# Patient Record
Sex: FEMALE | Race: WHITE | NOT HISPANIC OR LATINO | Employment: OTHER | URBAN - METROPOLITAN AREA
[De-identification: names, ages, dates, MRNs, and addresses within clinical notes are randomized per-mention and may not be internally consistent; named-entity substitution may affect disease eponyms.]

---

## 2017-01-12 ENCOUNTER — HOSPITAL ENCOUNTER (OUTPATIENT)
Dept: INFUSION THERAPY | Facility: HOSPITAL | Age: 67
Discharge: HOME | End: 2017-01-12
Admitting: ALLERGY & IMMUNOLOGY

## 2017-01-12 VITALS
TEMPERATURE: 98.1 F | SYSTOLIC BLOOD PRESSURE: 112 MMHG | OXYGEN SATURATION: 96 % | RESPIRATION RATE: 20 BRPM | HEART RATE: 69 BPM | DIASTOLIC BLOOD PRESSURE: 58 MMHG

## 2017-01-12 DIAGNOSIS — J45.40 MODERATE PERSISTENT ASTHMA WITHOUT COMPLICATION: ICD-10-CM

## 2017-01-12 PROCEDURE — 25010000002 OMALIZUMAB PER 5 MG: Performed by: ALLERGY & IMMUNOLOGY

## 2017-01-12 PROCEDURE — 96401 CHEMO ANTI-NEOPL SQ/IM: CPT

## 2017-01-12 RX ADMIN — OMALIZUMAB 150 MG: 202.5 INJECTION, SOLUTION SUBCUTANEOUS at 10:02

## 2017-01-12 NOTE — PROGRESS NOTES
Patient refused AVS. Patient tolerated injection with no S/S of reaction or complaints voiced. Injection site x 1 with band-aid applied at injection time and checked prior to discharge. Patient ambulatory, D/C'd from ACU at 1037.

## 2017-02-09 ENCOUNTER — HOSPITAL ENCOUNTER (OUTPATIENT)
Dept: INFUSION THERAPY | Facility: HOSPITAL | Age: 67
Discharge: HOME | End: 2017-02-09
Admitting: ALLERGY & IMMUNOLOGY

## 2017-02-09 VITALS
DIASTOLIC BLOOD PRESSURE: 73 MMHG | SYSTOLIC BLOOD PRESSURE: 119 MMHG | RESPIRATION RATE: 20 BRPM | HEART RATE: 75 BPM | OXYGEN SATURATION: 95 % | TEMPERATURE: 97.2 F

## 2017-02-09 DIAGNOSIS — J45.40 MODERATE PERSISTENT ASTHMA WITHOUT COMPLICATION: ICD-10-CM

## 2017-02-09 PROCEDURE — 25010000002 OMALIZUMAB PER 5 MG: Performed by: ALLERGY & IMMUNOLOGY

## 2017-02-09 PROCEDURE — 96401 CHEMO ANTI-NEOPL SQ/IM: CPT

## 2017-02-09 RX ADMIN — OMALIZUMAB 150 MG: 202.5 INJECTION, SOLUTION SUBCUTANEOUS at 10:29

## 2017-03-09 ENCOUNTER — APPOINTMENT (OUTPATIENT)
Dept: INFUSION THERAPY | Facility: HOSPITAL | Age: 67
End: 2017-03-09

## 2017-03-16 ENCOUNTER — HOSPITAL ENCOUNTER (OUTPATIENT)
Dept: INFUSION THERAPY | Facility: HOSPITAL | Age: 67
Discharge: HOME | End: 2017-03-16
Admitting: ALLERGY & IMMUNOLOGY

## 2017-03-16 VITALS
HEART RATE: 76 BPM | TEMPERATURE: 97.3 F | DIASTOLIC BLOOD PRESSURE: 86 MMHG | OXYGEN SATURATION: 95 % | SYSTOLIC BLOOD PRESSURE: 120 MMHG | RESPIRATION RATE: 20 BRPM

## 2017-03-16 DIAGNOSIS — J45.40 MODERATE PERSISTENT ASTHMA WITHOUT COMPLICATION: ICD-10-CM

## 2017-03-16 PROCEDURE — 96401 CHEMO ANTI-NEOPL SQ/IM: CPT

## 2017-03-16 PROCEDURE — 25010000002 OMALIZUMAB PER 5 MG: Performed by: ALLERGY & IMMUNOLOGY

## 2017-03-16 RX ORDER — GABAPENTIN 300 MG/1
CAPSULE ORAL
COMMUNITY
Start: 2017-02-16 | End: 2018-03-01 | Stop reason: ALTCHOICE

## 2017-03-16 RX ADMIN — OMALIZUMAB 150 MG: 202.5 INJECTION, SOLUTION SUBCUTANEOUS at 10:20

## 2017-04-13 ENCOUNTER — HOSPITAL ENCOUNTER (OUTPATIENT)
Dept: INFUSION THERAPY | Facility: HOSPITAL | Age: 67
Discharge: HOME | End: 2017-04-13
Admitting: ALLERGY & IMMUNOLOGY

## 2017-04-13 VITALS
DIASTOLIC BLOOD PRESSURE: 74 MMHG | TEMPERATURE: 97.4 F | SYSTOLIC BLOOD PRESSURE: 119 MMHG | RESPIRATION RATE: 16 BRPM | OXYGEN SATURATION: 94 % | HEART RATE: 69 BPM

## 2017-04-13 DIAGNOSIS — J45.40 MODERATE PERSISTENT ASTHMA WITHOUT COMPLICATION: ICD-10-CM

## 2017-04-13 PROCEDURE — 96401 CHEMO ANTI-NEOPL SQ/IM: CPT

## 2017-04-13 PROCEDURE — 25010000002 OMALIZUMAB PER 5 MG: Performed by: ALLERGY & IMMUNOLOGY

## 2017-04-13 RX ADMIN — OMALIZUMAB 150 MG: 202.5 INJECTION, SOLUTION SUBCUTANEOUS at 10:04

## 2017-04-13 NOTE — PROGRESS NOTES
Tolerated well. Monitored 30 minutes post injection without incident. VSS. D/C'd per ambulation @1040 with sister.

## 2017-05-11 ENCOUNTER — HOSPITAL ENCOUNTER (OUTPATIENT)
Dept: INFUSION THERAPY | Facility: HOSPITAL | Age: 67
Discharge: HOME | End: 2017-05-11
Admitting: FAMILY MEDICINE

## 2017-05-11 VITALS
SYSTOLIC BLOOD PRESSURE: 113 MMHG | HEART RATE: 80 BPM | TEMPERATURE: 97.4 F | DIASTOLIC BLOOD PRESSURE: 73 MMHG | RESPIRATION RATE: 20 BRPM | OXYGEN SATURATION: 96 %

## 2017-05-11 DIAGNOSIS — J45.40 MODERATE PERSISTENT ASTHMA WITHOUT COMPLICATION: ICD-10-CM

## 2017-05-11 PROCEDURE — 96401 CHEMO ANTI-NEOPL SQ/IM: CPT

## 2017-05-11 PROCEDURE — 25010000002 OMALIZUMAB PER 5 MG: Performed by: ALLERGY & IMMUNOLOGY

## 2017-05-11 RX ADMIN — OMALIZUMAB 150 MG: 202.5 INJECTION, SOLUTION SUBCUTANEOUS at 10:25

## 2017-06-08 ENCOUNTER — HOSPITAL ENCOUNTER (OUTPATIENT)
Dept: INFUSION THERAPY | Facility: HOSPITAL | Age: 67
Discharge: HOME | End: 2017-06-08
Admitting: ALLERGY & IMMUNOLOGY

## 2017-06-08 VITALS
OXYGEN SATURATION: 96 % | HEART RATE: 72 BPM | DIASTOLIC BLOOD PRESSURE: 79 MMHG | SYSTOLIC BLOOD PRESSURE: 129 MMHG | RESPIRATION RATE: 20 BRPM | TEMPERATURE: 97.6 F

## 2017-06-08 DIAGNOSIS — J45.40 MODERATE PERSISTENT ASTHMA WITHOUT COMPLICATION: ICD-10-CM

## 2017-06-08 PROCEDURE — 96401 CHEMO ANTI-NEOPL SQ/IM: CPT

## 2017-06-08 PROCEDURE — 25010000002 OMALIZUMAB PER 5 MG: Performed by: ALLERGY & IMMUNOLOGY

## 2017-06-08 RX ADMIN — OMALIZUMAB 150 MG: 202.5 INJECTION, SOLUTION SUBCUTANEOUS at 10:15

## 2017-07-06 ENCOUNTER — HOSPITAL ENCOUNTER (OUTPATIENT)
Dept: INFUSION THERAPY | Facility: HOSPITAL | Age: 67
Discharge: HOME | End: 2017-07-06
Admitting: ALLERGY & IMMUNOLOGY

## 2017-07-06 ENCOUNTER — TRANSCRIBE ORDERS (OUTPATIENT)
Dept: ADMINISTRATIVE | Facility: HOSPITAL | Age: 67
End: 2017-07-06

## 2017-07-06 VITALS
SYSTOLIC BLOOD PRESSURE: 128 MMHG | TEMPERATURE: 95.5 F | HEART RATE: 64 BPM | DIASTOLIC BLOOD PRESSURE: 73 MMHG | OXYGEN SATURATION: 94 % | RESPIRATION RATE: 16 BRPM

## 2017-07-06 DIAGNOSIS — R06.09 DYSPNEA ON EXERTION: Primary | ICD-10-CM

## 2017-07-06 DIAGNOSIS — J98.6 ELEVATED DIAPHRAGM: ICD-10-CM

## 2017-07-06 DIAGNOSIS — J45.40 MODERATE PERSISTENT ASTHMA WITHOUT COMPLICATION: ICD-10-CM

## 2017-07-06 DIAGNOSIS — J45.909 UNCOMPLICATED ASTHMA, UNSPECIFIED ASTHMA SEVERITY: ICD-10-CM

## 2017-07-06 PROCEDURE — 96401 CHEMO ANTI-NEOPL SQ/IM: CPT

## 2017-07-06 PROCEDURE — 25010000002 OMALIZUMAB PER 5 MG: Performed by: ALLERGY & IMMUNOLOGY

## 2017-07-06 RX ADMIN — OMALIZUMAB 150 MG: 202.5 INJECTION, SOLUTION SUBCUTANEOUS at 10:13

## 2017-07-06 NOTE — PROGRESS NOTES
Right arm injection site checked, no s/s of reaction. Patient discharged at 1048 per ambulation.

## 2017-07-27 ENCOUNTER — HOSPITAL ENCOUNTER (OUTPATIENT)
Dept: RESPIRATORY THERAPY | Facility: HOSPITAL | Age: 67
Discharge: HOME | End: 2017-07-27
Attending: INTERNAL MEDICINE | Admitting: INTERNAL MEDICINE

## 2017-07-27 ENCOUNTER — HOSPITAL ENCOUNTER (OUTPATIENT)
Dept: GENERAL RADIOLOGY | Facility: HOSPITAL | Age: 67
Discharge: HOME | End: 2017-07-27
Attending: INTERNAL MEDICINE

## 2017-07-27 DIAGNOSIS — J98.6 ELEVATED DIAPHRAGM: ICD-10-CM

## 2017-07-27 DIAGNOSIS — R06.09 DYSPNEA ON EXERTION: ICD-10-CM

## 2017-07-27 DIAGNOSIS — J45.909 UNCOMPLICATED ASTHMA, UNSPECIFIED ASTHMA SEVERITY: ICD-10-CM

## 2017-07-27 PROCEDURE — 94640 AIRWAY INHALATION TREATMENT: CPT

## 2017-07-27 PROCEDURE — A9270 NON-COVERED ITEM OR SERVICE: HCPCS | Performed by: INTERNAL MEDICINE

## 2017-07-27 PROCEDURE — 94729 DIFFUSING CAPACITY: CPT

## 2017-07-27 PROCEDURE — 94060 EVALUATION OF WHEEZING: CPT

## 2017-07-27 PROCEDURE — 94726 PLETHYSMOGRAPHY LUNG VOLUMES: CPT

## 2017-07-27 PROCEDURE — 63710000001 ALBUTEROL PER 1 MG: Performed by: INTERNAL MEDICINE

## 2017-07-27 PROCEDURE — 76000 FLUOROSCOPY <1 HR PHYS/QHP: CPT

## 2017-07-27 RX ORDER — ALBUTEROL SULFATE 2.5 MG/3ML
2.5 SOLUTION RESPIRATORY (INHALATION) ONCE
Status: COMPLETED | OUTPATIENT
Start: 2017-07-27 | End: 2017-07-27

## 2017-07-27 RX ADMIN — ALBUTEROL SULFATE 2.5 MG: 2.5 SOLUTION RESPIRATORY (INHALATION) at 10:02

## 2017-08-03 ENCOUNTER — HOSPITAL ENCOUNTER (OUTPATIENT)
Dept: INFUSION THERAPY | Facility: HOSPITAL | Age: 67
Discharge: HOME | End: 2017-08-03
Admitting: ALLERGY & IMMUNOLOGY

## 2017-08-03 VITALS
DIASTOLIC BLOOD PRESSURE: 64 MMHG | HEART RATE: 68 BPM | SYSTOLIC BLOOD PRESSURE: 108 MMHG | RESPIRATION RATE: 20 BRPM | TEMPERATURE: 94.6 F | OXYGEN SATURATION: 97 %

## 2017-08-03 DIAGNOSIS — J45.40 MODERATE PERSISTENT ASTHMA WITHOUT COMPLICATION: ICD-10-CM

## 2017-08-03 PROCEDURE — 25010000002 OMALIZUMAB PER 5 MG: Performed by: ALLERGY & IMMUNOLOGY

## 2017-08-03 PROCEDURE — 96372 THER/PROPH/DIAG INJ SC/IM: CPT

## 2017-08-03 RX ADMIN — OMALIZUMAB 150 MG: 202.5 INJECTION, SOLUTION SUBCUTANEOUS at 10:20

## 2017-08-31 ENCOUNTER — HOSPITAL ENCOUNTER (OUTPATIENT)
Dept: INFUSION THERAPY | Facility: HOSPITAL | Age: 67
Discharge: HOME | End: 2017-08-31
Admitting: ALLERGY & IMMUNOLOGY

## 2017-08-31 VITALS
SYSTOLIC BLOOD PRESSURE: 122 MMHG | HEART RATE: 68 BPM | DIASTOLIC BLOOD PRESSURE: 71 MMHG | RESPIRATION RATE: 20 BRPM | OXYGEN SATURATION: 96 % | TEMPERATURE: 97.6 F

## 2017-08-31 DIAGNOSIS — J45.40 MODERATE PERSISTENT ASTHMA WITHOUT COMPLICATION: ICD-10-CM

## 2017-08-31 PROCEDURE — 25010000002 OMALIZUMAB PER 5 MG: Performed by: ALLERGY & IMMUNOLOGY

## 2017-08-31 PROCEDURE — 96372 THER/PROPH/DIAG INJ SC/IM: CPT

## 2017-08-31 RX ADMIN — OMALIZUMAB 150 MG: 202.5 INJECTION, SOLUTION SUBCUTANEOUS at 10:08

## 2017-09-28 ENCOUNTER — HOSPITAL ENCOUNTER (OUTPATIENT)
Dept: INFUSION THERAPY | Facility: HOSPITAL | Age: 67
Discharge: HOME | End: 2017-09-28
Admitting: ALLERGY & IMMUNOLOGY

## 2017-09-28 VITALS
SYSTOLIC BLOOD PRESSURE: 114 MMHG | HEART RATE: 69 BPM | TEMPERATURE: 98.1 F | DIASTOLIC BLOOD PRESSURE: 60 MMHG | RESPIRATION RATE: 16 BRPM | OXYGEN SATURATION: 95 %

## 2017-09-28 DIAGNOSIS — J45.40 MODERATE PERSISTENT ASTHMA WITHOUT COMPLICATION: ICD-10-CM

## 2017-09-28 PROCEDURE — 96372 THER/PROPH/DIAG INJ SC/IM: CPT

## 2017-09-28 PROCEDURE — 25010000002 OMALIZUMAB PER 5 MG: Performed by: ALLERGY & IMMUNOLOGY

## 2017-09-28 RX ADMIN — OMALIZUMAB 150 MG: 202.5 INJECTION, SOLUTION SUBCUTANEOUS at 10:12

## 2017-10-26 ENCOUNTER — HOSPITAL ENCOUNTER (OUTPATIENT)
Dept: INFUSION THERAPY | Facility: HOSPITAL | Age: 67
Discharge: HOME | End: 2017-10-26
Admitting: ALLERGY & IMMUNOLOGY

## 2017-10-26 VITALS
SYSTOLIC BLOOD PRESSURE: 113 MMHG | HEART RATE: 73 BPM | TEMPERATURE: 96.4 F | OXYGEN SATURATION: 92 % | RESPIRATION RATE: 20 BRPM | DIASTOLIC BLOOD PRESSURE: 64 MMHG

## 2017-10-26 DIAGNOSIS — J45.40 MODERATE PERSISTENT ASTHMA WITHOUT COMPLICATION: Primary | ICD-10-CM

## 2017-10-26 PROCEDURE — 25010000002 OMALIZUMAB PER 5 MG: Performed by: ALLERGY & IMMUNOLOGY

## 2017-10-26 PROCEDURE — 96372 THER/PROPH/DIAG INJ SC/IM: CPT

## 2017-10-26 RX ADMIN — OMALIZUMAB 150 MG: 202.5 INJECTION, SOLUTION SUBCUTANEOUS at 10:31

## 2017-11-30 ENCOUNTER — APPOINTMENT (OUTPATIENT)
Dept: INFUSION THERAPY | Facility: HOSPITAL | Age: 67
End: 2017-11-30

## 2017-12-07 ENCOUNTER — HOSPITAL ENCOUNTER (OUTPATIENT)
Dept: INFUSION THERAPY | Facility: HOSPITAL | Age: 67
Discharge: HOME | End: 2017-12-07
Admitting: FAMILY MEDICINE

## 2017-12-07 VITALS
DIASTOLIC BLOOD PRESSURE: 75 MMHG | HEART RATE: 73 BPM | OXYGEN SATURATION: 94 % | RESPIRATION RATE: 16 BRPM | SYSTOLIC BLOOD PRESSURE: 119 MMHG | TEMPERATURE: 97.3 F

## 2017-12-07 DIAGNOSIS — J45.40 MODERATE PERSISTENT ASTHMA WITHOUT COMPLICATION: ICD-10-CM

## 2017-12-07 PROCEDURE — 25010000002 OMALIZUMAB PER 5 MG: Performed by: ALLERGY & IMMUNOLOGY

## 2017-12-07 PROCEDURE — 96372 THER/PROPH/DIAG INJ SC/IM: CPT

## 2017-12-07 RX ADMIN — OMALIZUMAB 150 MG: 202.5 INJECTION, SOLUTION SUBCUTANEOUS at 11:02

## 2017-12-07 NOTE — PROGRESS NOTES
Tolerated injection without difficulty. S/p observation vss. dc'd home amb after appointment completed.

## 2017-12-28 ENCOUNTER — APPOINTMENT (OUTPATIENT)
Dept: INFUSION THERAPY | Facility: HOSPITAL | Age: 67
End: 2017-12-28

## 2018-01-04 ENCOUNTER — HOSPITAL ENCOUNTER (OUTPATIENT)
Dept: INFUSION THERAPY | Facility: HOSPITAL | Age: 68
Discharge: HOME | End: 2018-01-04
Admitting: ALLERGY & IMMUNOLOGY

## 2018-01-04 VITALS
TEMPERATURE: 94.4 F | DIASTOLIC BLOOD PRESSURE: 66 MMHG | RESPIRATION RATE: 16 BRPM | HEART RATE: 76 BPM | OXYGEN SATURATION: 95 % | SYSTOLIC BLOOD PRESSURE: 131 MMHG

## 2018-01-04 DIAGNOSIS — J45.40 MODERATE PERSISTENT ASTHMA WITHOUT COMPLICATION: ICD-10-CM

## 2018-01-04 PROCEDURE — 96372 THER/PROPH/DIAG INJ SC/IM: CPT

## 2018-01-04 PROCEDURE — 25010000002 OMALIZUMAB PER 5 MG: Performed by: ALLERGY & IMMUNOLOGY

## 2018-01-04 RX ADMIN — OMALIZUMAB 150 MG: 202.5 INJECTION, SOLUTION SUBCUTANEOUS at 13:47

## 2018-01-04 NOTE — PROGRESS NOTES
Pt tolerated injection well.  Pt monitored for 30 min post injection.  VSS.  Pt given AVS and dc'ed ambulatory at 1423.

## 2018-02-01 ENCOUNTER — HOSPITAL ENCOUNTER (OUTPATIENT)
Dept: INFUSION THERAPY | Facility: HOSPITAL | Age: 68
Discharge: HOME | End: 2018-02-01
Admitting: ALLERGY & IMMUNOLOGY

## 2018-02-01 VITALS
RESPIRATION RATE: 16 BRPM | OXYGEN SATURATION: 95 % | TEMPERATURE: 98 F | HEART RATE: 75 BPM | DIASTOLIC BLOOD PRESSURE: 76 MMHG | SYSTOLIC BLOOD PRESSURE: 114 MMHG

## 2018-02-01 DIAGNOSIS — J45.40 MODERATE PERSISTENT ASTHMA, UNSPECIFIED WHETHER COMPLICATED: ICD-10-CM

## 2018-02-01 PROCEDURE — 96372 THER/PROPH/DIAG INJ SC/IM: CPT

## 2018-02-01 PROCEDURE — 25010000002 OMALIZUMAB PER 5 MG: Performed by: ALLERGY & IMMUNOLOGY

## 2018-02-01 RX ADMIN — OMALIZUMAB 150 MG: 202.5 INJECTION, SOLUTION SUBCUTANEOUS at 10:31

## 2018-03-01 ENCOUNTER — HOSPITAL ENCOUNTER (OUTPATIENT)
Dept: INFUSION THERAPY | Facility: HOSPITAL | Age: 68
Discharge: HOME | End: 2018-03-01
Admitting: ALLERGY & IMMUNOLOGY

## 2018-03-01 VITALS
DIASTOLIC BLOOD PRESSURE: 62 MMHG | TEMPERATURE: 95.5 F | RESPIRATION RATE: 16 BRPM | SYSTOLIC BLOOD PRESSURE: 132 MMHG | HEART RATE: 66 BPM | OXYGEN SATURATION: 92 %

## 2018-03-01 DIAGNOSIS — J45.40 MODERATE PERSISTENT ASTHMA, UNSPECIFIED WHETHER COMPLICATED: ICD-10-CM

## 2018-03-01 PROCEDURE — 25010000002 OMALIZUMAB PER 5 MG: Performed by: ALLERGY & IMMUNOLOGY

## 2018-03-01 PROCEDURE — 96372 THER/PROPH/DIAG INJ SC/IM: CPT

## 2018-03-01 RX ADMIN — OMALIZUMAB 150 MG: 202.5 INJECTION, SOLUTION SUBCUTANEOUS at 10:04

## 2018-03-01 NOTE — PROGRESS NOTES
Pt tolerated injection well.  Pt monitored for 30 minutes and VSS.  Pt given AVS and dc'ed ambulatory at 1038.

## 2018-03-29 ENCOUNTER — HOSPITAL ENCOUNTER (OUTPATIENT)
Dept: INFUSION THERAPY | Facility: HOSPITAL | Age: 68
Discharge: HOME | End: 2018-03-29
Admitting: ALLERGY & IMMUNOLOGY

## 2018-03-29 VITALS
RESPIRATION RATE: 16 BRPM | HEART RATE: 68 BPM | DIASTOLIC BLOOD PRESSURE: 72 MMHG | OXYGEN SATURATION: 93 % | SYSTOLIC BLOOD PRESSURE: 132 MMHG | TEMPERATURE: 95.2 F

## 2018-03-29 DIAGNOSIS — J45.40 MODERATE PERSISTENT ASTHMA, UNSPECIFIED WHETHER COMPLICATED: ICD-10-CM

## 2018-03-29 PROCEDURE — 25010000002 OMALIZUMAB PER 5 MG: Performed by: ALLERGY & IMMUNOLOGY

## 2018-03-29 PROCEDURE — 96372 THER/PROPH/DIAG INJ SC/IM: CPT

## 2018-03-29 RX ADMIN — OMALIZUMAB 150 MG: 202.5 INJECTION, SOLUTION SUBCUTANEOUS at 10:46

## 2018-04-26 ENCOUNTER — HOSPITAL ENCOUNTER (OUTPATIENT)
Dept: INFUSION THERAPY | Facility: HOSPITAL | Age: 68
Discharge: HOME | End: 2018-04-26
Admitting: ALLERGY & IMMUNOLOGY

## 2018-04-26 VITALS
HEART RATE: 65 BPM | DIASTOLIC BLOOD PRESSURE: 69 MMHG | SYSTOLIC BLOOD PRESSURE: 120 MMHG | RESPIRATION RATE: 16 BRPM | TEMPERATURE: 97.5 F | OXYGEN SATURATION: 95 %

## 2018-04-26 DIAGNOSIS — J45.40 MODERATE PERSISTENT ASTHMA, UNSPECIFIED WHETHER COMPLICATED: ICD-10-CM

## 2018-04-26 PROCEDURE — 25010000002 OMALIZUMAB PER 5 MG: Performed by: ALLERGY & IMMUNOLOGY

## 2018-04-26 PROCEDURE — 96372 THER/PROPH/DIAG INJ SC/IM: CPT

## 2018-04-26 RX ADMIN — OMALIZUMAB 150 MG: 202.5 INJECTION, SOLUTION SUBCUTANEOUS at 10:50

## 2018-04-26 NOTE — PROGRESS NOTES
Pt tolerated injection well.  VSS.  Pt monitored for 30 minutes post injection.  Injection site x 1 with band aide applied.  Pt DC per ambulation with family @ 3215.

## 2018-05-24 ENCOUNTER — HOSPITAL ENCOUNTER (OUTPATIENT)
Dept: INFUSION THERAPY | Facility: HOSPITAL | Age: 68
Discharge: HOME | End: 2018-05-24
Admitting: ALLERGY & IMMUNOLOGY

## 2018-05-24 VITALS
OXYGEN SATURATION: 94 % | SYSTOLIC BLOOD PRESSURE: 142 MMHG | TEMPERATURE: 95.1 F | DIASTOLIC BLOOD PRESSURE: 79 MMHG | RESPIRATION RATE: 16 BRPM | HEART RATE: 67 BPM

## 2018-05-24 DIAGNOSIS — J45.40 MODERATE PERSISTENT ASTHMA, UNSPECIFIED WHETHER COMPLICATED: ICD-10-CM

## 2018-05-24 PROCEDURE — 96365 THER/PROPH/DIAG IV INF INIT: CPT

## 2018-05-24 PROCEDURE — 96372 THER/PROPH/DIAG INJ SC/IM: CPT

## 2018-05-24 PROCEDURE — 25010000002 OMALIZUMAB PER 5 MG: Performed by: ALLERGY & IMMUNOLOGY

## 2018-05-24 RX ADMIN — OMALIZUMAB 150 MG: 202.5 INJECTION, SOLUTION SUBCUTANEOUS at 09:51

## 2018-06-21 ENCOUNTER — HOSPITAL ENCOUNTER (OUTPATIENT)
Dept: INFUSION THERAPY | Facility: HOSPITAL | Age: 68
Discharge: HOME | End: 2018-06-21
Admitting: ALLERGY & IMMUNOLOGY

## 2018-06-21 VITALS
TEMPERATURE: 95.3 F | HEART RATE: 69 BPM | RESPIRATION RATE: 20 BRPM | DIASTOLIC BLOOD PRESSURE: 70 MMHG | SYSTOLIC BLOOD PRESSURE: 126 MMHG | OXYGEN SATURATION: 92 %

## 2018-06-21 DIAGNOSIS — J45.40 MODERATE PERSISTENT ASTHMA, UNSPECIFIED WHETHER COMPLICATED: ICD-10-CM

## 2018-06-21 PROCEDURE — 25010000002 OMALIZUMAB PER 5 MG: Performed by: ALLERGY & IMMUNOLOGY

## 2018-06-21 PROCEDURE — 96372 THER/PROPH/DIAG INJ SC/IM: CPT

## 2018-06-21 RX ADMIN — OMALIZUMAB 150 MG: 202.5 INJECTION, SOLUTION SUBCUTANEOUS at 10:02

## 2018-06-21 NOTE — PROGRESS NOTES
Patient tolerated injection with no concerns. VSS. Patient monitored for 30 minutes post injection.  Injection site x 1 with band-aid applied. Patient ambulatory, D/C'd from ACU with sister at 1038.

## 2018-07-19 ENCOUNTER — HOSPITAL ENCOUNTER (OUTPATIENT)
Dept: INFUSION THERAPY | Facility: HOSPITAL | Age: 68
Discharge: HOME | End: 2018-07-19

## 2018-07-24 ENCOUNTER — HOSPITAL ENCOUNTER (OUTPATIENT)
Dept: INFUSION THERAPY | Facility: HOSPITAL | Age: 68
Discharge: HOME | End: 2018-07-24
Admitting: ALLERGY & IMMUNOLOGY

## 2018-07-24 VITALS
OXYGEN SATURATION: 94 % | DIASTOLIC BLOOD PRESSURE: 80 MMHG | TEMPERATURE: 96.1 F | RESPIRATION RATE: 16 BRPM | SYSTOLIC BLOOD PRESSURE: 164 MMHG | HEART RATE: 85 BPM

## 2018-07-24 DIAGNOSIS — J45.40 MODERATE PERSISTENT ASTHMA, UNSPECIFIED WHETHER COMPLICATED: ICD-10-CM

## 2018-07-24 PROCEDURE — 25010000002 OMALIZUMAB PER 5 MG: Performed by: ALLERGY & IMMUNOLOGY

## 2018-07-24 PROCEDURE — 96372 THER/PROPH/DIAG INJ SC/IM: CPT

## 2018-07-24 RX ADMIN — OMALIZUMAB 150 MG: 202.5 INJECTION, SOLUTION SUBCUTANEOUS at 10:20

## 2018-07-24 NOTE — PROGRESS NOTES
Monitored patient for 30 min post injection. No concerns noted. Discharged home ambulatory at 1055.

## 2018-08-16 ENCOUNTER — APPOINTMENT (OUTPATIENT)
Dept: INFUSION THERAPY | Facility: HOSPITAL | Age: 68
End: 2018-08-16

## 2018-08-23 ENCOUNTER — HOSPITAL ENCOUNTER (OUTPATIENT)
Dept: INFUSION THERAPY | Facility: HOSPITAL | Age: 68
Discharge: HOME | End: 2018-08-23
Admitting: ALLERGY & IMMUNOLOGY

## 2018-08-23 VITALS
RESPIRATION RATE: 20 BRPM | TEMPERATURE: 94.9 F | OXYGEN SATURATION: 93 % | SYSTOLIC BLOOD PRESSURE: 113 MMHG | DIASTOLIC BLOOD PRESSURE: 71 MMHG | HEART RATE: 64 BPM

## 2018-08-23 DIAGNOSIS — J45.40 MODERATE PERSISTENT ASTHMA, UNSPECIFIED WHETHER COMPLICATED: ICD-10-CM

## 2018-08-23 PROCEDURE — 96372 THER/PROPH/DIAG INJ SC/IM: CPT

## 2018-08-23 PROCEDURE — 25010000002 OMALIZUMAB PER 5 MG: Performed by: ALLERGY & IMMUNOLOGY

## 2018-08-23 RX ADMIN — OMALIZUMAB 150 MG: 202.5 INJECTION, SOLUTION SUBCUTANEOUS at 09:46

## 2018-08-23 NOTE — PROGRESS NOTES
Monitored patient x 30 minutes post injection. No concerns noted. Patient ambulatory, D/C'd from ACU at 1027.

## 2018-09-20 ENCOUNTER — HOSPITAL ENCOUNTER (OUTPATIENT)
Dept: INFUSION THERAPY | Facility: HOSPITAL | Age: 68
Discharge: HOME | End: 2018-09-20
Admitting: ALLERGY & IMMUNOLOGY

## 2018-09-20 VITALS
SYSTOLIC BLOOD PRESSURE: 131 MMHG | HEART RATE: 63 BPM | DIASTOLIC BLOOD PRESSURE: 81 MMHG | RESPIRATION RATE: 16 BRPM | TEMPERATURE: 97.5 F | OXYGEN SATURATION: 95 %

## 2018-09-20 DIAGNOSIS — J45.40 MODERATE PERSISTENT ASTHMA, UNSPECIFIED WHETHER COMPLICATED: ICD-10-CM

## 2018-09-20 PROCEDURE — 96372 THER/PROPH/DIAG INJ SC/IM: CPT

## 2018-09-20 PROCEDURE — 25010000002 OMALIZUMAB PER 5 MG: Performed by: ALLERGY & IMMUNOLOGY

## 2018-09-20 RX ADMIN — OMALIZUMAB 150 MG: 202.5 INJECTION, SOLUTION SUBCUTANEOUS at 10:00

## 2018-10-18 ENCOUNTER — HOSPITAL ENCOUNTER (OUTPATIENT)
Dept: INFUSION THERAPY | Facility: HOSPITAL | Age: 68
Discharge: HOME | End: 2018-10-18
Admitting: ALLERGY & IMMUNOLOGY

## 2018-10-18 VITALS
TEMPERATURE: 97.5 F | SYSTOLIC BLOOD PRESSURE: 112 MMHG | RESPIRATION RATE: 18 BRPM | OXYGEN SATURATION: 94 % | DIASTOLIC BLOOD PRESSURE: 56 MMHG | HEART RATE: 67 BPM

## 2018-10-18 DIAGNOSIS — J45.40 MODERATE PERSISTENT ASTHMA, UNSPECIFIED WHETHER COMPLICATED: ICD-10-CM

## 2018-10-18 PROCEDURE — 96372 THER/PROPH/DIAG INJ SC/IM: CPT

## 2018-10-18 PROCEDURE — 25010000002 OMALIZUMAB PER 5 MG: Performed by: ALLERGY & IMMUNOLOGY

## 2018-10-18 RX ADMIN — OMALIZUMAB 150 MG: 202.5 INJECTION, SOLUTION SUBCUTANEOUS at 09:58

## 2018-10-18 NOTE — PROGRESS NOTES
Monitored patient x 30 minutes post injection. No concerns noted. Patient ambulatory, D/C'd from ACU at 1031.

## 2018-11-15 ENCOUNTER — HOSPITAL ENCOUNTER (OUTPATIENT)
Dept: INFUSION THERAPY | Facility: HOSPITAL | Age: 68
Discharge: HOME | End: 2018-11-15
Admitting: ALLERGY & IMMUNOLOGY

## 2018-11-15 VITALS
DIASTOLIC BLOOD PRESSURE: 70 MMHG | HEART RATE: 81 BPM | RESPIRATION RATE: 20 BRPM | OXYGEN SATURATION: 92 % | SYSTOLIC BLOOD PRESSURE: 121 MMHG | TEMPERATURE: 96.5 F

## 2018-11-15 DIAGNOSIS — J45.40 MODERATE PERSISTENT ASTHMA, UNSPECIFIED WHETHER COMPLICATED: Primary | ICD-10-CM

## 2018-11-15 PROCEDURE — 96372 THER/PROPH/DIAG INJ SC/IM: CPT

## 2018-11-15 PROCEDURE — 25010000002 OMALIZUMAB PER 5 MG: Performed by: ALLERGY & IMMUNOLOGY

## 2018-11-15 RX ADMIN — OMALIZUMAB 150 MG: 202.5 INJECTION, SOLUTION SUBCUTANEOUS at 10:13

## 2018-11-15 NOTE — PROGRESS NOTES
Patient monitored x 30 minutes post injection. No S/S reaction noted or concerns voiced. Patient ambulatory, D/C'd from ACU at 1053.

## 2018-12-13 ENCOUNTER — HOSPITAL ENCOUNTER (OUTPATIENT)
Dept: INFUSION THERAPY | Facility: HOSPITAL | Age: 68
Discharge: HOME | End: 2018-12-13
Admitting: ALLERGY & IMMUNOLOGY

## 2018-12-13 VITALS
OXYGEN SATURATION: 97 % | HEART RATE: 61 BPM | SYSTOLIC BLOOD PRESSURE: 128 MMHG | WEIGHT: 242 LBS | DIASTOLIC BLOOD PRESSURE: 75 MMHG | TEMPERATURE: 97.6 F | BODY MASS INDEX: 40.27 KG/M2 | RESPIRATION RATE: 20 BRPM

## 2018-12-13 DIAGNOSIS — J45.40 MODERATE PERSISTENT ASTHMA WITHOUT COMPLICATION: Primary | ICD-10-CM

## 2018-12-13 DIAGNOSIS — J45.40 MODERATE PERSISTENT ASTHMA, UNSPECIFIED WHETHER COMPLICATED: ICD-10-CM

## 2018-12-13 PROCEDURE — 96372 THER/PROPH/DIAG INJ SC/IM: CPT

## 2018-12-13 PROCEDURE — 25010000002 OMALIZUMAB PER 5 MG: Performed by: ALLERGY & IMMUNOLOGY

## 2018-12-13 RX ADMIN — OMALIZUMAB 150 MG: 202.5 INJECTION, SOLUTION SUBCUTANEOUS at 10:00

## 2018-12-13 NOTE — PROGRESS NOTES
Tolerated injection well. VSS s/p observation time of 30 minutes. Discharged home amb with sister after appointment completed.

## 2019-01-10 ENCOUNTER — HOSPITAL ENCOUNTER (OUTPATIENT)
Dept: INFUSION THERAPY | Facility: HOSPITAL | Age: 69
Discharge: HOME | End: 2019-01-10
Admitting: ALLERGY & IMMUNOLOGY

## 2019-01-10 VITALS
DIASTOLIC BLOOD PRESSURE: 74 MMHG | TEMPERATURE: 97.9 F | SYSTOLIC BLOOD PRESSURE: 118 MMHG | HEART RATE: 64 BPM | OXYGEN SATURATION: 94 % | RESPIRATION RATE: 20 BRPM

## 2019-01-10 DIAGNOSIS — J45.40 MODERATE PERSISTENT ASTHMA, UNSPECIFIED WHETHER COMPLICATED: Primary | ICD-10-CM

## 2019-01-10 PROCEDURE — 25010000002 OMALIZUMAB PER 5 MG: Performed by: ALLERGY & IMMUNOLOGY

## 2019-01-10 PROCEDURE — 96372 THER/PROPH/DIAG INJ SC/IM: CPT

## 2019-01-10 RX ADMIN — OMALIZUMAB 150 MG: 202.5 INJECTION, SOLUTION SUBCUTANEOUS at 10:24

## 2019-02-07 ENCOUNTER — HOSPITAL ENCOUNTER (OUTPATIENT)
Dept: INFUSION THERAPY | Facility: HOSPITAL | Age: 69
Discharge: HOME | End: 2019-02-07
Admitting: ALLERGY & IMMUNOLOGY

## 2019-02-07 VITALS
HEART RATE: 67 BPM | DIASTOLIC BLOOD PRESSURE: 73 MMHG | OXYGEN SATURATION: 94 % | RESPIRATION RATE: 20 BRPM | TEMPERATURE: 97.4 F | SYSTOLIC BLOOD PRESSURE: 122 MMHG

## 2019-02-07 DIAGNOSIS — J45.40 MODERATE PERSISTENT ASTHMA, UNSPECIFIED WHETHER COMPLICATED: Primary | ICD-10-CM

## 2019-02-07 PROCEDURE — 25010000002 OMALIZUMAB PER 5 MG: Performed by: ALLERGY & IMMUNOLOGY

## 2019-02-07 PROCEDURE — 96372 THER/PROPH/DIAG INJ SC/IM: CPT

## 2019-02-07 RX ORDER — ASPIRIN 81 MG/1
81 TABLET ORAL DAILY
Status: ON HOLD | COMMUNITY
End: 2019-11-01

## 2019-02-07 RX ADMIN — OMALIZUMAB 150 MG: 202.5 INJECTION, SOLUTION SUBCUTANEOUS at 10:22

## 2019-02-07 NOTE — PROGRESS NOTES
Held patient for a 30 min recovery after injection. Tolerated well discharged home ambulatory at 1100

## 2019-03-07 ENCOUNTER — HOSPITAL ENCOUNTER (OUTPATIENT)
Dept: INFUSION THERAPY | Facility: HOSPITAL | Age: 69
Discharge: HOME | End: 2019-03-07
Admitting: ALLERGY & IMMUNOLOGY

## 2019-03-07 VITALS
SYSTOLIC BLOOD PRESSURE: 115 MMHG | HEART RATE: 67 BPM | RESPIRATION RATE: 20 BRPM | TEMPERATURE: 96.5 F | OXYGEN SATURATION: 93 % | DIASTOLIC BLOOD PRESSURE: 65 MMHG

## 2019-03-07 DIAGNOSIS — J45.40 MODERATE PERSISTENT ASTHMA, UNSPECIFIED WHETHER COMPLICATED: Primary | ICD-10-CM

## 2019-03-07 PROCEDURE — 25010000002 OMALIZUMAB PER 5 MG: Performed by: ALLERGY & IMMUNOLOGY

## 2019-03-07 PROCEDURE — 96372 THER/PROPH/DIAG INJ SC/IM: CPT

## 2019-03-07 RX ADMIN — OMALIZUMAB 150 MG: 202.5 INJECTION, SOLUTION SUBCUTANEOUS at 09:48

## 2019-03-07 NOTE — PROGRESS NOTES
Held patient for a 30 min recovery after injection. Tolerated well discharged home ambulatory at 1023.

## 2019-04-04 ENCOUNTER — HOSPITAL ENCOUNTER (OUTPATIENT)
Dept: INFUSION THERAPY | Facility: HOSPITAL | Age: 69
Discharge: HOME | End: 2019-04-04
Admitting: ALLERGY & IMMUNOLOGY

## 2019-04-04 VITALS
OXYGEN SATURATION: 94 % | SYSTOLIC BLOOD PRESSURE: 92 MMHG | HEART RATE: 70 BPM | RESPIRATION RATE: 16 BRPM | DIASTOLIC BLOOD PRESSURE: 61 MMHG | TEMPERATURE: 95.3 F

## 2019-04-04 DIAGNOSIS — J45.40 MODERATE PERSISTENT ASTHMA, UNSPECIFIED WHETHER COMPLICATED: Primary | ICD-10-CM

## 2019-04-04 PROCEDURE — 25010000002 OMALIZUMAB PER 5 MG: Performed by: ALLERGY & IMMUNOLOGY

## 2019-04-04 PROCEDURE — 96372 THER/PROPH/DIAG INJ SC/IM: CPT

## 2019-04-04 RX ADMIN — OMALIZUMAB 150 MG: 202.5 INJECTION, SOLUTION SUBCUTANEOUS at 09:50

## 2019-04-04 NOTE — PROGRESS NOTES
Tolerated injection well.  Vital signs stable.  Injection site x 1 with band aide applied.  Vital signs monitored for 30 minutes per MD order.  Pt DC per ambulation with sister @ 1030.

## 2019-05-02 ENCOUNTER — HOSPITAL ENCOUNTER (OUTPATIENT)
Dept: INFUSION THERAPY | Facility: HOSPITAL | Age: 69
Discharge: HOME | End: 2019-05-02
Admitting: ALLERGY & IMMUNOLOGY

## 2019-05-02 VITALS
TEMPERATURE: 97.6 F | OXYGEN SATURATION: 93 % | SYSTOLIC BLOOD PRESSURE: 111 MMHG | DIASTOLIC BLOOD PRESSURE: 72 MMHG | RESPIRATION RATE: 20 BRPM | HEART RATE: 74 BPM

## 2019-05-02 DIAGNOSIS — J45.40 MODERATE PERSISTENT ASTHMA, UNSPECIFIED WHETHER COMPLICATED: Primary | ICD-10-CM

## 2019-05-02 PROCEDURE — 96372 THER/PROPH/DIAG INJ SC/IM: CPT

## 2019-05-02 PROCEDURE — 25010000002 OMALIZUMAB PER 5 MG: Performed by: ALLERGY & IMMUNOLOGY

## 2019-05-02 RX ADMIN — OMALIZUMAB 150 MG: 202.5 INJECTION, SOLUTION SUBCUTANEOUS at 10:32

## 2019-05-02 NOTE — PROGRESS NOTES
Patient tolerated injection well. VSS. Monitored x 30 minutes post injection. Patient ambulatory, D/C'd from ACU at 1106.

## 2019-05-30 ENCOUNTER — HOSPITAL ENCOUNTER (OUTPATIENT)
Dept: INFUSION THERAPY | Facility: HOSPITAL | Age: 69
Discharge: HOME | End: 2019-05-30
Admitting: ALLERGY & IMMUNOLOGY

## 2019-05-30 VITALS
HEART RATE: 62 BPM | DIASTOLIC BLOOD PRESSURE: 73 MMHG | SYSTOLIC BLOOD PRESSURE: 129 MMHG | OXYGEN SATURATION: 95 % | RESPIRATION RATE: 16 BRPM | TEMPERATURE: 97.3 F

## 2019-05-30 DIAGNOSIS — J45.40 MODERATE PERSISTENT ASTHMA, UNSPECIFIED WHETHER COMPLICATED: Primary | ICD-10-CM

## 2019-05-30 PROCEDURE — 25010000002 OMALIZUMAB PER 5 MG: Performed by: ALLERGY & IMMUNOLOGY

## 2019-05-30 PROCEDURE — 96372 THER/PROPH/DIAG INJ SC/IM: CPT

## 2019-05-30 RX ADMIN — OMALIZUMAB 150 MG: 202.5 INJECTION, SOLUTION SUBCUTANEOUS at 10:33

## 2019-06-20 ENCOUNTER — HOSPITAL ENCOUNTER (OUTPATIENT)
Dept: INFUSION THERAPY | Facility: HOSPITAL | Age: 69
Discharge: HOME | End: 2019-06-20
Admitting: ALLERGY & IMMUNOLOGY

## 2019-06-20 VITALS
DIASTOLIC BLOOD PRESSURE: 80 MMHG | TEMPERATURE: 97.6 F | SYSTOLIC BLOOD PRESSURE: 124 MMHG | HEART RATE: 66 BPM | OXYGEN SATURATION: 95 % | RESPIRATION RATE: 20 BRPM

## 2019-06-20 DIAGNOSIS — J45.40 MODERATE PERSISTENT ASTHMA, UNSPECIFIED WHETHER COMPLICATED: Primary | ICD-10-CM

## 2019-06-20 PROCEDURE — 25010000002 OMALIZUMAB PER 5 MG: Performed by: ALLERGY & IMMUNOLOGY

## 2019-06-20 PROCEDURE — 96372 THER/PROPH/DIAG INJ SC/IM: CPT

## 2019-06-20 RX ADMIN — OMALIZUMAB 150 MG: 202.5 INJECTION, SOLUTION SUBCUTANEOUS at 09:50

## 2019-06-20 NOTE — PROGRESS NOTES
Tolerated injection well.  Vital signs stable.  Monitored 30 minutes post injection per MD order without incident.  Pt DC with sister @ 9240.

## 2019-07-18 ENCOUNTER — HOSPITAL ENCOUNTER (OUTPATIENT)
Dept: INFUSION THERAPY | Facility: HOSPITAL | Age: 69
Discharge: HOME | End: 2019-07-18
Admitting: ALLERGY & IMMUNOLOGY

## 2019-07-18 VITALS
OXYGEN SATURATION: 95 % | RESPIRATION RATE: 20 BRPM | HEART RATE: 65 BPM | DIASTOLIC BLOOD PRESSURE: 81 MMHG | SYSTOLIC BLOOD PRESSURE: 123 MMHG | TEMPERATURE: 97.5 F

## 2019-07-18 DIAGNOSIS — J45.40 MODERATE PERSISTENT ASTHMA, UNSPECIFIED WHETHER COMPLICATED: Primary | ICD-10-CM

## 2019-07-18 PROCEDURE — 25010000002 OMALIZUMAB PER 5 MG: Performed by: ALLERGY & IMMUNOLOGY

## 2019-07-18 PROCEDURE — 96372 THER/PROPH/DIAG INJ SC/IM: CPT

## 2019-07-18 RX ADMIN — OMALIZUMAB 150 MG: 202.5 INJECTION, SOLUTION SUBCUTANEOUS at 10:09

## 2019-07-18 NOTE — PROGRESS NOTES
Tolerated injection well and VSS post injection q 15 minutes for 30 minute observation-discharged home amb with sister

## 2019-08-12 ENCOUNTER — CLINICAL SUPPORT (OUTPATIENT)
Dept: FAMILY MEDICINE CLINIC | Facility: CLINIC | Age: 69
End: 2019-08-12

## 2019-08-12 DIAGNOSIS — E53.8 B12 DEFICIENCY: Primary | ICD-10-CM

## 2019-08-12 DIAGNOSIS — E51.9 THIAMINE DEFICIENCY: ICD-10-CM

## 2019-08-12 PROCEDURE — 96372 THER/PROPH/DIAG INJ SC/IM: CPT | Performed by: FAMILY MEDICINE

## 2019-08-12 RX ORDER — CYANOCOBALAMIN 1000 UG/ML
1000 INJECTION, SOLUTION INTRAMUSCULAR; SUBCUTANEOUS
Status: DISCONTINUED | OUTPATIENT
Start: 2019-08-12 | End: 2019-10-29

## 2019-08-12 RX ORDER — THIAMINE HYDROCHLORIDE 100 MG/ML
200 INJECTION, SOLUTION INTRAMUSCULAR; INTRAVENOUS DAILY
Status: DISCONTINUED | OUTPATIENT
Start: 2019-08-12 | End: 2021-04-08 | Stop reason: DRUGHIGH

## 2019-08-12 RX ADMIN — CYANOCOBALAMIN 1000 MCG: 1000 INJECTION, SOLUTION INTRAMUSCULAR; SUBCUTANEOUS at 10:48

## 2019-08-12 RX ADMIN — THIAMINE HYDROCHLORIDE 200 MG: 100 INJECTION, SOLUTION INTRAMUSCULAR; INTRAVENOUS at 10:48

## 2019-08-15 ENCOUNTER — HOSPITAL ENCOUNTER (OUTPATIENT)
Dept: INFUSION THERAPY | Facility: HOSPITAL | Age: 69
Discharge: HOME | End: 2019-08-15
Admitting: ALLERGY & IMMUNOLOGY

## 2019-08-15 VITALS
RESPIRATION RATE: 20 BRPM | DIASTOLIC BLOOD PRESSURE: 73 MMHG | TEMPERATURE: 97.8 F | OXYGEN SATURATION: 96 % | HEART RATE: 65 BPM | SYSTOLIC BLOOD PRESSURE: 148 MMHG

## 2019-08-15 DIAGNOSIS — J45.40 MODERATE PERSISTENT ASTHMA, UNSPECIFIED WHETHER COMPLICATED: Primary | ICD-10-CM

## 2019-08-15 PROCEDURE — 25010000002 OMALIZUMAB PER 5 MG: Performed by: ALLERGY & IMMUNOLOGY

## 2019-08-15 PROCEDURE — 96372 THER/PROPH/DIAG INJ SC/IM: CPT

## 2019-08-15 RX ADMIN — OMALIZUMAB 150 MG: 202.5 INJECTION, SOLUTION SUBCUTANEOUS at 10:02

## 2019-08-15 NOTE — PROGRESS NOTES
Tolerated injection well.  Vital signs stable.  Monitored 30 minutes post injection per MD order without incident.  Pt DC with sister @ 9207.

## 2019-08-23 RX ORDER — AZELASTINE HYDROCHLORIDE 0.5 MG/ML
1 SOLUTION/ DROPS OPHTHALMIC 2 TIMES DAILY
Qty: 6 EACH | Refills: 2 | Status: SHIPPED | OUTPATIENT
Start: 2019-08-23 | End: 2019-12-26

## 2019-08-23 RX ORDER — THIAMINE HYDROCHLORIDE 100 MG/ML
200 INJECTION, SOLUTION INTRAMUSCULAR; INTRAVENOUS
Qty: 2 ML | Refills: 2 | Status: SHIPPED | OUTPATIENT
Start: 2019-08-23 | End: 2020-08-17 | Stop reason: SDUPTHER

## 2019-08-29 ENCOUNTER — OFFICE VISIT (OUTPATIENT)
Dept: SLEEP MEDICINE | Facility: HOSPITAL | Age: 69
End: 2019-08-29

## 2019-08-29 VITALS
DIASTOLIC BLOOD PRESSURE: 61 MMHG | WEIGHT: 245.6 LBS | OXYGEN SATURATION: 92 % | HEIGHT: 65 IN | SYSTOLIC BLOOD PRESSURE: 109 MMHG | HEART RATE: 69 BPM | BODY MASS INDEX: 40.92 KG/M2

## 2019-08-29 DIAGNOSIS — J45.40 MODERATE PERSISTENT ASTHMA, UNSPECIFIED WHETHER COMPLICATED: ICD-10-CM

## 2019-08-29 DIAGNOSIS — G47.10 HYPERSOMNIA: ICD-10-CM

## 2019-08-29 DIAGNOSIS — G47.8 NON-RESTORATIVE SLEEP: ICD-10-CM

## 2019-08-29 DIAGNOSIS — G47.33 OSA ON CPAP: Primary | ICD-10-CM

## 2019-08-29 DIAGNOSIS — Z99.89 OSA ON CPAP: Primary | ICD-10-CM

## 2019-08-29 DIAGNOSIS — R53.82 CHRONIC FATIGUE: ICD-10-CM

## 2019-08-29 PROBLEM — F51.04 PSYCHOPHYSIOLOGICAL INSOMNIA: Status: ACTIVE | Noted: 2019-08-29

## 2019-08-29 PROCEDURE — G0463 HOSPITAL OUTPT CLINIC VISIT: HCPCS

## 2019-08-29 PROCEDURE — 99204 OFFICE O/P NEW MOD 45 MIN: CPT | Performed by: INTERNAL MEDICINE

## 2019-08-29 RX ORDER — ZOLPIDEM TARTRATE 5 MG/1
5 TABLET ORAL TAKE AS DIRECTED
Qty: 2 TABLET | Refills: 0 | Status: SHIPPED | OUTPATIENT
Start: 2019-08-29 | End: 2019-09-06 | Stop reason: SDUPTHER

## 2019-08-29 RX ORDER — ZOLPIDEM TARTRATE 5 MG/1
5 TABLET ORAL TAKE AS DIRECTED
Qty: 2 TABLET | Refills: 0 | OUTPATIENT
Start: 2019-08-29 | End: 2019-08-29 | Stop reason: SDUPTHER

## 2019-08-29 NOTE — PROGRESS NOTES
[facility]  [address]  [address] [place] [address]  Phone   Fax       Octavia ROONEY Keerthi  1950  69 y.o.  female      PCP:Kehrer, Meredith Lea, MD  Referring Provider James Smith MD    Type of service: Initial consult  Date of service: 8/29/2019      Chief Complaint   Patient presents with   • Fatigue   • Witnessed Apnea   • Morning Headaches   • Dry Mouth   • Leg/body Jerks During Sleep   • Bothered by pain while sleeping   • Teeth grinding at night   • Unable To Fall Asleep   • Unable To Stay Asleep   • Frequent Awakenings   • Nocturia     2xs   • Non-restorative Sleep       History of present illness;  Thank you for asking to see Octavia TORIBIO Pendleton, 69 y.o.  for evaluation of sleep apnea. The patient was seen today on 8/29/2019 at [facility].  Patient has a history of sleep apnea and is been on CPAP for a number of years.  She thinks that she had a sleep test about 8 years ago initially the CPAP helped her and now the CPAP is not benefiting the patient.  Reports that she wakes up tired and exhausted and still feels that she wants to sleep more.  She also has other medical problems which includes asthma who is on Xolair along with acid reflux.  She had a TIA in the past.  She is on multiple medications.  She also has several waking ups and a night and sometimes has difficulty falling asleep.    Patient gives the following sleep history.  Sleep schedule:  Bedtime: 11 PM  Wake time: 8 AM  Normally takes about 30-60 minutes to fall asleep  Average hours of sleep 8  Number of naps per day no  When patient wakes up still feels tired and not rested  Snoring no  Witnessed apneas no  Still has daytime excessive sleepiness with [place] Sleepiness Scale of 11      Past Medical History:   Diagnosis Date   • Arthritis    • Asthma    • Bronchitis    • Cataract    • DDD (degenerative disc disease), lumbar    • Depression    • Disease of thyroid gland     • GERD (gastroesophageal reflux disease)    • Hypercholesteremia    • Hypertension    • JAY on CPAP    • Osteoporosis    • Thiamine deficiency    • TIA (transient ischemic attack)    • Vitamin B deficiency    • Wears glasses        Social history:  Shift work: No  Tobacco use: No  Alcohol use: No  Caffeinated drinks: Daily drinks decaf  Over-the-counter sleeping aid and medications: No  Narcotic medications: No    Family Hx  Family history of sleep apnea no  Positive for heart disease, diabetes, stroke    Medications: reviewed    Current Outpatient Medications:   •  albuterol (PROVENTIL HFA;VENTOLIN HFA) 108 (90 BASE) MCG/ACT inhaler, Inhale 2 puffs every 4 (four) hours as needed for wheezing., Disp: , Rfl:   •  aspirin 81 MG EC tablet, Take 81 mg by mouth Daily., Disp: , Rfl:   •  atorvastatin (LIPITOR) 10 MG tablet, Take 10 mg by mouth Daily., Disp: , Rfl:   •  azelastine (ASTELIN) 0.1 % nasal spray, 1 spray into each nostril 2 (two) times a day. Use in each nostril as directed, Disp: , Rfl:   •  azelastine (OPTIVAR) 0.05 % ophthalmic solution, Administer 1 drop to both eyes 2 (Two) Times a Day., Disp: 6 each, Rfl: 2  •  betamethasone dipropionate (DIPROLENE) 0.05 % cream, Apply 1 application topically 2 (two) times a day., Disp: , Rfl:   •  celecoxib (CeleBREX) 200 MG capsule, Take 200 mg by mouth daily., Disp: , Rfl:   •  Cyanocobalamin 1000 MCG/ML kit, Inject 1 mL as directed every 30 (thirty) days., Disp: , Rfl:   •  escitalopram (LEXAPRO) 20 MG tablet, Take 20 mg by mouth daily., Disp: , Rfl:   •  esomeprazole (NexIUM) 40 MG capsule, Take 40 mg by mouth 2 (two) times a day., Disp: , Rfl:   •  ferrous sulfate 325 (65 FE) MG tablet, Take 325 mg by mouth daily with breakfast., Disp: , Rfl:   •  fexofenadine (ALLEGRA) 180 MG tablet, Take 180 mg by mouth daily., Disp: , Rfl:   •  fluticasone (FLONASE) 50 MCG/ACT nasal spray, 2 sprays into each nostril daily. Administer 2 sprays in each nostril for each dose.,  Disp: , Rfl:   •  Fluticasone Furoate-Vilanterol (BREO ELLIPTA IN), Inhale 2 puffs Daily., Disp: , Rfl:   •  hydrochlorothiazide (HYDRODIURIL) 12.5 MG tablet, Take 12.5 mg by mouth daily., Disp: , Rfl:   •  levalbuterol (XOPENEX) 0.63 MG/3ML nebulizer solution, Take 1 ampule by nebulization every 4 (four) hours as needed for wheezing., Disp: , Rfl:   •  levothyroxine (SYNTHROID, LEVOTHROID) 50 MCG tablet, Take 50 mcg by mouth daily., Disp: , Rfl:   •  meloxicam (MOBIC) 15 MG tablet, Take 15 mg by mouth daily., Disp: , Rfl:   •  nystatin (MYCOSTATIN) powder, Apply  topically 3 (three) times a day., Disp: , Rfl:   •  omalizumab (XOLAIR) 150 MG injection, Inject 150 mg under the skin every 30 (thirty) days., Disp: , Rfl:   •  potassium chloride (K-DUR,KLOR-CON) 20 MEQ CR tablet, Take 20 mEq by mouth 2 (two) times a day., Disp: , Rfl:   •  Probiotic Product (FLORAJEN3) capsule, Take 1 tablet by mouth daily., Disp: , Rfl:   •  thiamine (B-1) 100 MG/ML injection, Inject 2 mL into the appropriate muscle as directed by prescriber Every 30 (Thirty) Days., Disp: 2 mL, Rfl: 2  •  zolpidem (AMBIEN) 5 MG tablet, Take 1 tablet by mouth Take As Directed for 2 doses. Bring the medication to the sleep lab. DO NOT USE AT HOME, Disp: 2 tablet, Rfl: 0    Current Facility-Administered Medications:   •  cyanocobalamin injection 1,000 mcg, 1,000 mcg, Intramuscular, Q28 Days, Kehrer, Meredith Lea, MD, 1,000 mcg at 08/12/19 1048  •  thiamine (B-1) injection 200 mg, 200 mg, Intramuscular, Daily, Kehrer, Meredith Lea, MD, 200 mg at 08/12/19 1048    Review of systems:  [place] Sleepiness Scale: Total score: 11   Positive for snoring, witnessed apneas, fatigue and daytime excessive sleepiness,   Negative for shortness of breath, cough, wheezing, chest pain, nausea and vomiting, palpitation, swelling of feet:    Morning headaches: No  Awaken with sore throat or dry mouth : Yes  Leg jerking at night: No  Crawly feeling/urge sensation to move in  "the legs: No  Teeth grinding: No  Sleepwalking, nightmares, muscle weakness with laughing or anger,sleep paralysis: No  Nasal Congestion: No  Nocturia (how many times/night): 2  Memory Problem: No    Physical exam:  Vitals:    08/29/19 1332   BP: 109/61   BP Location: Left arm   Patient Position: Sitting   Cuff Size: Large Adult   Pulse: 69   SpO2: 92%   Weight: 111 kg (245 lb 9.6 oz)   Height: 164.5 cm (64.75\")    Body mass index is 41.19 kg/m². Neck Circumference: 16.25 inches  HEENT: Head is atraumatic, normocephalic   Eyes:pupils are round equal and reacting to light and accommodation, conjunctiva normal  Nose:no nasal septal defects or deviation and the nasal passages are clear, no nasal polyps,   Throat: tonsils are not enlarged, tongue normal size, oral airway Mallampati class 3  NECK: No lymphadenopathy, trachea is in the midline, thyroid not enlarged  RESPIRATORY SYSTEM: Breath sounds are equal on both sides, there are no wheezes or crackles  CARDIOVASULAR SYSTEM: Heart sounds are regular rhythm and jimmy rate, there are no murmurs or thrills  ABDOMEN: Soft, no hepatosplenomegaly, no evidence of ascites  EXTREMITES: No cyanosis, clubbing or edema   NEUROLOGICAL SYSTEM: Oriented x 3, no gross neurological defects, gait normal    Medical records and labs reviewed in [facility].    Assessment and plan:  · Sleep apnea obstructive, (G47.33) patient has a diagnosis of sleep apnea is been on CPAP for 8 years.  Unfortunately her CPAP is not helping her.  She does has more symptoms.  Initially the CPAP helped her.  Now she has daytime excessive sleepiness and is tired all the time.  I feel that she has failed to CPAP and she needs a BiPAP titration.  I have talked to the patient about the testing and will schedule a split-night study with a BiPAP titration as she has failed CPAP.  I have also given a prescription for zolpidem for 2 tablets to be brought to the sleep center for the study  · Obesity, patient's BMI is Body " mass index is 41.19 kg/m².. I have discussed the relationship between weight and sleep apnea. Weight reduction is encouraged.  I will also discussed with the patient diet and exercise.  · Hypersomnia with [place] Sleepiness Scale of Total score: 11 due to sleep apnea.  Patient has failed on CPAP  · Insomnia.  Psychophysiological insomnia.  I have talked to the patient about sleep hygiene and instructed her to keep up regular sleep time and wake time.  She is also instructed to take melatonin about 2 hours before bedtime.  · Persistent asthma who is on Xolair which has significantly improved the patient and she gets monthly injections          Erica Santa MD, [facility]  Sleep Medicine.(Board-certified)  [facility]  8/29/2019 ,

## 2019-09-05 ENCOUNTER — APPOINTMENT (OUTPATIENT)
Dept: INFUSION THERAPY | Facility: HOSPITAL | Age: 69
End: 2019-09-05

## 2019-09-06 RX ORDER — ZOLPIDEM TARTRATE 5 MG/1
5 TABLET ORAL TAKE AS DIRECTED
Qty: 2 TABLET | Refills: 0 | Status: SHIPPED | OUTPATIENT
Start: 2019-09-06 | End: 2019-10-11

## 2019-09-12 ENCOUNTER — HOSPITAL ENCOUNTER (OUTPATIENT)
Dept: INFUSION THERAPY | Facility: HOSPITAL | Age: 69
Discharge: HOME | End: 2019-09-12
Admitting: ALLERGY & IMMUNOLOGY

## 2019-09-12 VITALS
DIASTOLIC BLOOD PRESSURE: 69 MMHG | SYSTOLIC BLOOD PRESSURE: 120 MMHG | OXYGEN SATURATION: 97 % | TEMPERATURE: 95.9 F | RESPIRATION RATE: 16 BRPM | HEART RATE: 65 BPM

## 2019-09-12 DIAGNOSIS — J45.40 MODERATE PERSISTENT ASTHMA, UNSPECIFIED WHETHER COMPLICATED: Primary | ICD-10-CM

## 2019-09-12 PROCEDURE — 96372 THER/PROPH/DIAG INJ SC/IM: CPT

## 2019-09-12 PROCEDURE — 25010000002 OMALIZUMAB PER 5 MG: Performed by: ALLERGY & IMMUNOLOGY

## 2019-09-12 RX ADMIN — OMALIZUMAB 150 MG: 202.5 INJECTION, SOLUTION SUBCUTANEOUS at 09:46

## 2019-09-12 NOTE — PROGRESS NOTES
Tolerated injection well.  Vital signs stable.  Monitored 30 minutes post injection per MD order without incident.  Pt DC with sister @ 1030.

## 2019-09-15 ENCOUNTER — HOSPITAL ENCOUNTER (OUTPATIENT)
Dept: SLEEP MEDICINE | Facility: HOSPITAL | Age: 69
Discharge: HOME | End: 2019-09-15
Admitting: INTERNAL MEDICINE

## 2019-09-15 DIAGNOSIS — G47.10 HYPERSOMNIA: ICD-10-CM

## 2019-09-15 DIAGNOSIS — R53.82 CHRONIC FATIGUE: ICD-10-CM

## 2019-09-15 DIAGNOSIS — G47.8 NON-RESTORATIVE SLEEP: ICD-10-CM

## 2019-09-15 DIAGNOSIS — G47.33 OSA ON CPAP: ICD-10-CM

## 2019-09-15 DIAGNOSIS — Z99.89 OSA ON CPAP: ICD-10-CM

## 2019-09-15 DIAGNOSIS — J45.40 MODERATE PERSISTENT ASTHMA, UNSPECIFIED WHETHER COMPLICATED: ICD-10-CM

## 2019-09-15 PROCEDURE — 95810 POLYSOM 6/> YRS 4/> PARAM: CPT | Performed by: INTERNAL MEDICINE

## 2019-09-15 PROCEDURE — 95810 POLYSOM 6/> YRS 4/> PARAM: CPT

## 2019-09-17 ENCOUNTER — CLINICAL SUPPORT (OUTPATIENT)
Dept: FAMILY MEDICINE CLINIC | Facility: CLINIC | Age: 69
End: 2019-09-17

## 2019-09-17 DIAGNOSIS — E53.8 B12 DEFICIENCY: Primary | ICD-10-CM

## 2019-09-17 RX ORDER — CYANOCOBALAMIN 1000 UG/ML
1000 INJECTION, SOLUTION INTRAMUSCULAR; SUBCUTANEOUS
Status: DISCONTINUED | OUTPATIENT
Start: 2019-09-17 | End: 2019-10-29

## 2019-09-17 RX ADMIN — CYANOCOBALAMIN 1000 MCG: 1000 INJECTION, SOLUTION INTRAMUSCULAR; SUBCUTANEOUS at 11:18

## 2019-09-27 ENCOUNTER — TELEPHONE (OUTPATIENT)
Dept: SLEEP MEDICINE | Facility: HOSPITAL | Age: 69
End: 2019-09-27

## 2019-10-02 ENCOUNTER — TELEPHONE (OUTPATIENT)
Dept: SLEEP MEDICINE | Facility: HOSPITAL | Age: 69
End: 2019-10-02

## 2019-10-03 ENCOUNTER — APPOINTMENT (OUTPATIENT)
Dept: INFUSION THERAPY | Facility: HOSPITAL | Age: 69
End: 2019-10-03

## 2019-10-09 RX ORDER — ESOMEPRAZOLE MAGNESIUM 40 MG/1
40 CAPSULE, DELAYED RELEASE ORAL 2 TIMES DAILY
Qty: 180 CAPSULE | Refills: 0 | Status: SHIPPED | OUTPATIENT
Start: 2019-10-09 | End: 2019-10-29 | Stop reason: SDUPTHER

## 2019-10-10 ENCOUNTER — HOSPITAL ENCOUNTER (OUTPATIENT)
Dept: INFUSION THERAPY | Facility: HOSPITAL | Age: 69
Discharge: HOME | End: 2019-10-10
Admitting: ALLERGY & IMMUNOLOGY

## 2019-10-10 VITALS
TEMPERATURE: 97.5 F | OXYGEN SATURATION: 94 % | WEIGHT: 239 LBS | SYSTOLIC BLOOD PRESSURE: 118 MMHG | BODY MASS INDEX: 40.08 KG/M2 | RESPIRATION RATE: 20 BRPM | DIASTOLIC BLOOD PRESSURE: 74 MMHG | HEART RATE: 72 BPM

## 2019-10-10 DIAGNOSIS — J45.40 MODERATE PERSISTENT ASTHMA, UNSPECIFIED WHETHER COMPLICATED: Primary | ICD-10-CM

## 2019-10-10 PROCEDURE — 25010000002 OMALIZUMAB PER 5 MG: Performed by: ALLERGY & IMMUNOLOGY

## 2019-10-10 PROCEDURE — 96372 THER/PROPH/DIAG INJ SC/IM: CPT

## 2019-10-10 RX ADMIN — OMALIZUMAB 150 MG: 202.5 INJECTION, SOLUTION SUBCUTANEOUS at 10:22

## 2019-10-10 NOTE — PROGRESS NOTES
Tolerated injection well.  Vital signs stable.  Monitored for 30 minutes post injection per MD order without incident.  Pt DC per ambulation @ 1100.

## 2019-10-11 ENCOUNTER — APPOINTMENT (OUTPATIENT)
Dept: PREADMISSION TESTING | Facility: HOSPITAL | Age: 69
End: 2019-10-11

## 2019-10-11 VITALS
RESPIRATION RATE: 20 BRPM | BODY MASS INDEX: 40.63 KG/M2 | TEMPERATURE: 97.1 F | WEIGHT: 238 LBS | OXYGEN SATURATION: 94 % | DIASTOLIC BLOOD PRESSURE: 73 MMHG | HEIGHT: 64 IN | HEART RATE: 78 BPM | SYSTOLIC BLOOD PRESSURE: 129 MMHG

## 2019-10-11 LAB
ANION GAP SERPL CALCULATED.3IONS-SCNC: 11.7 MMOL/L (ref 5–15)
BUN BLD-MCNC: 18 MG/DL (ref 8–23)
BUN/CREAT SERPL: 20.5 (ref 7–25)
CALCIUM SPEC-SCNC: 9.1 MG/DL (ref 8.6–10.5)
CHLORIDE SERPL-SCNC: 101 MMOL/L (ref 98–107)
CO2 SERPL-SCNC: 28.3 MMOL/L (ref 22–29)
CREAT BLD-MCNC: 0.88 MG/DL (ref 0.57–1)
DEPRECATED RDW RBC AUTO: 41 FL (ref 37–54)
ERYTHROCYTE [DISTWIDTH] IN BLOOD BY AUTOMATED COUNT: 13 % (ref 12.3–15.4)
GFR SERPL CREATININE-BSD FRML MDRD: 64 ML/MIN/1.73
GLUCOSE BLD-MCNC: 90 MG/DL (ref 65–99)
HCT VFR BLD AUTO: 40.9 % (ref 34–46.6)
HGB BLD-MCNC: 13.7 G/DL (ref 12–15.9)
MCH RBC QN AUTO: 29.1 PG (ref 26.6–33)
MCHC RBC AUTO-ENTMCNC: 33.5 G/DL (ref 31.5–35.7)
MCV RBC AUTO: 87 FL (ref 79–97)
PLATELET # BLD AUTO: 246 10*3/MM3 (ref 140–450)
PMV BLD AUTO: 9.6 FL (ref 6–12)
POTASSIUM BLD-SCNC: 3.8 MMOL/L (ref 3.5–5.2)
RBC # BLD AUTO: 4.7 10*6/MM3 (ref 3.77–5.28)
SODIUM BLD-SCNC: 141 MMOL/L (ref 136–145)
WBC NRBC COR # BLD: 8.12 10*3/MM3 (ref 3.4–10.8)

## 2019-10-11 PROCEDURE — 85027 COMPLETE CBC AUTOMATED: CPT | Performed by: PLASTIC SURGERY

## 2019-10-11 PROCEDURE — 93005 ELECTROCARDIOGRAM TRACING: CPT

## 2019-10-11 PROCEDURE — 93010 ELECTROCARDIOGRAM REPORT: CPT | Performed by: INTERNAL MEDICINE

## 2019-10-11 PROCEDURE — 80048 BASIC METABOLIC PNL TOTAL CA: CPT | Performed by: PLASTIC SURGERY

## 2019-10-11 PROCEDURE — 36415 COLL VENOUS BLD VENIPUNCTURE: CPT

## 2019-10-11 RX ORDER — CHOLECALCIFEROL (VITAMIN D3) 125 MCG
5 CAPSULE ORAL NIGHTLY PRN
COMMUNITY
End: 2019-11-02 | Stop reason: HOSPADM

## 2019-10-11 NOTE — DISCHARGE INSTRUCTIONS
Take the following medications the morning of surgery with a small sip of water:    INHALERS/NEBULIZERS, NEXIUM, FLONASE, LEVOTHYROXINE    ARRIVE TO MAIN SURGERY AT 6:30 AM ON 11/1/19      General Instructions:  • Do not eat solid food after midnight the night before surgery.  • You may drink clear liquids day of surgery but must stop at least one hour before your hospital arrival time.  • It is beneficial for you to have a clear drink that contains carbohydrates the day of surgery.  We suggest a 12 to 20 ounce bottle of Gatorade or Powerade for non-diabetic patients or a 12 to 20 ounce bottle of G2 or Powerade Zero for diabetic patients. (Pediatric patients, are not advised to drink a 12 to 20 ounce carbohydrate drink)    Clear liquids are liquids you can see through.  Nothing red in color.     Plain water                               Sports drinks  Sodas                                   Gelatin (Jell-O)  Fruit juices without pulp such as white grape juice and apple juice  Popsicles that contain no fruit or yogurt  Tea or coffee (no cream or milk added)  Gatorade / Powerade  G2 / Powerade Zero    • Infants may have breast milk up to four hours before surgery.  • Infants drinking formula may drink formula up to six hours before surgery.   • Patients who avoid smoking, chewing tobacco and alcohol for 4 weeks prior to surgery have a reduced risk of post-operative complications.  Quit smoking as many days before surgery as you can.  • Do not smoke, use chewing tobacco or drink alcohol the day of surgery.   • If applicable bring your C-PAP/ BI-PAP machine.  • Bring any papers given to you in the doctor’s office.  • Wear clean comfortable clothes.  • Do not wear contact lenses, false eyelashes or make-up.  Bring a case for your glasses.   • Bring crutches or walker if applicable.  • Remove all piercings.  Leave jewelry and any other valuables at home.  • Hair extensions with metal clips must be removed prior to  surgery.  • The Pre-Admission Testing nurse will instruct you to bring medications if unable to obtain an accurate list in Pre-Admission Testing.          Preventing a Surgical Site Infection:  • For 2 to 3 days before surgery, avoid shaving with a razor because the razor can irritate skin and make it easier to develop an infection.    • Any areas of open skin can increase the risk of a post-operative wound infection by allowing bacteria to enter and travel throughout the body.  Notify your surgeon if you have any skin wounds / rashes even if it is not near the expected surgical site.  The area will need assessed to determine if surgery should be delayed until it is healed.  • The night prior to surgery sleep in a clean bed with clean clothing.  Do not allow pets to sleep with you.  • Shower on the morning of surgery using a fresh bar of anti-bacterial soap (such as Dial) and clean washcloth.  Dry with a clean towel and dress in clean clothing.  • Ask your surgeon if you will be receiving antibiotics prior to surgery.  • Make sure you, your family, and all healthcare providers clean their hands with soap and water or an alcohol based hand  before caring for you or your wound.    Day of surgery:  Your arrival time is approximately two hours before your scheduled surgery time.  Upon arrival, a Pre-op nurse and Anesthesiologist will review your health history, obtain vital signs, and answer questions you may have.  The only belongings needed at this time will be a list of your home medications and if applicable your C-PAP/BI-PAP machine.  If you are staying overnight your family can leave the rest of your belongings in the car and bring them to your room later.  A Pre-op nurse will start an IV and you may receive medication in preparation for surgery, including something to help you relax.  Your family will be able to see you in the Pre-op area.  Two visitors at a time will be allowed in the Pre-op room.   While you are in surgery your family should notify the waiting room  if they leave the waiting room area and provide a contact phone number.    Please be aware that surgery does come with discomfort.  We want to make every effort to control your discomfort so please discuss any uncontrolled symptoms with your nurse.   Your doctor will most likely have prescribed pain medications.      If you are going home after surgery you will receive individualized written care instructions before being discharged.  A responsible adult must drive you to and from the hospital on the day of your surgery and stay with you for 24 hours.    If you are staying overnight following surgery, you will be transported to your hospital room following the recovery period.  [facility] has all private rooms.    You have received a list of surgical assistants for your reference.  If you have any questions please call Pre-Admission Testing at [phone].  Deductibles and co-payments are collected on the day of service. Please be prepared to pay the required co-pay, deductible or deposit on the day of service as defined by your plan.

## 2019-10-15 RX ORDER — POTASSIUM CHLORIDE 20 MEQ/1
20 TABLET, EXTENDED RELEASE ORAL 2 TIMES DAILY
Qty: 180 TABLET | Refills: 0 | Status: SHIPPED | OUTPATIENT
Start: 2019-10-15 | End: 2019-12-13 | Stop reason: SDUPTHER

## 2019-10-24 ENCOUNTER — APPOINTMENT (OUTPATIENT)
Dept: SLEEP MEDICINE | Facility: HOSPITAL | Age: 69
End: 2019-10-24

## 2019-10-29 ENCOUNTER — CLINICAL SUPPORT (OUTPATIENT)
Dept: FAMILY MEDICINE CLINIC | Facility: CLINIC | Age: 69
End: 2019-10-29

## 2019-10-29 DIAGNOSIS — E53.8 B12 DEFICIENCY: Primary | ICD-10-CM

## 2019-10-29 PROCEDURE — 96372 THER/PROPH/DIAG INJ SC/IM: CPT | Performed by: FAMILY MEDICINE

## 2019-10-29 RX ORDER — ESOMEPRAZOLE MAGNESIUM 40 MG/1
40 CAPSULE, DELAYED RELEASE ORAL 2 TIMES DAILY
Qty: 180 CAPSULE | Refills: 0 | Status: SHIPPED | OUTPATIENT
Start: 2019-10-29 | End: 2019-12-13 | Stop reason: SDUPTHER

## 2019-10-29 RX ORDER — CYANOCOBALAMIN 1000 UG/ML
1000 INJECTION, SOLUTION INTRAMUSCULAR; SUBCUTANEOUS
Status: DISCONTINUED | OUTPATIENT
Start: 2019-10-29 | End: 2019-10-29

## 2019-10-29 RX ORDER — LEVOTHYROXINE SODIUM 0.05 MG/1
50 TABLET ORAL DAILY
Qty: 90 TABLET | Refills: 0 | Status: SHIPPED | OUTPATIENT
Start: 2019-10-29 | End: 2019-12-13 | Stop reason: SDUPTHER

## 2019-10-29 RX ORDER — HYDROCHLOROTHIAZIDE 12.5 MG/1
12.5 TABLET ORAL DAILY
Qty: 90 TABLET | Refills: 0 | Status: SHIPPED | OUTPATIENT
Start: 2019-10-29 | End: 2019-11-06

## 2019-10-29 RX ORDER — ESCITALOPRAM OXALATE 20 MG/1
20 TABLET ORAL DAILY
Qty: 90 TABLET | Refills: 0 | Status: SHIPPED | OUTPATIENT
Start: 2019-10-29 | End: 2019-12-13 | Stop reason: SDUPTHER

## 2019-10-29 RX ORDER — ATORVASTATIN CALCIUM 20 MG/1
20 TABLET, FILM COATED ORAL DAILY
Qty: 90 TABLET | Refills: 0 | Status: SHIPPED | OUTPATIENT
Start: 2019-10-29 | End: 2019-12-13

## 2019-10-29 RX ADMIN — CYANOCOBALAMIN 1000 MCG: 1000 INJECTION, SOLUTION INTRAMUSCULAR; SUBCUTANEOUS at 16:00

## 2019-10-31 ENCOUNTER — APPOINTMENT (OUTPATIENT)
Dept: INFUSION THERAPY | Facility: HOSPITAL | Age: 69
End: 2019-10-31

## 2019-11-01 ENCOUNTER — ANESTHESIA (OUTPATIENT)
Dept: PERIOP | Facility: HOSPITAL | Age: 69
End: 2019-11-01

## 2019-11-01 ENCOUNTER — HOSPITAL ENCOUNTER (OUTPATIENT)
Facility: HOSPITAL | Age: 69
Discharge: HOME | End: 2019-11-02
Attending: PLASTIC SURGERY | Admitting: PLASTIC SURGERY

## 2019-11-01 ENCOUNTER — ANESTHESIA EVENT (OUTPATIENT)
Dept: PERIOP | Facility: HOSPITAL | Age: 69
End: 2019-11-01

## 2019-11-01 PROBLEM — E65 PANNICULUS: Status: ACTIVE | Noted: 2019-11-01

## 2019-11-01 PROBLEM — Z98.890 S/P ABDOMINOPLASTY: Status: ACTIVE | Noted: 2019-11-01

## 2019-11-01 PROCEDURE — 25010000002 DEXAMETHASONE PER 1 MG: Performed by: REGISTERED NURSE

## 2019-11-01 PROCEDURE — 63710000001 KETOTIFEN 0.025 % SOLUTION 5 ML BOTTLE: Performed by: PLASTIC SURGERY

## 2019-11-01 PROCEDURE — G0378 HOSPITAL OBSERVATION PER HR: HCPCS

## 2019-11-01 PROCEDURE — 63710000001 OXYCODONE-ACETAMINOPHEN 7.5-325 MG TABLET: Performed by: PLASTIC SURGERY

## 2019-11-01 PROCEDURE — 63710000001 AZELASTINE 0.1 % SOLUTION 30 ML BOTTLE: Performed by: PLASTIC SURGERY

## 2019-11-01 PROCEDURE — 25010000002 FENTANYL CITRATE (PF) 100 MCG/2ML SOLUTION: Performed by: REGISTERED NURSE

## 2019-11-01 PROCEDURE — 25010000002 MIDAZOLAM PER 1 MG: Performed by: ANESTHESIOLOGY

## 2019-11-01 PROCEDURE — 25010000002 PROMETHAZINE PER 50 MG: Performed by: PLASTIC SURGERY

## 2019-11-01 PROCEDURE — 25010000002 NEOSTIGMINE PER 0.5 MG: Performed by: REGISTERED NURSE

## 2019-11-01 PROCEDURE — A9270 NON-COVERED ITEM OR SERVICE: HCPCS | Performed by: PLASTIC SURGERY

## 2019-11-01 PROCEDURE — 94799 UNLISTED PULMONARY SVC/PX: CPT

## 2019-11-01 PROCEDURE — 25010000002 PROPOFOL 10 MG/ML EMULSION: Performed by: REGISTERED NURSE

## 2019-11-01 PROCEDURE — 25010000002 SUCCINYLCHOLINE PER 20 MG: Performed by: REGISTERED NURSE

## 2019-11-01 PROCEDURE — 25010000002 ONDANSETRON PER 1 MG: Performed by: REGISTERED NURSE

## 2019-11-01 PROCEDURE — 25010000003 CEFAZOLIN 1-4 GM/50ML-% SOLUTION: Performed by: PLASTIC SURGERY

## 2019-11-01 PROCEDURE — 25010000002 HYDROMORPHONE PER 4 MG: Performed by: REGISTERED NURSE

## 2019-11-01 PROCEDURE — 25010000003 CEFAZOLIN IN DEXTROSE 2-4 GM/100ML-% SOLUTION: Performed by: PLASTIC SURGERY

## 2019-11-01 RX ORDER — PROMETHAZINE HYDROCHLORIDE 25 MG/ML
6.25 INJECTION, SOLUTION INTRAMUSCULAR; INTRAVENOUS
Status: DISCONTINUED | OUTPATIENT
Start: 2019-11-01 | End: 2019-11-01 | Stop reason: HOSPADM

## 2019-11-01 RX ORDER — LABETALOL HYDROCHLORIDE 5 MG/ML
5 INJECTION, SOLUTION INTRAVENOUS
Status: DISCONTINUED | OUTPATIENT
Start: 2019-11-01 | End: 2019-11-01 | Stop reason: HOSPADM

## 2019-11-01 RX ORDER — SODIUM CHLORIDE 0.9 % (FLUSH) 0.9 %
10 SYRINGE (ML) INJECTION AS NEEDED
Status: DISCONTINUED | OUTPATIENT
Start: 2019-11-01 | End: 2019-11-02 | Stop reason: HOSPADM

## 2019-11-01 RX ORDER — EPHEDRINE SULFATE 50 MG/ML
5 INJECTION, SOLUTION INTRAVENOUS ONCE AS NEEDED
Status: DISCONTINUED | OUTPATIENT
Start: 2019-11-01 | End: 2019-11-01 | Stop reason: HOSPADM

## 2019-11-01 RX ORDER — DIPHENHYDRAMINE HCL 25 MG
25 CAPSULE ORAL
Status: DISCONTINUED | OUTPATIENT
Start: 2019-11-01 | End: 2019-11-01 | Stop reason: HOSPADM

## 2019-11-01 RX ORDER — DEXAMETHASONE SODIUM PHOSPHATE 10 MG/ML
INJECTION INTRAMUSCULAR; INTRAVENOUS AS NEEDED
Status: DISCONTINUED | OUTPATIENT
Start: 2019-11-01 | End: 2019-11-01 | Stop reason: SURG

## 2019-11-01 RX ORDER — OXYCODONE AND ACETAMINOPHEN 7.5; 325 MG/1; MG/1
1 TABLET ORAL EVERY 4 HOURS PRN
Status: DISCONTINUED | OUTPATIENT
Start: 2019-11-01 | End: 2019-11-02 | Stop reason: HOSPADM

## 2019-11-01 RX ORDER — NALOXONE HCL 0.4 MG/ML
0.2 VIAL (ML) INJECTION AS NEEDED
Status: DISCONTINUED | OUTPATIENT
Start: 2019-11-01 | End: 2019-11-01 | Stop reason: HOSPADM

## 2019-11-01 RX ORDER — PROPOFOL 10 MG/ML
VIAL (ML) INTRAVENOUS AS NEEDED
Status: DISCONTINUED | OUTPATIENT
Start: 2019-11-01 | End: 2019-11-01 | Stop reason: SURG

## 2019-11-01 RX ORDER — ALBUTEROL SULFATE 2.5 MG/3ML
2.5 SOLUTION RESPIRATORY (INHALATION) EVERY 4 HOURS PRN
Status: DISCONTINUED | OUTPATIENT
Start: 2019-11-01 | End: 2019-11-01 | Stop reason: SDUPTHER

## 2019-11-01 RX ORDER — OXYCODONE AND ACETAMINOPHEN 7.5; 325 MG/1; MG/1
1 TABLET ORAL ONCE AS NEEDED
Status: DISCONTINUED | OUTPATIENT
Start: 2019-11-01 | End: 2019-11-01 | Stop reason: HOSPADM

## 2019-11-01 RX ORDER — PROMETHAZINE HYDROCHLORIDE 25 MG/1
25 TABLET ORAL ONCE AS NEEDED
Status: DISCONTINUED | OUTPATIENT
Start: 2019-11-01 | End: 2019-11-01 | Stop reason: HOSPADM

## 2019-11-01 RX ORDER — HYDROCHLOROTHIAZIDE 12.5 MG/1
12.5 TABLET ORAL DAILY
Status: DISCONTINUED | OUTPATIENT
Start: 2019-11-02 | End: 2019-11-02 | Stop reason: HOSPADM

## 2019-11-01 RX ORDER — PANTOPRAZOLE SODIUM 40 MG/1
40 TABLET, DELAYED RELEASE ORAL EVERY MORNING
Status: DISCONTINUED | OUTPATIENT
Start: 2019-11-02 | End: 2019-11-02 | Stop reason: HOSPADM

## 2019-11-01 RX ORDER — ONDANSETRON 2 MG/ML
4 INJECTION INTRAMUSCULAR; INTRAVENOUS EVERY 6 HOURS PRN
Status: DISCONTINUED | OUTPATIENT
Start: 2019-11-01 | End: 2019-11-02 | Stop reason: HOSPADM

## 2019-11-01 RX ORDER — AZELASTINE 1 MG/ML
1 SPRAY, METERED NASAL 2 TIMES DAILY
Status: DISCONTINUED | OUTPATIENT
Start: 2019-11-01 | End: 2019-11-02 | Stop reason: HOSPADM

## 2019-11-01 RX ORDER — ONDANSETRON 2 MG/ML
4 INJECTION INTRAMUSCULAR; INTRAVENOUS ONCE AS NEEDED
Status: DISCONTINUED | OUTPATIENT
Start: 2019-11-01 | End: 2019-11-01 | Stop reason: HOSPADM

## 2019-11-01 RX ORDER — LIDOCAINE HYDROCHLORIDE 10 MG/ML
0.5 INJECTION, SOLUTION EPIDURAL; INFILTRATION; INTRACAUDAL; PERINEURAL ONCE AS NEEDED
Status: DISCONTINUED | OUTPATIENT
Start: 2019-11-01 | End: 2019-11-01 | Stop reason: HOSPADM

## 2019-11-01 RX ORDER — PROMETHAZINE HYDROCHLORIDE 25 MG/1
25 TABLET ORAL EVERY 6 HOURS PRN
Status: DISCONTINUED | OUTPATIENT
Start: 2019-11-01 | End: 2019-11-02 | Stop reason: HOSPADM

## 2019-11-01 RX ORDER — CELECOXIB 200 MG/1
200 CAPSULE ORAL DAILY
Status: DISCONTINUED | OUTPATIENT
Start: 2019-11-01 | End: 2019-11-02 | Stop reason: HOSPADM

## 2019-11-01 RX ORDER — MIDAZOLAM HYDROCHLORIDE 1 MG/ML
2 INJECTION INTRAMUSCULAR; INTRAVENOUS
Status: DISCONTINUED | OUTPATIENT
Start: 2019-11-01 | End: 2019-11-01 | Stop reason: HOSPADM

## 2019-11-01 RX ORDER — EPHEDRINE SULFATE 50 MG/ML
INJECTION, SOLUTION INTRAVENOUS AS NEEDED
Status: DISCONTINUED | OUTPATIENT
Start: 2019-11-01 | End: 2019-11-01 | Stop reason: SURG

## 2019-11-01 RX ORDER — LEVOTHYROXINE SODIUM 0.05 MG/1
50 TABLET ORAL
Status: DISCONTINUED | OUTPATIENT
Start: 2019-11-02 | End: 2019-11-02 | Stop reason: HOSPADM

## 2019-11-01 RX ORDER — BUPIVACAINE HYDROCHLORIDE AND EPINEPHRINE 2.5; 5 MG/ML; UG/ML
INJECTION, SOLUTION INFILTRATION; PERINEURAL AS NEEDED
Status: DISCONTINUED | OUTPATIENT
Start: 2019-11-01 | End: 2019-11-01 | Stop reason: HOSPADM

## 2019-11-01 RX ORDER — FENTANYL CITRATE 50 UG/ML
50 INJECTION, SOLUTION INTRAMUSCULAR; INTRAVENOUS
Status: DISCONTINUED | OUTPATIENT
Start: 2019-11-01 | End: 2019-11-01 | Stop reason: HOSPADM

## 2019-11-01 RX ORDER — CEFAZOLIN SODIUM 1 G/50ML
1 INJECTION, SOLUTION INTRAVENOUS EVERY 8 HOURS
Status: COMPLETED | OUTPATIENT
Start: 2019-11-01 | End: 2019-11-02

## 2019-11-01 RX ORDER — ATORVASTATIN CALCIUM 20 MG/1
20 TABLET, FILM COATED ORAL DAILY
Status: DISCONTINUED | OUTPATIENT
Start: 2019-11-02 | End: 2019-11-02 | Stop reason: HOSPADM

## 2019-11-01 RX ORDER — ESCITALOPRAM OXALATE 20 MG/1
20 TABLET ORAL DAILY
Status: DISCONTINUED | OUTPATIENT
Start: 2019-11-02 | End: 2019-11-02 | Stop reason: HOSPADM

## 2019-11-01 RX ORDER — HYDROCODONE BITARTRATE AND ACETAMINOPHEN 7.5; 325 MG/1; MG/1
1 TABLET ORAL ONCE AS NEEDED
Status: DISCONTINUED | OUTPATIENT
Start: 2019-11-01 | End: 2019-11-01 | Stop reason: HOSPADM

## 2019-11-01 RX ORDER — HYDRALAZINE HYDROCHLORIDE 20 MG/ML
5 INJECTION INTRAMUSCULAR; INTRAVENOUS
Status: DISCONTINUED | OUTPATIENT
Start: 2019-11-01 | End: 2019-11-01 | Stop reason: HOSPADM

## 2019-11-01 RX ORDER — ALBUTEROL SULFATE 2.5 MG/3ML
1.25 SOLUTION RESPIRATORY (INHALATION) EVERY 6 HOURS PRN
Status: DISCONTINUED | OUTPATIENT
Start: 2019-11-01 | End: 2019-11-02 | Stop reason: HOSPADM

## 2019-11-01 RX ORDER — SODIUM CHLORIDE, SODIUM LACTATE, POTASSIUM CHLORIDE, CALCIUM CHLORIDE 600; 310; 30; 20 MG/100ML; MG/100ML; MG/100ML; MG/100ML
9 INJECTION, SOLUTION INTRAVENOUS CONTINUOUS
Status: DISCONTINUED | OUTPATIENT
Start: 2019-11-01 | End: 2019-11-02 | Stop reason: HOSPADM

## 2019-11-01 RX ORDER — SODIUM CHLORIDE 0.9 % (FLUSH) 0.9 %
3 SYRINGE (ML) INJECTION EVERY 12 HOURS SCHEDULED
Status: DISCONTINUED | OUTPATIENT
Start: 2019-11-01 | End: 2019-11-02 | Stop reason: HOSPADM

## 2019-11-01 RX ORDER — HYDROMORPHONE HYDROCHLORIDE 1 MG/ML
0.5 INJECTION, SOLUTION INTRAMUSCULAR; INTRAVENOUS; SUBCUTANEOUS
Status: DISCONTINUED | OUTPATIENT
Start: 2019-11-01 | End: 2019-11-01 | Stop reason: HOSPADM

## 2019-11-01 RX ORDER — GLYCOPYRROLATE 0.2 MG/ML
INJECTION INTRAMUSCULAR; INTRAVENOUS AS NEEDED
Status: DISCONTINUED | OUTPATIENT
Start: 2019-11-01 | End: 2019-11-01 | Stop reason: SURG

## 2019-11-01 RX ORDER — PROMETHAZINE HYDROCHLORIDE 25 MG/ML
12.5 INJECTION, SOLUTION INTRAMUSCULAR; INTRAVENOUS ONCE AS NEEDED
Status: DISCONTINUED | OUTPATIENT
Start: 2019-11-01 | End: 2019-11-01 | Stop reason: HOSPADM

## 2019-11-01 RX ORDER — SODIUM CHLORIDE, SODIUM LACTATE, POTASSIUM CHLORIDE, CALCIUM CHLORIDE 600; 310; 30; 20 MG/100ML; MG/100ML; MG/100ML; MG/100ML
100 INJECTION, SOLUTION INTRAVENOUS CONTINUOUS
Status: DISCONTINUED | OUTPATIENT
Start: 2019-11-01 | End: 2019-11-02 | Stop reason: HOSPADM

## 2019-11-01 RX ORDER — ONDANSETRON 2 MG/ML
INJECTION INTRAMUSCULAR; INTRAVENOUS AS NEEDED
Status: DISCONTINUED | OUTPATIENT
Start: 2019-11-01 | End: 2019-11-01 | Stop reason: SURG

## 2019-11-01 RX ORDER — PROMETHAZINE HYDROCHLORIDE 25 MG/1
25 SUPPOSITORY RECTAL EVERY 6 HOURS PRN
Status: DISCONTINUED | OUTPATIENT
Start: 2019-11-01 | End: 2019-11-02 | Stop reason: HOSPADM

## 2019-11-01 RX ORDER — SUCCINYLCHOLINE CHLORIDE 20 MG/ML
INJECTION INTRAMUSCULAR; INTRAVENOUS AS NEEDED
Status: DISCONTINUED | OUTPATIENT
Start: 2019-11-01 | End: 2019-11-01 | Stop reason: SURG

## 2019-11-01 RX ORDER — FAMOTIDINE 10 MG/ML
20 INJECTION, SOLUTION INTRAVENOUS ONCE
Status: COMPLETED | OUTPATIENT
Start: 2019-11-01 | End: 2019-11-01

## 2019-11-01 RX ORDER — ROCURONIUM BROMIDE 10 MG/ML
INJECTION, SOLUTION INTRAVENOUS AS NEEDED
Status: DISCONTINUED | OUTPATIENT
Start: 2019-11-01 | End: 2019-11-01 | Stop reason: SURG

## 2019-11-01 RX ORDER — MORPHINE SULFATE 2 MG/ML
4 INJECTION, SOLUTION INTRAMUSCULAR; INTRAVENOUS
Status: DISCONTINUED | OUTPATIENT
Start: 2019-11-01 | End: 2019-11-02 | Stop reason: HOSPADM

## 2019-11-01 RX ORDER — SODIUM CHLORIDE 0.9 % (FLUSH) 0.9 %
3-10 SYRINGE (ML) INJECTION AS NEEDED
Status: DISCONTINUED | OUTPATIENT
Start: 2019-11-01 | End: 2019-11-01 | Stop reason: HOSPADM

## 2019-11-01 RX ORDER — ACETAMINOPHEN 325 MG/1
650 TABLET ORAL ONCE AS NEEDED
Status: DISCONTINUED | OUTPATIENT
Start: 2019-11-01 | End: 2019-11-01 | Stop reason: HOSPADM

## 2019-11-01 RX ORDER — DIPHENHYDRAMINE HYDROCHLORIDE 50 MG/ML
12.5 INJECTION INTRAMUSCULAR; INTRAVENOUS
Status: DISCONTINUED | OUTPATIENT
Start: 2019-11-01 | End: 2019-11-01 | Stop reason: HOSPADM

## 2019-11-01 RX ORDER — PROMETHAZINE HYDROCHLORIDE 25 MG/ML
12.5 INJECTION, SOLUTION INTRAMUSCULAR; INTRAVENOUS EVERY 6 HOURS PRN
Status: DISCONTINUED | OUTPATIENT
Start: 2019-11-01 | End: 2019-11-02 | Stop reason: HOSPADM

## 2019-11-01 RX ORDER — ONDANSETRON 4 MG/1
4 TABLET, FILM COATED ORAL EVERY 6 HOURS PRN
Status: DISCONTINUED | OUTPATIENT
Start: 2019-11-01 | End: 2019-11-02 | Stop reason: HOSPADM

## 2019-11-01 RX ORDER — FLUMAZENIL 0.1 MG/ML
0.2 INJECTION INTRAVENOUS AS NEEDED
Status: DISCONTINUED | OUTPATIENT
Start: 2019-11-01 | End: 2019-11-01 | Stop reason: HOSPADM

## 2019-11-01 RX ORDER — MAGNESIUM HYDROXIDE 1200 MG/15ML
LIQUID ORAL AS NEEDED
Status: DISCONTINUED | OUTPATIENT
Start: 2019-11-01 | End: 2019-11-01 | Stop reason: HOSPADM

## 2019-11-01 RX ORDER — KETAMINE HYDROCHLORIDE 10 MG/ML
INJECTION INTRAMUSCULAR; INTRAVENOUS AS NEEDED
Status: DISCONTINUED | OUTPATIENT
Start: 2019-11-01 | End: 2019-11-01 | Stop reason: SURG

## 2019-11-01 RX ORDER — CEFAZOLIN SODIUM 2 G/100ML
2 INJECTION, SOLUTION INTRAVENOUS ONCE
Status: COMPLETED | OUTPATIENT
Start: 2019-11-01 | End: 2019-11-01

## 2019-11-01 RX ORDER — DEXMEDETOMIDINE HYDROCHLORIDE 100 UG/ML
INJECTION, SOLUTION INTRAVENOUS AS NEEDED
Status: DISCONTINUED | OUTPATIENT
Start: 2019-11-01 | End: 2019-11-01 | Stop reason: SURG

## 2019-11-01 RX ORDER — KETOTIFEN FUMARATE 0.35 MG/ML
1 SOLUTION/ DROPS OPHTHALMIC 2 TIMES DAILY
Status: DISCONTINUED | OUTPATIENT
Start: 2019-11-01 | End: 2019-11-02 | Stop reason: HOSPADM

## 2019-11-01 RX ORDER — FENTANYL CITRATE 50 UG/ML
INJECTION, SOLUTION INTRAMUSCULAR; INTRAVENOUS AS NEEDED
Status: DISCONTINUED | OUTPATIENT
Start: 2019-11-01 | End: 2019-11-01 | Stop reason: SURG

## 2019-11-01 RX ORDER — HYDROMORPHONE HCL 110MG/55ML
PATIENT CONTROLLED ANALGESIA SYRINGE INTRAVENOUS AS NEEDED
Status: DISCONTINUED | OUTPATIENT
Start: 2019-11-01 | End: 2019-11-01 | Stop reason: SURG

## 2019-11-01 RX ORDER — SODIUM CHLORIDE 0.9 % (FLUSH) 0.9 %
3 SYRINGE (ML) INJECTION EVERY 12 HOURS SCHEDULED
Status: DISCONTINUED | OUTPATIENT
Start: 2019-11-01 | End: 2019-11-01 | Stop reason: HOSPADM

## 2019-11-01 RX ORDER — NALOXONE HCL 0.4 MG/ML
0.4 VIAL (ML) INJECTION
Status: DISCONTINUED | OUTPATIENT
Start: 2019-11-01 | End: 2019-11-02 | Stop reason: HOSPADM

## 2019-11-01 RX ORDER — SCOLOPAMINE TRANSDERMAL SYSTEM 1 MG/1
1 PATCH, EXTENDED RELEASE TRANSDERMAL ONCE
Status: DISCONTINUED | OUTPATIENT
Start: 2019-11-01 | End: 2019-11-01

## 2019-11-01 RX ORDER — ACETAMINOPHEN 325 MG/1
650 TABLET ORAL EVERY 4 HOURS PRN
Status: DISCONTINUED | OUTPATIENT
Start: 2019-11-01 | End: 2019-11-02 | Stop reason: HOSPADM

## 2019-11-01 RX ORDER — LIDOCAINE HYDROCHLORIDE 20 MG/ML
INJECTION, SOLUTION INFILTRATION; PERINEURAL AS NEEDED
Status: DISCONTINUED | OUTPATIENT
Start: 2019-11-01 | End: 2019-11-01 | Stop reason: SURG

## 2019-11-01 RX ORDER — MIDAZOLAM HYDROCHLORIDE 1 MG/ML
1 INJECTION INTRAMUSCULAR; INTRAVENOUS
Status: DISCONTINUED | OUTPATIENT
Start: 2019-11-01 | End: 2019-11-01 | Stop reason: HOSPADM

## 2019-11-01 RX ORDER — FLUTICASONE PROPIONATE 50 MCG
2 SPRAY, SUSPENSION (ML) NASAL DAILY
Status: DISCONTINUED | OUTPATIENT
Start: 2019-11-02 | End: 2019-11-02 | Stop reason: HOSPADM

## 2019-11-01 RX ORDER — POTASSIUM CHLORIDE 1.5 G/1.77G
20 POWDER, FOR SOLUTION ORAL DAILY
Status: DISCONTINUED | OUTPATIENT
Start: 2019-11-01 | End: 2019-11-02 | Stop reason: HOSPADM

## 2019-11-01 RX ORDER — PROMETHAZINE HYDROCHLORIDE 25 MG/1
25 SUPPOSITORY RECTAL ONCE AS NEEDED
Status: DISCONTINUED | OUTPATIENT
Start: 2019-11-01 | End: 2019-11-01 | Stop reason: HOSPADM

## 2019-11-01 RX ADMIN — SODIUM CHLORIDE, POTASSIUM CHLORIDE, SODIUM LACTATE AND CALCIUM CHLORIDE 100 ML/HR: 600; 310; 30; 20 INJECTION, SOLUTION INTRAVENOUS at 20:32

## 2019-11-01 RX ADMIN — HYDROMORPHONE HYDROCHLORIDE 0.5 MG: 2 INJECTION INTRAMUSCULAR; INTRAVENOUS; SUBCUTANEOUS at 10:26

## 2019-11-01 RX ADMIN — SODIUM CHLORIDE, POTASSIUM CHLORIDE, SODIUM LACTATE AND CALCIUM CHLORIDE: 600; 310; 30; 20 INJECTION, SOLUTION INTRAVENOUS at 11:45

## 2019-11-01 RX ADMIN — ONDANSETRON 4 MG: 2 INJECTION INTRAMUSCULAR; INTRAVENOUS at 13:06

## 2019-11-01 RX ADMIN — DEXMEDETOMIDINE HYDROCHLORIDE 4 MCG: 100 INJECTION, SOLUTION, CONCENTRATE INTRAVENOUS at 11:55

## 2019-11-01 RX ADMIN — EPHEDRINE SULFATE 10 MG: 50 INJECTION INTRAMUSCULAR; INTRAVENOUS; SUBCUTANEOUS at 09:37

## 2019-11-01 RX ADMIN — KETAMINE HYDROCHLORIDE 10 MG: 10 INJECTION INTRAMUSCULAR; INTRAVENOUS at 12:51

## 2019-11-01 RX ADMIN — DEXMEDETOMIDINE HYDROCHLORIDE 4 MCG: 100 INJECTION, SOLUTION, CONCENTRATE INTRAVENOUS at 11:08

## 2019-11-01 RX ADMIN — PROPOFOL 150 MG: 10 INJECTION, EMULSION INTRAVENOUS at 09:22

## 2019-11-01 RX ADMIN — DEXMEDETOMIDINE HYDROCHLORIDE 4 MCG: 100 INJECTION, SOLUTION, CONCENTRATE INTRAVENOUS at 12:52

## 2019-11-01 RX ADMIN — DEXMEDETOMIDINE HYDROCHLORIDE 8 MCG: 100 INJECTION, SOLUTION, CONCENTRATE INTRAVENOUS at 10:05

## 2019-11-01 RX ADMIN — OXYCODONE HYDROCHLORIDE AND ACETAMINOPHEN 1 TABLET: 7.5; 325 TABLET ORAL at 17:04

## 2019-11-01 RX ADMIN — KETAMINE HYDROCHLORIDE 20 MG: 10 INJECTION INTRAMUSCULAR; INTRAVENOUS at 09:37

## 2019-11-01 RX ADMIN — ROCURONIUM BROMIDE 20 MG: 10 INJECTION INTRAVENOUS at 12:03

## 2019-11-01 RX ADMIN — HYDROMORPHONE HYDROCHLORIDE 0.5 MG: 2 INJECTION INTRAMUSCULAR; INTRAVENOUS; SUBCUTANEOUS at 09:55

## 2019-11-01 RX ADMIN — CEFAZOLIN SODIUM 1 G: 1 INJECTION, SOLUTION INTRAVENOUS at 17:09

## 2019-11-01 RX ADMIN — FENTANYL CITRATE 50 MCG: 50 INJECTION INTRAMUSCULAR; INTRAVENOUS at 09:54

## 2019-11-01 RX ADMIN — SODIUM CHLORIDE, POTASSIUM CHLORIDE, SODIUM LACTATE AND CALCIUM CHLORIDE 9 ML/HR: 600; 310; 30; 20 INJECTION, SOLUTION INTRAVENOUS at 06:58

## 2019-11-01 RX ADMIN — KETAMINE HYDROCHLORIDE 10 MG: 10 INJECTION INTRAMUSCULAR; INTRAVENOUS at 11:52

## 2019-11-01 RX ADMIN — ROCURONIUM BROMIDE 20 MG: 10 INJECTION INTRAVENOUS at 11:00

## 2019-11-01 RX ADMIN — FAMOTIDINE 20 MG: 10 INJECTION INTRAVENOUS at 08:47

## 2019-11-01 RX ADMIN — SUCCINYLCHOLINE CHLORIDE 150 MG: 20 INJECTION, SOLUTION INTRAMUSCULAR; INTRAVENOUS; PARENTERAL at 09:22

## 2019-11-01 RX ADMIN — DEXMEDETOMIDINE HYDROCHLORIDE 4 MCG: 100 INJECTION, SOLUTION, CONCENTRATE INTRAVENOUS at 11:33

## 2019-11-01 RX ADMIN — ROCURONIUM BROMIDE 50 MG: 10 INJECTION INTRAVENOUS at 09:30

## 2019-11-01 RX ADMIN — AZELASTINE HYDROCHLORIDE 1 SPRAY: 137 SPRAY, METERED NASAL at 21:15

## 2019-11-01 RX ADMIN — KETAMINE HYDROCHLORIDE 10 MG: 10 INJECTION INTRAMUSCULAR; INTRAVENOUS at 10:52

## 2019-11-01 RX ADMIN — DEXMEDETOMIDINE HYDROCHLORIDE 4 MCG: 100 INJECTION, SOLUTION, CONCENTRATE INTRAVENOUS at 11:47

## 2019-11-01 RX ADMIN — ONDANSETRON 4 MG: 2 INJECTION INTRAMUSCULAR; INTRAVENOUS at 09:35

## 2019-11-01 RX ADMIN — PROMETHAZINE HYDROCHLORIDE 12.5 MG: 25 INJECTION INTRAMUSCULAR; INTRAVENOUS at 23:41

## 2019-11-01 RX ADMIN — FENTANYL CITRATE 50 MCG: 50 INJECTION INTRAMUSCULAR; INTRAVENOUS at 11:55

## 2019-11-01 RX ADMIN — GLYCOPYRROLATE 0.6 MG: 0.2 INJECTION INTRAMUSCULAR; INTRAVENOUS at 13:06

## 2019-11-01 RX ADMIN — SCOPOLAMINE 1 PATCH: 1 PATCH TRANSDERMAL at 07:34

## 2019-11-01 RX ADMIN — HYDROMORPHONE HYDROCHLORIDE 0.5 MG: 2 INJECTION INTRAMUSCULAR; INTRAVENOUS; SUBCUTANEOUS at 12:01

## 2019-11-01 RX ADMIN — DEXMEDETOMIDINE HYDROCHLORIDE 4 MCG: 100 INJECTION, SOLUTION, CONCENTRATE INTRAVENOUS at 12:03

## 2019-11-01 RX ADMIN — CEFAZOLIN SODIUM 2 G: 2 INJECTION, SOLUTION INTRAVENOUS at 09:24

## 2019-11-01 RX ADMIN — LIDOCAINE HYDROCHLORIDE 100 MG: 20 INJECTION, SOLUTION INFILTRATION; PERINEURAL at 09:22

## 2019-11-01 RX ADMIN — DEXMEDETOMIDINE HYDROCHLORIDE 4 MCG: 100 INJECTION, SOLUTION, CONCENTRATE INTRAVENOUS at 10:26

## 2019-11-01 RX ADMIN — DEXAMETHASONE SODIUM PHOSPHATE 8 MG: 10 INJECTION INTRAMUSCULAR; INTRAVENOUS at 09:35

## 2019-11-01 RX ADMIN — FENTANYL CITRATE 100 MCG: 50 INJECTION INTRAMUSCULAR; INTRAVENOUS at 09:22

## 2019-11-01 RX ADMIN — KETOTIFEN FUMARATE 1 DROP: 0.35 SOLUTION/ DROPS OPHTHALMIC at 21:16

## 2019-11-01 RX ADMIN — DEXMEDETOMIDINE HYDROCHLORIDE 8 MCG: 100 INJECTION, SOLUTION, CONCENTRATE INTRAVENOUS at 12:14

## 2019-11-01 RX ADMIN — DEXMEDETOMIDINE HYDROCHLORIDE 4 MCG: 100 INJECTION, SOLUTION, CONCENTRATE INTRAVENOUS at 11:11

## 2019-11-01 RX ADMIN — DEXMEDETOMIDINE HYDROCHLORIDE 4 MCG: 100 INJECTION, SOLUTION, CONCENTRATE INTRAVENOUS at 11:00

## 2019-11-01 RX ADMIN — NEOSTIGMINE METHYLSULFATE 3 MG: 1 INJECTION INTRAMUSCULAR; INTRAVENOUS; SUBCUTANEOUS at 13:06

## 2019-11-01 RX ADMIN — OXYCODONE HYDROCHLORIDE AND ACETAMINOPHEN 1 TABLET: 7.5; 325 TABLET ORAL at 21:15

## 2019-11-01 RX ADMIN — DEXMEDETOMIDINE HYDROCHLORIDE 4 MCG: 100 INJECTION, SOLUTION, CONCENTRATE INTRAVENOUS at 11:52

## 2019-11-01 RX ADMIN — MIDAZOLAM 2 MG: 1 INJECTION INTRAMUSCULAR; INTRAVENOUS at 09:07

## 2019-11-01 NOTE — ANESTHESIA PROCEDURE NOTES
Airway  Urgency: elective    Date/Time: 11/1/2019 9:25 AM  Airway not difficult    General Information and Staff    Patient location during procedure: OR  CRNA: Nahomi Galvan CRNA    Indications and Patient Condition  Indications for airway management: airway protection    Preoxygenated: yes  MILS maintained throughout  Mask difficulty assessment: 1 - vent by mask    Final Airway Details  Final airway type: endotracheal airway      Successful airway: ETT  Cuffed: yes   Successful intubation technique: direct laryngoscopy  Facilitating devices/methods: intubating stylet  Endotracheal tube insertion site: oral  Blade: Skyler  Blade size: 3  ETT size (mm): 7.0  Cormack-Lehane Classification: grade IIb - view of arytenoids or posterior of glottis only  Placement verified by: chest auscultation and capnometry   Measured from: lips  ETT/EBT  to lips (cm): 21  Number of attempts at approach: 1  Assessment: lips, teeth, and gum same as pre-op and atraumatic intubation    Additional Comments  Atraumatic insertion

## 2019-11-01 NOTE — ANESTHESIA POSTPROCEDURE EVALUATION
Patient: Octavia Pendleton    Procedure Summary     Date:  11/01/19 Room / Location:  [facility] OR 03 / [facility] MAIN OR    Anesthesia Start:  0913 Anesthesia Stop:  1333    Procedures:       ABDOMINOPLASTY (N/A Abdomen)      PANNICULECTOMY (N/A Abdomen) Diagnosis:      Surgeon:  Waterhouse, Maurine, MD Provider:  Agustin Man MD    Anesthesia Type:  general ASA Status:  4          Anesthesia Type: general  Last vitals  BP   113/76 (11/01/19 1435)   Temp   36.6 °C (97.9 °F) (11/01/19 1327)   Pulse   83 (11/01/19 1435)   Resp   14 (11/01/19 1435)     SpO2   91 % (11/01/19 1435)     Post Anesthesia Care and Evaluation    Patient location during evaluation: bedside  Patient participation: complete - patient participated  Level of consciousness: awake  Pain management: adequate  Airway patency: patent  Anesthetic complications: No anesthetic complications    Cardiovascular status: acceptable  Respiratory status: acceptable  Hydration status: acceptable

## 2019-11-01 NOTE — H&P
Patient Care Team:  Kehrer, Meredith Lea, MD as PCP - General  Kehrer, Meredith Lea, MD as PCP - Family Medicine    Chief complaintexcess abdominal skin and adipose tissue abdomen    Subjective     Patient is a 69 y.o. female presents with above      Review of Systems   Pertinent items are noted in HPI, all other systems reviewed and negative    History  Past Medical History:   Diagnosis Date   • Abnormal EEG    • Acute upper respiratory infection    • Allergic conjunctivitis    • Allergic rhinitis    • Anemia    • Anesthesia complication     HARD TO AWAKEN   • Arthritis    • Asthma    • Atopic dermatitis    • BMI 40.0-44.9, adult (CMS/[facility])    • Bronchitis    • Cataract    • Cervical neuritis    • Cervical stenosis of spine    • Cervicalgia    • Chronic obstructive asthma with acute exacerbation (CMS/[facility])    • DDD (degenerative disc disease), lumbar    • Depression    • Disease of thyroid gland    • Dysphagia     RESOLVED AFTER SURG   • Erosive esophagitis    • Facet arthritis of cervical region    • Generalized osteoarthritis of multiple sites    • GERD (gastroesophageal reflux disease)    • Hemangioma    • History of esophageal stricture    • Hypercholesteremia    • Hyperlipidemia    • Hypertension    • Hypokalemia    • Hypothyroidism    • Influenza A with respiratory manifestations    • Internal hemorrhoids    • Intestinal malabsorption    • Mixed hyperlipidemia    • Need for prophylactic vaccination against Streptococcus pneumoniae (pneumococcus) and influenza    • Neoplasm of uncertain behavior of skin    • JAY on CPAP     NEW SLEEP TESTDONE SEPT, TRIAL OF NOT WEARING CPAP   • Osteopenia of lumbar spine    • Osteopenia of spine    • Osteoporosis    • Pain in arm    • Panniculitis    • PONV (postoperative nausea and vomiting)    • Screening for malignant neoplasm of breast    • Screening for malignant neoplasm of cervix    • Thiamine deficiency    • TIA (transient ischemic attack)    • Vitamin B deficiency     • Vitamin B12 deficiency    • Vitamin D deficiency    • Wears glasses      Past Surgical History:   Procedure Laterality Date   • CARDIAC CATHETERIZATION  10/2009    Abnormal blood vessels   • CARPAL TUNNEL RELEASE Left    • CERVICAL DISC SURGERY  04/21/2016    bones spurs removed as well   • CERVICAL SPINE SURGERY  04/20/2017   • CHOLECYSTECTOMY     • DIAGNOSTIC LAPAROSCOPY     • ELBOW PROCEDURE Left     release of nerve similar to carpal tunnel   • EYE SURGERY Left 2000    CYST, CATARACT   • GASTRIC BYPASS  10/2010   • HAND SURGERY Right    • HEMORRHOIDECTOMY     • HYSTERECTOMY     • INGUINAL HERNIA REPAIR Bilateral    • REPLACEMENT TOTAL KNEE BILATERAL     • RIB FRACTURE SURGERY      1ST RIB REMOVED, DUE NUMBNESS IN ARM   • ROTATOR CUFF REPAIR Bilateral      No medications prior to admission.     Allergies:  Codeine    Objective     Vital Signs       Physical Exam:      General Appearance:    Alert, cooperative, in no acute distress, moderately overweight   Head:    Normocephalic, without obvious abnormality, atraumatic   Eyes:            Lids and lashes normal, conjunctivae and sclerae normal, no   icterus, no pallor, corneas clear, PERRLA   Ears:    Ears appear intact with no abnormalities noted   Throat:   No oral lesions, no thrush, oral mucosa moist   Neck:   No adenopathy, supple, trachea midline, no thyromegaly, no     carotid bruit, no JVD   Back:     No kyphosis present, no scoliosis present, no skin lesions,       erythema or scars, no tenderness to percussion or                   palpation,   range of motion normal   Lungs:     Clear to auscultation,respirations regular, even and                   unlabored    Heart:    Regular rhythm and normal rate, normal S1 and S2, no            murmur, no gallop, no rub, no click   Breast Exam:    Deferred   Abdomen:     Normal bowel sounds, no masses, no organomegaly, soft        non-tender, non-distended, no guarding, no rebound                 Tenderness, ruq  scar, excess adipose tissue with hanging skin laxity   Genitalia:    Deferred   Extremities:   Moves all extremities well, no edema, no cyanosis, no              redness   Pulses:   Pulses palpable and equal bilaterally   Skin:   No bleeding, bruising or rash   Lymph nodes:   No palpable adenopathy   Neurologic:   Cranial nerves 2 - 12 grossly intact, sensation intact, DTR        present and equal bilaterally              Assessment/Plan       * No active hospital problems. *      Excess skin and adipose tissue abdomen- abdominoplasty with panniculectomy    I discussed the patients findings and my recommendations with patient.     Maurine Waterhouse, MD  10/31/19  11:04 PM

## 2019-11-01 NOTE — ANESTHESIA PREPROCEDURE EVALUATION
Anesthesia Evaluation     Patient summary reviewed and Nursing notes reviewed   history of anesthetic complications: PONV               Airway   Mallampati: III  No difficulty expected  Dental      Pulmonary    (+) COPD, asthma,recent URI, sleep apnea on CPAP,   Cardiovascular     ECG reviewed  Rhythm: regular  Rate: normal    (+) hypertension, hyperlipidemia,       Neuro/Psych  (+) TIA, numbness, psychiatric history Anxiety,     GI/Hepatic/Renal/Endo    (+) morbid obesity, GERD, PUD,      Musculoskeletal     (+) neck pain,   Abdominal    Substance History - negative use     OB/GYN negative ob/gyn ROS         Other   arthritis,                    Anesthesia Plan    ASA 4     general     intravenous induction   Anesthetic plan, all risks, benefits, and alternatives have been provided, discussed and informed consent has been obtained with: spouse/significant other and patient.

## 2019-11-01 NOTE — PLAN OF CARE
Problem: Patient Care Overview  Goal: Plan of Care Review  Outcome: Ongoing (interventions implemented as appropriate)   11/01/19 5919   Coping/Psychosocial   Plan of Care Reviewed With patient   Plan of Care Review   Progress improving   OTHER   Outcome Summary VSS. Pain well controlled with PO med. Turner reg diet. Dsg CDI. Using IS well. Rested well.      Goal: Individualization and Mutuality  Outcome: Ongoing (interventions implemented as appropriate)    Goal: Discharge Needs Assessment  Outcome: Ongoing (interventions implemented as appropriate)    Goal: Interprofessional Rounds/Family Conf  Outcome: Ongoing (interventions implemented as appropriate)      Problem: Pain, Acute (Adult)  Goal: Identify Related Risk Factors and Signs and Symptoms  Outcome: Ongoing (interventions implemented as appropriate)    Goal: Acceptable Pain Control/Comfort Level  Outcome: Ongoing (interventions implemented as appropriate)

## 2019-11-01 NOTE — OP NOTE
PREOPERATIVE DIAGNOSIS: Excess adipose and skin, abdominal wall.    POSTOPERATIVE DIAGNOSIS: Excess adipose and skin, abdominal wall.    PROCEDURES PERFORMED: Abdominoplasty with insurance-covered panniculectomy.    SURGEON: Maurine Waterhouse, MD    ASSISTANT: Sera Mendosa    ANESTHESIA: General endotracheal anesthesia. supplemented with local injection using 0.25% Marcaine with epinephrine and dilute Marcaine with epinephrine to suction sites at the lateral hips and pubis.    INDICATIONS: The patient is a 69-year-old female who has requested abdominal wall contouring due to excess adipose and skin with problems caused by the overhanging panniculus resulting in rashes and skin breakdown.    DESCRIPTION OF PROCEDURE: The patient was marked preoperatively in a standing position to airam the abdominal wall midline and pubic area. The planned lower transverse skin incision was marked beneath her panniculus fold with the marking underneath a lower transverse scar. Areas for suctioning on the hip and lateral waist areas were marked.    Patient was brought to the operating room where she was placed under general endotracheal anesthesia. The abdomen was prepped and draped out. The lower transverse skin incision was injected with local anesthetic using 0.25% Marcaine with epinephrine. The skin was then incised along this lower transverse incision with cautery and the Argon Bovie then used to elevate the skin and adipose tissue up to the level of umbilicus. The skin around the umbilicus was incised but leaving this attached. The skin appeared stretched out; however, no hernia was palpated. Further dissection was then carried out in the midline up to near the costal margin on the left, and keeping the right upper quadrant area attached due to a very old right upper quadrant cholecystectomy scar. Dissection was carried up to the level of the xiphoid. Bleeding points were cauterized. Rigid vessels were identified along the  right upper quadrant perfusing the right-sided abdomen skin wall.    The fascia was the plicated in the midline, marking a vertical plication from the xiphoid down to the pubic area. This was plicated with double layer of 0 Prolene, suturing from xiphoid down to the upper umbilical edge and then from the lower umbilical edge down to the pubic area and back. These Prolene sutures were buried with Vicryl suture. Patient was placed in a moderately flexed and the excess adipose and skin marked approximately 1 inch above the previous umbilical location extending out to the lateral incision edges. The marked incision was injected with local anesthetic. This was incised and divided with cautery to remove a large ellipse of excess adipose and skin. The defect was then inspected and irrigated with bacitracin irrigation. Local anesthetic was injected into the abdominal wall plication as well as TAP blocks placed with the Marcaine. In a flexed position, the abdominal wall was sutured, placing 2-0 Vicryl mattress sutures from Mario fascia down to the underlying fascia beginning superiorly and laterally and moving medially and inferiorly. The position of the umbilicus was marked in the midline prior to placement of the lower sutures. Dilute Marcaine with epinephrine was injected into each hip and waist area placing 40 mL on each side and 20 mL into the pubic area. These areas were then suctioned removing 200 mL of adipose tissue from each hip and waist area and 25 mL of adipose from pubic area. The transverse incision was then closed over a #15-Canadian drain brought out through the small incision in the pubic area. The Mario fascia layer transverse incision was closed with interrupted 2-0 Vicryl sutures beginning medially and moving laterally. Small dog-ears were excised at either end. Dermis was approximated with a running 3-0 Vicryl dermal stitch followed by skin closure with a running 4-0 Monocryl subcuticular skin  stitch.    The bellybutton was then brought out at the marked position, making a small transverse skin incision and then resecting underlying fat. The abdominal wall dermis was closed to the umbilical skin dermis and to the underlying fascia at 4 points with 3-0 Vicryl suture. Interrupted 4-0 Monocryl dermal stitches were placed between these sutures followed by a running 5-0 Monocryl subcuticular skin stitch. Benzoin and Steri-Strips were placed across the lower transverse skin incision. Sterile dressing was placed over the drain with a Tegaderm. Sterile gauze was then taped over the incisions and bellybutton.    The total weight excised was 3596 g. Estimated blood loss was approximately 75 mL. Patient tolerated the procedure well and was discharged to recovery room in stable condition.

## 2019-11-02 VITALS
RESPIRATION RATE: 16 BRPM | BODY MASS INDEX: 40.22 KG/M2 | OXYGEN SATURATION: 92 % | HEART RATE: 84 BPM | SYSTOLIC BLOOD PRESSURE: 138 MMHG | WEIGHT: 235.56 LBS | HEIGHT: 64 IN | TEMPERATURE: 97.5 F | DIASTOLIC BLOOD PRESSURE: 62 MMHG

## 2019-11-02 PROCEDURE — A9270 NON-COVERED ITEM OR SERVICE: HCPCS | Performed by: PLASTIC SURGERY

## 2019-11-02 PROCEDURE — 63710000001 POTASSIUM CHLORIDE 20 MEQ PACK: Performed by: PLASTIC SURGERY

## 2019-11-02 PROCEDURE — 63710000001 PANTOPRAZOLE 40 MG TABLET DELAYED-RELEASE: Performed by: PLASTIC SURGERY

## 2019-11-02 PROCEDURE — 63710000001 HYDROCHLOROTHIAZIDE 12.5 MG CAPSULE: Performed by: PLASTIC SURGERY

## 2019-11-02 PROCEDURE — 63710000001 ESCITALOPRAM 20 MG TABLET: Performed by: PLASTIC SURGERY

## 2019-11-02 PROCEDURE — 25010000003 CEFAZOLIN 1-4 GM/50ML-% SOLUTION: Performed by: PLASTIC SURGERY

## 2019-11-02 PROCEDURE — 63710000001 ATORVASTATIN 20 MG TABLET: Performed by: PLASTIC SURGERY

## 2019-11-02 PROCEDURE — 63710000001 FLUTICASONE 50 MCG/ACT SUSPENSION 16 G BOTTLE: Performed by: PLASTIC SURGERY

## 2019-11-02 PROCEDURE — 63710000001 OXYCODONE-ACETAMINOPHEN 7.5-325 MG TABLET: Performed by: PLASTIC SURGERY

## 2019-11-02 PROCEDURE — 63710000001 LEVOTHYROXINE 50 MCG TABLET: Performed by: PLASTIC SURGERY

## 2019-11-02 PROCEDURE — 63710000001 CELECOXIB 200 MG CAPSULE: Performed by: PLASTIC SURGERY

## 2019-11-02 RX ORDER — OXYCODONE AND ACETAMINOPHEN 7.5; 325 MG/1; MG/1
1 TABLET ORAL EVERY 4 HOURS PRN
Qty: 48 TABLET | Refills: 0 | Status: SHIPPED | OUTPATIENT
Start: 2019-11-02 | End: 2019-11-06

## 2019-11-02 RX ADMIN — OXYCODONE HYDROCHLORIDE AND ACETAMINOPHEN 1 TABLET: 7.5; 325 TABLET ORAL at 01:50

## 2019-11-02 RX ADMIN — LEVOTHYROXINE SODIUM 50 MCG: 50 TABLET ORAL at 06:39

## 2019-11-02 RX ADMIN — KETOTIFEN FUMARATE 1 DROP: 0.35 SOLUTION/ DROPS OPHTHALMIC at 08:49

## 2019-11-02 RX ADMIN — CEFAZOLIN SODIUM 1 G: 1 INJECTION, SOLUTION INTRAVENOUS at 01:46

## 2019-11-02 RX ADMIN — CEFAZOLIN SODIUM 1 G: 1 INJECTION, SOLUTION INTRAVENOUS at 08:48

## 2019-11-02 RX ADMIN — HYDROCHLOROTHIAZIDE 12.5 MG: 12.5 CAPSULE ORAL at 08:48

## 2019-11-02 RX ADMIN — FLUTICASONE PROPIONATE 2 SPRAY: 50 SPRAY, METERED NASAL at 08:49

## 2019-11-02 RX ADMIN — ESCITALOPRAM OXALATE 20 MG: 20 TABLET, FILM COATED ORAL at 08:48

## 2019-11-02 RX ADMIN — POTASSIUM CHLORIDE 20 MEQ: 1.5 POWDER, FOR SOLUTION ORAL at 08:49

## 2019-11-02 RX ADMIN — OXYCODONE HYDROCHLORIDE AND ACETAMINOPHEN 1 TABLET: 7.5; 325 TABLET ORAL at 11:22

## 2019-11-02 RX ADMIN — OXYCODONE HYDROCHLORIDE AND ACETAMINOPHEN 1 TABLET: 7.5; 325 TABLET ORAL at 06:39

## 2019-11-02 RX ADMIN — AZELASTINE HYDROCHLORIDE 1 SPRAY: 137 SPRAY, METERED NASAL at 08:49

## 2019-11-02 RX ADMIN — SODIUM CHLORIDE, POTASSIUM CHLORIDE, SODIUM LACTATE AND CALCIUM CHLORIDE 100 ML/HR: 600; 310; 30; 20 INJECTION, SOLUTION INTRAVENOUS at 06:54

## 2019-11-02 RX ADMIN — CELECOXIB 200 MG: 200 CAPSULE ORAL at 08:48

## 2019-11-02 RX ADMIN — ATORVASTATIN CALCIUM 20 MG: 20 TABLET, FILM COATED ORAL at 08:48

## 2019-11-02 RX ADMIN — PANTOPRAZOLE SODIUM 40 MG: 40 TABLET, DELAYED RELEASE ORAL at 06:40

## 2019-11-02 NOTE — PLAN OF CARE
Problem: Patient Care Overview  Goal: Plan of Care Review  Outcome: Ongoing (interventions implemented as appropriate)   11/02/19 0532   Coping/Psychosocial   Plan of Care Reviewed With patient   Plan of Care Review   Progress improving   OTHER   Outcome Summary Dsg dry and intact to low abdomen. Small amt of bruising on sides. JONO x1 with small amt of serosanginous drainage. Afebrile and VS stable. Flexed position. F/C will be removed this am. Pt ambulated in halls with assist this evening. Medicated with Percocet for pain with good pain control     Goal: Individualization and Mutuality  Outcome: Ongoing (interventions implemented as appropriate)    Goal: Discharge Needs Assessment  Outcome: Ongoing (interventions implemented as appropriate)    Goal: Interprofessional Rounds/Family Conf  Outcome: Ongoing (interventions implemented as appropriate)      Problem: Pain, Acute (Adult)  Goal: Identify Related Risk Factors and Signs and Symptoms  Outcome: Ongoing (interventions implemented as appropriate)    Goal: Acceptable Pain Control/Comfort Level  Outcome: Ongoing (interventions implemented as appropriate)      Problem: Pain, Chronic (Adult)  Goal: Identify Related Risk Factors and Signs and Symptoms  Outcome: Ongoing (interventions implemented as appropriate)    Goal: Acceptable Pain/Comfort Level and Functional Ability  Outcome: Ongoing (interventions implemented as appropriate)      Problem: Fall Risk (Adult)  Goal: Identify Related Risk Factors and Signs and Symptoms  Outcome: Outcome(s) achieved Date Met: 11/02/19    Goal: Absence of Fall  Outcome: Ongoing (interventions implemented as appropriate)

## 2019-11-06 ENCOUNTER — OFFICE VISIT (OUTPATIENT)
Dept: FAMILY MEDICINE CLINIC | Facility: CLINIC | Age: 69
End: 2019-11-06

## 2019-11-06 VITALS
HEIGHT: 64 IN | HEART RATE: 82 BPM | BODY MASS INDEX: 38.31 KG/M2 | TEMPERATURE: 98.1 F | DIASTOLIC BLOOD PRESSURE: 70 MMHG | WEIGHT: 224.4 LBS | OXYGEN SATURATION: 96 % | SYSTOLIC BLOOD PRESSURE: 118 MMHG

## 2019-11-06 DIAGNOSIS — E55.9 VITAMIN D DEFICIENCY: ICD-10-CM

## 2019-11-06 DIAGNOSIS — Z98.84 S/P GASTRIC BYPASS: ICD-10-CM

## 2019-11-06 DIAGNOSIS — Z98.890 S/P ABDOMINOPLASTY: ICD-10-CM

## 2019-11-06 DIAGNOSIS — E03.9 HYPOTHYROIDISM, UNSPECIFIED TYPE: ICD-10-CM

## 2019-11-06 DIAGNOSIS — E78.5 HYPERLIPIDEMIA, UNSPECIFIED HYPERLIPIDEMIA TYPE: ICD-10-CM

## 2019-11-06 DIAGNOSIS — E53.9 VITAMIN B DEFICIENCY: ICD-10-CM

## 2019-11-06 DIAGNOSIS — E61.1 IRON DEFICIENCY: ICD-10-CM

## 2019-11-06 DIAGNOSIS — I10 ESSENTIAL HYPERTENSION: Primary | ICD-10-CM

## 2019-11-06 PROBLEM — E51.9 THIAMINE DEFICIENCY: Status: ACTIVE | Noted: 2019-11-06

## 2019-11-06 PROBLEM — E53.8 VITAMIN B12 DEFICIENCY: Status: ACTIVE | Noted: 2019-11-06

## 2019-11-06 PROCEDURE — 99214 OFFICE O/P EST MOD 30 MIN: CPT | Performed by: FAMILY MEDICINE

## 2019-11-06 RX ORDER — CYANOCOBALAMIN 1000 UG/ML
INJECTION, SOLUTION INTRAMUSCULAR; SUBCUTANEOUS
COMMUNITY
Start: 2019-10-29 | End: 2019-11-06 | Stop reason: SDUPTHER

## 2019-11-06 NOTE — PROGRESS NOTES
Subjective   Octavia Pendleton is a 69 y.o. female.     Chief Complaint   Patient presents with   • Hypertension   • Hyperlipidemia   • Hypothyroidism        Patient here for routine follow-up of hypertension and dyslipidemia and vitamin deficiency status post gastric bypass.  She is 5 days postop abdominoplasty and panniculectomy and is doing very well.  Her pain is well controlled.  She still has a drain in place and has no complaints.  Her blood pressure was low when she was in the hospital so Dr. Waterhouse recommended that she stop her hydrochlorothiazide.  Her blood pressure is still been doing well since then.  She is fasting this morning.           The following portions of the patient's history were reviewed and updated as appropriate: allergies, current medications, past family history, past medical history, past social history, past surgical history and problem list.    Past Medical History:   Diagnosis Date   • Abnormal EEG    • Acute upper respiratory infection    • Allergic conjunctivitis    • Allergic rhinitis    • Anemia    • Anesthesia complication     HARD TO AWAKEN   • Arthritis    • Asthma    • Atopic dermatitis    • BMI 40.0-44.9, adult (CMS/[facility])    • Bronchitis    • Cataract    • Cervical neuritis    • Cervical stenosis of spine    • Cervicalgia    • Chronic obstructive asthma with acute exacerbation (CMS/[facility])    • DDD (degenerative disc disease), lumbar    • Depression    • Disease of thyroid gland    • Dysphagia     RESOLVED AFTER SURG   • Erosive esophagitis    • Facet arthritis of cervical region    • Generalized osteoarthritis of multiple sites    • GERD (gastroesophageal reflux disease)    • Hemangioma    • History of esophageal stricture    • Hypercholesteremia    • Hyperlipidemia    • Hypertension    • Hypokalemia    • Hypothyroidism    • Influenza A with respiratory manifestations    • Internal hemorrhoids    • Intestinal malabsorption    • Mixed hyperlipidemia    • Need for  prophylactic vaccination against Streptococcus pneumoniae (pneumococcus) and influenza    • Neoplasm of uncertain behavior of skin    • JAY on CPAP     NEW SLEEP TESTDONE SEPT, TRIAL OF NOT WEARING CPAP   • Osteopenia of lumbar spine    • Osteopenia of spine    • Osteoporosis    • Pain in arm    • Panniculitis    • PONV (postoperative nausea and vomiting)    • Screening for malignant neoplasm of breast    • Screening for malignant neoplasm of cervix    • Thiamine deficiency    • TIA (transient ischemic attack)    • Vitamin B deficiency    • Vitamin B12 deficiency    • Vitamin D deficiency    • Wears glasses        Past Surgical History:   Procedure Laterality Date   • ABDOMINOPLASTY N/A 11/1/2019    Procedure: ABDOMINOPLASTY;  Surgeon: Waterhouse, Maurine, MD;  Location: [facility];  Service: Plastics   • CARDIAC CATHETERIZATION  10/2009    Abnormal blood vessels   • CARPAL TUNNEL RELEASE Left    • CERVICAL DISC SURGERY  04/21/2016    bones spurs removed as well   • CERVICAL SPINE SURGERY  04/20/2017   • CHOLECYSTECTOMY     • DIAGNOSTIC LAPAROSCOPY     • ELBOW PROCEDURE Left     release of nerve similar to carpal tunnel   • EYE SURGERY Left 2000    CYST, CATARACT   • GASTRIC BYPASS  10/2010   • HAND SURGERY Right    • HEMORRHOIDECTOMY     • HYSTERECTOMY     • INGUINAL HERNIA REPAIR Bilateral    • PANNICULECTOMY N/A 11/1/2019    Procedure: PANNICULECTOMY;  Surgeon: Waterhouse, Maurine, MD;  Location: [facility] OR;  Service: Plastics   • REPLACEMENT TOTAL KNEE BILATERAL     • RIB FRACTURE SURGERY      1ST RIB REMOVED, DUE NUMBNESS IN ARM   • ROTATOR CUFF REPAIR Bilateral        Family History   Problem Relation Age of Onset   • Vasculitis Father         Cerebral   • Coronary artery disease Father    • Hypertension Sister    • Diabetes Other    • Malig Hyperthermia Neg Hx        Social History     Socioeconomic History   • Marital status:      Spouse name: Not on file   • Number of children: Not on file    • Years of education: Not on file   • Highest education level: Not on file   Tobacco Use   • Smoking status: Never Smoker   • Smokeless tobacco: Never Used   Substance and Sexual Activity   • Alcohol use: Yes     Comment: rare use   • Drug use: No   • Sexual activity: Defer         Current Outpatient Medications:   •  albuterol (PROVENTIL HFA;VENTOLIN HFA) 108 (90 BASE) MCG/ACT inhaler, Inhale 2 puffs every 4 (four) hours as needed for wheezing., Disp: , Rfl:   •  atorvastatin (LIPITOR) 20 MG tablet, Take 1 tablet by mouth Daily., Disp: 90 tablet, Rfl: 0  •  azelastine (ASTELIN) 0.1 % nasal spray, 1 spray into each nostril 2 (two) times a day. Use in each nostril as directed, Disp: , Rfl:   •  azelastine (OPTIVAR) 0.05 % ophthalmic solution, Administer 1 drop to both eyes 2 (Two) Times a Day., Disp: 6 each, Rfl: 2  •  celecoxib (CeleBREX) 200 MG capsule, Take 200 mg by mouth daily., Disp: , Rfl:   •  Cyanocobalamin 1000 MCG/ML kit, Inject 1 mL as directed Every 30 (Thirty) Days., Disp: 1 each, Rfl: 3  •  escitalopram (LEXAPRO) 20 MG tablet, Take 1 tablet by mouth Daily., Disp: 90 tablet, Rfl: 0  •  esomeprazole (nexIUM) 40 MG capsule, Take 1 capsule by mouth 2 (Two) Times a Day., Disp: 180 capsule, Rfl: 0  •  ferrous sulfate 325 (65 FE) MG tablet, Take 325 mg by mouth daily with breakfast., Disp: , Rfl:   •  fexofenadine (ALLEGRA) 180 MG tablet, Take 180 mg by mouth daily., Disp: , Rfl:   •  fluticasone (FLONASE) 50 MCG/ACT nasal spray, 2 sprays into each nostril daily. Administer 2 sprays in each nostril for each dose., Disp: , Rfl:   •  Fluticasone Furoate-Vilanterol (BREO ELLIPTA IN), Inhale 2 puffs Daily., Disp: , Rfl:   •  levalbuterol (XOPENEX) 0.63 MG/3ML nebulizer solution, Take 1 ampule by nebulization every 4 (four) hours as needed for wheezing., Disp: , Rfl:   •  levothyroxine (SYNTHROID, LEVOTHROID) 50 MCG tablet, Take 1 tablet by mouth Daily., Disp: 90 tablet, Rfl: 0  •  omalizumab (XOLAIR) 150 MG  "injection, Inject 150 mg under the skin every 30 (thirty) days., Disp: , Rfl:   •  potassium chloride (K-DUR,KLOR-CON) 20 MEQ CR tablet, Take 1 tablet by mouth 2 (Two) Times a Day., Disp: 180 tablet, Rfl: 0  •  Probiotic Product (FLORAJEN3) capsule, Take 1 tablet by mouth daily., Disp: , Rfl:   •  thiamine (B-1) 100 MG/ML injection, Inject 2 mL into the appropriate muscle as directed by prescriber Every 30 (Thirty) Days. (Patient taking differently: Inject 200 mg into the appropriate muscle as directed by prescriber Every 3 (Three) Months.), Disp: 2 mL, Rfl: 2    Current Facility-Administered Medications:   •  thiamine (B-1) injection 200 mg, 200 mg, Intramuscular, Daily, Kehrer, Meredith Lea, MD, 200 mg at 08/12/19 1048    Review of Systems   Constitutional: Negative for chills, fatigue and fever.   HENT: Negative for congestion, rhinorrhea and sore throat.    Respiratory: Negative for cough and shortness of breath.    Cardiovascular: Negative for chest pain and leg swelling.   Gastrointestinal: Negative for abdominal pain.   Endocrine: Negative for polydipsia and polyuria.   Genitourinary: Negative for dysuria.   Musculoskeletal: Negative for arthralgias and myalgias.   Skin: Negative for rash.   Neurological: Negative for dizziness.   Hematological: Does not bruise/bleed easily.   Psychiatric/Behavioral: Negative for sleep disturbance.       Objective   Vitals:    11/06/19 0903   BP: 118/70   Pulse: 82   Temp: 98.1 °F (36.7 °C)   SpO2: 96%   Weight: 102 kg (224 lb 6.4 oz)   Height: 162.6 cm (64\")     Body mass index is 38.52 kg/m².  Physical Exam   Constitutional: She is oriented to person, place, and time. She appears well-developed and well-nourished.   HENT:   Head: Normocephalic and atraumatic.   Eyes: Conjunctivae and EOM are normal. Pupils are equal, round, and reactive to light.   Neck: Neck supple. No thyromegaly present.   Cardiovascular: Normal rate and regular rhythm.   No murmur " heard.  Pulmonary/Chest: Effort normal and breath sounds normal. She has no wheezes.   Abdominal: Soft.   Musculoskeletal: Normal range of motion.   Neurological: She is alert and oriented to person, place, and time. No cranial nerve deficit.   Skin: Skin is warm and dry.   Abdomnoplasty incision intact with drain in place.  Minimal serosanguineous drainage in the center.   Psychiatric: She has a normal mood and affect.         Assessment/Plan   Octavia was seen today for hypertension, hyperlipidemia and hypothyroidism.    Diagnoses and all orders for this visit:    Essential hypertension  -     CBC & Differential  -     Comprehensive Metabolic Panel    Hyperlipidemia, unspecified hyperlipidemia type  -     Lipid Panel    Hypothyroidism, unspecified type  -     TSH    Vitamin B deficiency  -     Vitamin B12  -     Vitamin B1, Whole Blood    Vitamin D deficiency  -     Vitamin D 25 hydroxy    Iron deficiency  -     Iron and TIBC    S/P gastric bypass    S/P abdominoplasty               Patient Instructions   At one month follow up, will see about staying off medication for blood pressure.

## 2019-11-07 ENCOUNTER — HOSPITAL ENCOUNTER (OUTPATIENT)
Dept: INFUSION THERAPY | Facility: HOSPITAL | Age: 69
Discharge: HOME | End: 2019-11-07
Admitting: ALLERGY & IMMUNOLOGY

## 2019-11-07 VITALS
OXYGEN SATURATION: 95 % | SYSTOLIC BLOOD PRESSURE: 111 MMHG | DIASTOLIC BLOOD PRESSURE: 61 MMHG | HEART RATE: 69 BPM | RESPIRATION RATE: 16 BRPM | TEMPERATURE: 97.4 F

## 2019-11-07 DIAGNOSIS — J45.40 MODERATE PERSISTENT ASTHMA, UNSPECIFIED WHETHER COMPLICATED: Primary | ICD-10-CM

## 2019-11-07 PROCEDURE — 96372 THER/PROPH/DIAG INJ SC/IM: CPT

## 2019-11-07 PROCEDURE — 25010000002 OMALIZUMAB PER 5 MG: Performed by: ALLERGY & IMMUNOLOGY

## 2019-11-07 RX ADMIN — OMALIZUMAB 150 MG: 202.5 INJECTION, SOLUTION SUBCUTANEOUS at 10:15

## 2019-11-07 NOTE — PROGRESS NOTES
Tolerated injection well.  Vital signs stable.  Monitored for 30 minutes post injection per MD order without incident.  Pt DC per ambulation @ 1050.

## 2019-11-09 LAB
25(OH)D3+25(OH)D2 SERPL-MCNC: 53.8 NG/ML (ref 30–100)
ALBUMIN SERPL-MCNC: 4 G/DL (ref 3.5–5.2)
ALBUMIN/GLOB SERPL: 1.8 G/DL
ALP SERPL-CCNC: 82 U/L (ref 39–117)
ALT SERPL-CCNC: 10 U/L (ref 1–33)
AST SERPL-CCNC: 14 U/L (ref 1–32)
BASOPHILS # BLD AUTO: 0.07 10*3/MM3 (ref 0–0.2)
BASOPHILS NFR BLD AUTO: 0.7 % (ref 0–1.5)
BILIRUB SERPL-MCNC: 0.7 MG/DL (ref 0.2–1.2)
BUN SERPL-MCNC: 11 MG/DL (ref 8–23)
BUN/CREAT SERPL: 13.1 (ref 7–25)
CALCIUM SERPL-MCNC: 8.9 MG/DL (ref 8.6–10.5)
CHLORIDE SERPL-SCNC: 103 MMOL/L (ref 98–107)
CHOLEST SERPL-MCNC: 143 MG/DL (ref 0–200)
CO2 SERPL-SCNC: 27.3 MMOL/L (ref 22–29)
CREAT SERPL-MCNC: 0.84 MG/DL (ref 0.57–1)
EOSINOPHIL # BLD AUTO: 0.4 10*3/MM3 (ref 0–0.4)
EOSINOPHIL NFR BLD AUTO: 4.3 % (ref 0.3–6.2)
ERYTHROCYTE [DISTWIDTH] IN BLOOD BY AUTOMATED COUNT: 13 % (ref 12.3–15.4)
GLOBULIN SER CALC-MCNC: 2.2 GM/DL
GLUCOSE SERPL-MCNC: 103 MG/DL (ref 65–99)
HCT VFR BLD AUTO: 39.1 % (ref 34–46.6)
HDLC SERPL-MCNC: 56 MG/DL (ref 40–60)
HGB BLD-MCNC: 13.1 G/DL (ref 12–15.9)
IMM GRANULOCYTES # BLD AUTO: 0.03 10*3/MM3 (ref 0–0.05)
IMM GRANULOCYTES NFR BLD AUTO: 0.3 % (ref 0–0.5)
IRON SATN MFR SERPL: 17 % (ref 20–50)
IRON SERPL-MCNC: 53 MCG/DL (ref 37–145)
LDLC SERPL CALC-MCNC: 63 MG/DL (ref 0–100)
LYMPHOCYTES # BLD AUTO: 2.32 10*3/MM3 (ref 0.7–3.1)
LYMPHOCYTES NFR BLD AUTO: 24.7 % (ref 19.6–45.3)
MCH RBC QN AUTO: 30 PG (ref 26.6–33)
MCHC RBC AUTO-ENTMCNC: 33.5 G/DL (ref 31.5–35.7)
MCV RBC AUTO: 89.5 FL (ref 79–97)
MONOCYTES # BLD AUTO: 1.11 10*3/MM3 (ref 0.1–0.9)
MONOCYTES NFR BLD AUTO: 11.8 % (ref 5–12)
NEUTROPHILS # BLD AUTO: 5.47 10*3/MM3 (ref 1.7–7)
NEUTROPHILS NFR BLD AUTO: 58.2 % (ref 42.7–76)
NRBC BLD AUTO-RTO: 0 /100 WBC (ref 0–0.2)
PLATELET # BLD AUTO: 315 10*3/MM3 (ref 140–450)
POTASSIUM SERPL-SCNC: 4.6 MMOL/L (ref 3.5–5.2)
PROT SERPL-MCNC: 6.2 G/DL (ref 6–8.5)
RBC # BLD AUTO: 4.37 10*6/MM3 (ref 3.77–5.28)
SODIUM SERPL-SCNC: 143 MMOL/L (ref 136–145)
TIBC SERPL-MCNC: 305 MCG/DL
TRIGL SERPL-MCNC: 121 MG/DL (ref 0–150)
TSH SERPL DL<=0.005 MIU/L-ACNC: 0.85 UIU/ML (ref 0.27–4.2)
UIBC SERPL-MCNC: 252 MCG/DL (ref 112–346)
VIT B1 BLD-SCNC: 158.9 NMOL/L (ref 66.5–200)
VIT B12 SERPL-MCNC: 1101 PG/ML (ref 211–946)
VLDLC SERPL CALC-MCNC: 24.2 MG/DL
WBC # BLD AUTO: 9.4 10*3/MM3 (ref 3.4–10.8)

## 2019-11-12 NOTE — DISCHARGE SUMMARY
ADMITTING DIAGNOSES: Surgical abdominoplasty and panniculectomy.    DISCHARGE DIAGNOSES: Surgical abdominoplasty and panniculectomy.    HOSPITAL COURSE: The patient was admitted through same day surgery and taken to the operating room where she underwent panniculectomy and abdominoplasty. She was admitted to the hospital for postoperative monitoring and pain control. She was able to convert to oral pain medicines on the evening following her surgery and was able to ambulate. On postoperative day 1, her Nazario catheter was removed and she was able to void without problems. Her diet was advanced and she was converted to all oral medications. On postoperative day 1, she was in stable condition and ready for discharge. She was given instructions to empty and record the Naif-Little drain. She may shower over the dressings on the drain site. She was given a prescription for Norco 7.5 mg 1 tablet oral q.4 h p.r.n. Pain. She may resume all preoperative medications with the exception of aspirin. She was given a follow up appointment in my office for 1 week.

## 2019-12-02 ENCOUNTER — CLINICAL SUPPORT (OUTPATIENT)
Dept: FAMILY MEDICINE CLINIC | Facility: CLINIC | Age: 69
End: 2019-12-02

## 2019-12-02 DIAGNOSIS — E53.9 VITAMIN B DEFICIENCY: Primary | ICD-10-CM

## 2019-12-02 PROCEDURE — 96372 THER/PROPH/DIAG INJ SC/IM: CPT | Performed by: FAMILY MEDICINE

## 2019-12-02 RX ORDER — THIAMINE HYDROCHLORIDE 100 MG/ML
200 INJECTION, SOLUTION INTRAMUSCULAR; INTRAVENOUS ONCE
Status: COMPLETED | OUTPATIENT
Start: 2019-12-02 | End: 2019-12-02

## 2019-12-02 RX ORDER — CYANOCOBALAMIN 1000 UG/ML
1000 INJECTION, SOLUTION INTRAMUSCULAR; SUBCUTANEOUS
Status: DISCONTINUED | OUTPATIENT
Start: 2019-12-02 | End: 2021-01-28

## 2019-12-02 RX ADMIN — THIAMINE HYDROCHLORIDE 200 MG: 100 INJECTION, SOLUTION INTRAMUSCULAR; INTRAVENOUS at 08:46

## 2019-12-02 RX ADMIN — CYANOCOBALAMIN 1000 MCG: 1000 INJECTION, SOLUTION INTRAMUSCULAR; SUBCUTANEOUS at 08:46

## 2019-12-05 ENCOUNTER — HOSPITAL ENCOUNTER (OUTPATIENT)
Dept: INFUSION THERAPY | Facility: HOSPITAL | Age: 69
Discharge: HOME | End: 2019-12-05
Admitting: ALLERGY & IMMUNOLOGY

## 2019-12-05 VITALS
DIASTOLIC BLOOD PRESSURE: 70 MMHG | TEMPERATURE: 96.9 F | HEART RATE: 66 BPM | SYSTOLIC BLOOD PRESSURE: 123 MMHG | RESPIRATION RATE: 16 BRPM | OXYGEN SATURATION: 94 %

## 2019-12-05 DIAGNOSIS — J45.40 MODERATE PERSISTENT ASTHMA, UNSPECIFIED WHETHER COMPLICATED: Primary | ICD-10-CM

## 2019-12-05 PROCEDURE — 96372 THER/PROPH/DIAG INJ SC/IM: CPT

## 2019-12-05 PROCEDURE — 25010000002 OMALIZUMAB PER 5 MG: Performed by: ALLERGY & IMMUNOLOGY

## 2019-12-05 RX ADMIN — OMALIZUMAB 150 MG: 202.5 INJECTION, SOLUTION SUBCUTANEOUS at 09:50

## 2019-12-05 NOTE — PROGRESS NOTES
Tolerated injection well.  Vital signs stable.  Monitored for 30 minutes post injection per MD order without incident.  Pt DC per ambulation with family @ 9247.

## 2019-12-09 RX ORDER — AZELASTINE 1 MG/ML
1 SPRAY, METERED NASAL 2 TIMES DAILY
Qty: 30 ML | Refills: 2 | Status: SHIPPED | OUTPATIENT
Start: 2019-12-09 | End: 2019-12-09 | Stop reason: SDUPTHER

## 2019-12-09 RX ORDER — AZELASTINE 1 MG/ML
1 SPRAY, METERED NASAL 2 TIMES DAILY
Qty: 30 ML | Refills: 2 | Status: SHIPPED | OUTPATIENT
Start: 2019-12-09 | End: 2019-12-26 | Stop reason: SDUPTHER

## 2019-12-09 NOTE — TELEPHONE ENCOUNTER
jean marie Kim didn't go thru, can you please approve again, I changed order to print so I can fax it over

## 2019-12-13 ENCOUNTER — OFFICE VISIT (OUTPATIENT)
Dept: FAMILY MEDICINE CLINIC | Facility: CLINIC | Age: 69
End: 2019-12-13

## 2019-12-13 VITALS
OXYGEN SATURATION: 93 % | HEIGHT: 64 IN | WEIGHT: 217.2 LBS | TEMPERATURE: 97.9 F | DIASTOLIC BLOOD PRESSURE: 74 MMHG | SYSTOLIC BLOOD PRESSURE: 118 MMHG | HEART RATE: 78 BPM | BODY MASS INDEX: 37.08 KG/M2

## 2019-12-13 DIAGNOSIS — E03.9 HYPOTHYROIDISM, UNSPECIFIED TYPE: ICD-10-CM

## 2019-12-13 DIAGNOSIS — J45.40 MODERATE PERSISTENT ASTHMA, UNSPECIFIED WHETHER COMPLICATED: ICD-10-CM

## 2019-12-13 DIAGNOSIS — E78.5 HYPERLIPIDEMIA, UNSPECIFIED HYPERLIPIDEMIA TYPE: Primary | ICD-10-CM

## 2019-12-13 DIAGNOSIS — K21.9 GASTROESOPHAGEAL REFLUX DISEASE WITHOUT ESOPHAGITIS: ICD-10-CM

## 2019-12-13 DIAGNOSIS — Z98.84 S/P GASTRIC BYPASS: ICD-10-CM

## 2019-12-13 PROBLEM — I10 HYPERTENSION: Status: RESOLVED | Noted: 2019-11-06 | Resolved: 2019-12-13

## 2019-12-13 PROCEDURE — 99213 OFFICE O/P EST LOW 20 MIN: CPT | Performed by: FAMILY MEDICINE

## 2019-12-13 RX ORDER — ESOMEPRAZOLE MAGNESIUM 40 MG/1
40 CAPSULE, DELAYED RELEASE ORAL 2 TIMES DAILY
Qty: 180 CAPSULE | Refills: 3 | Status: SHIPPED | OUTPATIENT
Start: 2019-12-13 | End: 2021-01-28 | Stop reason: SDUPTHER

## 2019-12-13 RX ORDER — POTASSIUM CHLORIDE 20 MEQ/1
20 TABLET, EXTENDED RELEASE ORAL 2 TIMES DAILY
Qty: 180 TABLET | Refills: 3 | Status: SHIPPED | OUTPATIENT
Start: 2019-12-13 | End: 2021-01-28 | Stop reason: SDUPTHER

## 2019-12-13 RX ORDER — INFLUENZA A VIRUS A/MICHIGAN/45/2015 X-275 (H1N1) ANTIGEN (FORMALDEHYDE INACTIVATED), INFLUENZA A VIRUS A/SINGAPORE/INFIMH-16-0019/2016 IVR-186 (H3N2) ANTIGEN (FORMALDEHYDE INACTIVATED), AND INFLUENZA B VIRUS B/MARYLAND/15/2016 BX-69A (A B/COLORADO/6/2017-LIKE VIRUS) ANTIGEN (FORMALDEHYDE INACTIVATED) 60; 60; 60 UG/.5ML; UG/.5ML; UG/.5ML
INJECTION, SUSPENSION INTRAMUSCULAR
COMMUNITY
Start: 2019-10-09 | End: 2020-03-17

## 2019-12-13 RX ORDER — LEVOTHYROXINE SODIUM 0.05 MG/1
50 TABLET ORAL DAILY
Qty: 90 TABLET | Refills: 3 | Status: SHIPPED | OUTPATIENT
Start: 2019-12-13 | End: 2021-02-26

## 2019-12-13 RX ORDER — ESCITALOPRAM OXALATE 20 MG/1
20 TABLET ORAL DAILY
Qty: 90 TABLET | Refills: 3 | Status: SHIPPED | OUTPATIENT
Start: 2019-12-13 | End: 2021-01-28 | Stop reason: SDUPTHER

## 2019-12-13 RX ORDER — FERROUS SULFATE 325(65) MG
325 TABLET ORAL
Qty: 90 TABLET | Refills: 3 | Status: SHIPPED | OUTPATIENT
Start: 2019-12-13 | End: 2021-09-17 | Stop reason: SDUPTHER

## 2019-12-13 RX ORDER — CELECOXIB 200 MG/1
200 CAPSULE ORAL DAILY
Qty: 90 CAPSULE | Refills: 3 | Status: SHIPPED | OUTPATIENT
Start: 2019-12-13 | End: 2021-01-28 | Stop reason: SDUPTHER

## 2019-12-13 NOTE — PROGRESS NOTES
Subjective   Octavia Pendleton is a 69 y.o. female.     Chief Complaint   Patient presents with   • Hypertension     follow up   • Hyperlipidemia        Patient presents for follow-up after going off her blood pressure medicine abdominoplasty.  She continues to do very well and is wondering about tapering off more medications.  She has continued to lose more weight.         The following portions of the patient's history were reviewed and updated as appropriate: allergies, current medications, past family history, past medical history, past social history, past surgical history and problem list.    Past Medical History:   Diagnosis Date   • Abnormal EEG    • Acute upper respiratory infection    • Allergic conjunctivitis    • Allergic rhinitis    • Anemia    • Anesthesia complication     HARD TO AWAKEN   • Arthritis    • Asthma    • Atopic dermatitis    • BMI 40.0-44.9, adult (CMS/[facility])    • Bronchitis    • Cataract    • Cervical neuritis    • Cervical stenosis of spine    • Cervicalgia    • Chronic obstructive asthma with acute exacerbation (CMS/[facility])    • DDD (degenerative disc disease), lumbar    • Depression    • Disease of thyroid gland    • Dysphagia     RESOLVED AFTER SURG   • Erosive esophagitis    • Facet arthritis of cervical region    • Generalized osteoarthritis of multiple sites    • GERD (gastroesophageal reflux disease)    • Hemangioma    • History of esophageal stricture    • Hypercholesteremia    • Hyperlipidemia    • Hypertension    • Hypokalemia    • Hypothyroidism    • Influenza A with respiratory manifestations    • Internal hemorrhoids    • Intestinal malabsorption    • Mixed hyperlipidemia    • Need for prophylactic vaccination against Streptococcus pneumoniae (pneumococcus) and influenza    • Neoplasm of uncertain behavior of skin    • JAY on CPAP     NEW SLEEP TESTDONE SEPT, TRIAL OF NOT WEARING CPAP   • Osteopenia of lumbar spine    • Osteopenia of spine    • Osteoporosis    • Pain in arm    •  Panniculitis    • PONV (postoperative nausea and vomiting)    • Screening for malignant neoplasm of breast    • Screening for malignant neoplasm of cervix    • Thiamine deficiency    • TIA (transient ischemic attack)    • Vitamin B deficiency    • Vitamin B12 deficiency    • Vitamin D deficiency    • Wears glasses        Past Surgical History:   Procedure Laterality Date   • ABDOMINOPLASTY N/A 11/1/2019    Procedure: ABDOMINOPLASTY;  Surgeon: Waterhouse, Maurine, MD;  Location: [facility];  Service: Plastics   • CARDIAC CATHETERIZATION  10/2009    Abnormal blood vessels   • CARPAL TUNNEL RELEASE Left    • CERVICAL DISC SURGERY  04/21/2016    bones spurs removed as well   • CERVICAL SPINE SURGERY  04/20/2017   • CHOLECYSTECTOMY     • DIAGNOSTIC LAPAROSCOPY     • ELBOW PROCEDURE Left     release of nerve similar to carpal tunnel   • EYE SURGERY Left 2000    CYST, CATARACT   • GASTRIC BYPASS  10/2010   • HAND SURGERY Right    • HEMORRHOIDECTOMY     • HYSTERECTOMY     • INGUINAL HERNIA REPAIR Bilateral    • PANNICULECTOMY N/A 11/1/2019    Procedure: PANNICULECTOMY;  Surgeon: Waterhouse, Maurine, MD;  Location: [facility];  Service: Plastics   • REPLACEMENT TOTAL KNEE BILATERAL     • RIB FRACTURE SURGERY      1ST RIB REMOVED, DUE NUMBNESS IN ARM   • ROTATOR CUFF REPAIR Bilateral        Family History   Problem Relation Age of Onset   • Vasculitis Father         Cerebral   • Coronary artery disease Father    • Hypertension Sister    • Diabetes Other    • Malig Hyperthermia Neg Hx        Social History     Socioeconomic History   • Marital status:      Spouse name: Not on file   • Number of children: Not on file   • Years of education: Not on file   • Highest education level: Not on file   Tobacco Use   • Smoking status: Never Smoker   • Smokeless tobacco: Never Used   Substance and Sexual Activity   • Alcohol use: Yes     Comment: rare use   • Drug use: No   • Sexual activity: Defer         Current  Outpatient Medications:   •  albuterol (PROVENTIL HFA;VENTOLIN HFA) 108 (90 BASE) MCG/ACT inhaler, Inhale 2 puffs every 4 (four) hours as needed for wheezing., Disp: , Rfl:   •  azelastine (ASTELIN) 0.1 % nasal spray, 1 spray into the nostril(s) as directed by provider 2 (Two) Times a Day. Use in each nostril as directed, Disp: 30 mL, Rfl: 2  •  azelastine (OPTIVAR) 0.05 % ophthalmic solution, Administer 1 drop to both eyes 2 (Two) Times a Day., Disp: 6 each, Rfl: 2  •  celecoxib (CeleBREX) 200 MG capsule, Take 1 capsule by mouth Daily., Disp: 90 capsule, Rfl: 3  •  Cyanocobalamin 1000 MCG/ML kit, Inject 1 mL as directed Every 30 (Thirty) Days., Disp: 3 each, Rfl: 3  •  escitalopram (LEXAPRO) 20 MG tablet, Take 1 tablet by mouth Daily., Disp: 90 tablet, Rfl: 3  •  esomeprazole (nexIUM) 40 MG capsule, Take 1 capsule by mouth 2 (Two) Times a Day., Disp: 180 capsule, Rfl: 3  •  ferrous sulfate 325 (65 FE) MG tablet, Take 1 tablet by mouth Daily With Breakfast., Disp: 90 tablet, Rfl: 3  •  fexofenadine (ALLEGRA) 180 MG tablet, Take 180 mg by mouth daily., Disp: , Rfl:   •  fluticasone (FLONASE) 50 MCG/ACT nasal spray, 2 sprays into each nostril daily. Administer 2 sprays in each nostril for each dose., Disp: , Rfl:   •  Fluticasone Furoate-Vilanterol (BREO ELLIPTA IN), Inhale 2 puffs Daily., Disp: , Rfl:   •  FLUZONE HIGH-DOSE 0.5 ML suspension prefilled syringe injection, , Disp: , Rfl:   •  levalbuterol (XOPENEX) 0.63 MG/3ML nebulizer solution, Take 1 ampule by nebulization every 4 (four) hours as needed for wheezing., Disp: , Rfl:   •  levothyroxine (SYNTHROID, LEVOTHROID) 50 MCG tablet, Take 1 tablet by mouth Daily., Disp: 90 tablet, Rfl: 3  •  omalizumab (XOLAIR) 150 MG injection, Inject 150 mg under the skin every 30 (thirty) days., Disp: , Rfl:   •  potassium chloride (K-DUR,KLOR-CON) 20 MEQ CR tablet, Take 1 tablet by mouth 2 (Two) Times a Day., Disp: 180 tablet, Rfl: 3  •  Probiotic Product (FLORAJEN3) capsule,  "Take 1 tablet by mouth daily., Disp: , Rfl:   •  thiamine (B-1) 100 MG/ML injection, Inject 2 mL into the appropriate muscle as directed by prescriber Every 30 (Thirty) Days. (Patient taking differently: Inject 200 mg into the appropriate muscle as directed by prescriber Every 3 (Three) Months.), Disp: 2 mL, Rfl: 2    Current Facility-Administered Medications:   •  cyanocobalamin injection 1,000 mcg, 1,000 mcg, Intramuscular, Q28 Days, Kehrer, Meredith Lea, MD, 1,000 mcg at 12/02/19 0846  •  thiamine (B-1) injection 200 mg, 200 mg, Intramuscular, Daily, Kehrer, Meredith Lea, MD, 200 mg at 08/12/19 1048    Review of Systems   Constitutional: Negative for chills, fatigue and fever.   HENT: Negative for congestion, rhinorrhea and sore throat.    Respiratory: Negative for cough and shortness of breath.    Cardiovascular: Negative for chest pain and leg swelling.   Gastrointestinal: Negative for abdominal pain.   Endocrine: Negative for polydipsia and polyuria.   Genitourinary: Negative for dysuria.   Musculoskeletal: Negative for arthralgias and myalgias.   Skin: Negative for rash.   Neurological: Negative for dizziness.   Hematological: Does not bruise/bleed easily.   Psychiatric/Behavioral: Negative for sleep disturbance.       Objective   Vitals:    12/13/19 0900   BP: 118/74   Pulse: 78   Temp: 97.9 °F (36.6 °C)   SpO2: 93%   Weight: 98.5 kg (217 lb 3.2 oz)   Height: 162.6 cm (64\")     Body mass index is 37.28 kg/m².  Physical Exam   Constitutional: She is oriented to person, place, and time. She appears well-developed and well-nourished.   HENT:   Head: Normocephalic and atraumatic.   Eyes: Pupils are equal, round, and reactive to light. Conjunctivae and EOM are normal.   Neck: Neck supple. No thyromegaly present.   Cardiovascular: Normal rate and regular rhythm.   No murmur heard.  Pulmonary/Chest: Effort normal and breath sounds normal. She has no wheezes.   Abdominal: Soft.   Musculoskeletal: Normal range of " motion.   Neurological: She is alert and oriented to person, place, and time. No cranial nerve deficit.   Skin: Skin is warm and dry.   Psychiatric: She has a normal mood and affect.         Assessment/Plan   Octavia was seen today for hypertension and hyperlipidemia.    Diagnoses and all orders for this visit:    Hyperlipidemia, unspecified hyperlipidemia type    S/P gastric bypass  -     potassium chloride (K-DUR,KLOR-CON) 20 MEQ CR tablet; Take 1 tablet by mouth 2 (Two) Times a Day.  -     ferrous sulfate 325 (65 FE) MG tablet; Take 1 tablet by mouth Daily With Breakfast.  -     Cyanocobalamin 1000 MCG/ML kit; Inject 1 mL as directed Every 30 (Thirty) Days.    Hypothyroidism, unspecified type  -     levothyroxine (SYNTHROID, LEVOTHROID) 50 MCG tablet; Take 1 tablet by mouth Daily.    Moderate persistent asthma, unspecified whether complicated    Gastroesophageal reflux disease without esophagitis  -     esomeprazole (nexIUM) 40 MG capsule; Take 1 capsule by mouth 2 (Two) Times a Day.    Other orders  -     escitalopram (LEXAPRO) 20 MG tablet; Take 1 tablet by mouth Daily.  -     celecoxib (CeleBREX) 200 MG capsule; Take 1 capsule by mouth Daily.               There are no Patient Instructions on file for this visit.

## 2019-12-26 RX ORDER — AZELASTINE HYDROCHLORIDE 0.5 MG/ML
SOLUTION/ DROPS OPHTHALMIC
Qty: 6 EACH | Refills: 1 | Status: SHIPPED | OUTPATIENT
Start: 2019-12-26 | End: 2021-09-17 | Stop reason: SDUPTHER

## 2019-12-26 RX ORDER — AZELASTINE 1 MG/ML
1 SPRAY, METERED NASAL 2 TIMES DAILY
Qty: 30 ML | Refills: 2 | Status: SHIPPED | OUTPATIENT
Start: 2019-12-26 | End: 2019-12-30 | Stop reason: SDUPTHER

## 2019-12-30 RX ORDER — AZELASTINE 1 MG/ML
1 SPRAY, METERED NASAL 2 TIMES DAILY
Qty: 30 ML | Refills: 2 | Status: SHIPPED | OUTPATIENT
Start: 2019-12-30 | End: 2021-09-17 | Stop reason: SDUPTHER

## 2019-12-31 ENCOUNTER — CLINICAL SUPPORT (OUTPATIENT)
Dept: FAMILY MEDICINE CLINIC | Facility: CLINIC | Age: 69
End: 2019-12-31

## 2019-12-31 DIAGNOSIS — E53.8 VITAMIN B12 DEFICIENCY: Primary | ICD-10-CM

## 2019-12-31 PROCEDURE — 96372 THER/PROPH/DIAG INJ SC/IM: CPT | Performed by: FAMILY MEDICINE

## 2019-12-31 RX ADMIN — CYANOCOBALAMIN 1000 MCG: 1000 INJECTION, SOLUTION INTRAMUSCULAR; SUBCUTANEOUS at 09:19

## 2020-01-02 ENCOUNTER — HOSPITAL ENCOUNTER (OUTPATIENT)
Dept: INFUSION THERAPY | Facility: HOSPITAL | Age: 70
Discharge: HOME | End: 2020-01-02
Admitting: ALLERGY & IMMUNOLOGY

## 2020-01-02 VITALS
HEART RATE: 74 BPM | SYSTOLIC BLOOD PRESSURE: 129 MMHG | OXYGEN SATURATION: 93 % | RESPIRATION RATE: 16 BRPM | TEMPERATURE: 97.3 F | DIASTOLIC BLOOD PRESSURE: 80 MMHG

## 2020-01-02 DIAGNOSIS — J45.40 MODERATE PERSISTENT ASTHMA, UNSPECIFIED WHETHER COMPLICATED: Primary | ICD-10-CM

## 2020-01-02 PROCEDURE — 96372 THER/PROPH/DIAG INJ SC/IM: CPT

## 2020-01-02 PROCEDURE — 25010000002 OMALIZUMAB PER 5 MG: Performed by: ALLERGY & IMMUNOLOGY

## 2020-01-02 RX ADMIN — OMALIZUMAB 150 MG: 202.5 INJECTION, SOLUTION SUBCUTANEOUS at 08:11

## 2020-01-02 NOTE — PROGRESS NOTES
Patient tolerated injection without difficulty. No s/s of reaction noted. Patient discharged at 0846 per ambulation.

## 2020-01-27 ENCOUNTER — CLINICAL SUPPORT (OUTPATIENT)
Dept: FAMILY MEDICINE CLINIC | Facility: CLINIC | Age: 70
End: 2020-01-27

## 2020-01-27 DIAGNOSIS — E53.8 VITAMIN B12 DEFICIENCY: Primary | ICD-10-CM

## 2020-01-27 PROCEDURE — 96372 THER/PROPH/DIAG INJ SC/IM: CPT | Performed by: FAMILY MEDICINE

## 2020-01-27 RX ADMIN — CYANOCOBALAMIN 1000 MCG: 1000 INJECTION, SOLUTION INTRAMUSCULAR; SUBCUTANEOUS at 12:09

## 2020-01-30 ENCOUNTER — HOSPITAL ENCOUNTER (OUTPATIENT)
Dept: INFUSION THERAPY | Facility: HOSPITAL | Age: 70
Discharge: HOME | End: 2020-01-30
Admitting: ALLERGY & IMMUNOLOGY

## 2020-01-30 VITALS
TEMPERATURE: 97.7 F | OXYGEN SATURATION: 95 % | HEART RATE: 68 BPM | SYSTOLIC BLOOD PRESSURE: 139 MMHG | BODY MASS INDEX: 36.05 KG/M2 | WEIGHT: 210 LBS | DIASTOLIC BLOOD PRESSURE: 85 MMHG | RESPIRATION RATE: 22 BRPM

## 2020-01-30 DIAGNOSIS — J45.40 MODERATE PERSISTENT ASTHMA WITHOUT COMPLICATION: Primary | ICD-10-CM

## 2020-01-30 PROCEDURE — 96372 THER/PROPH/DIAG INJ SC/IM: CPT

## 2020-01-30 PROCEDURE — 25010000002 OMALIZUMAB PER 5 MG: Performed by: ALLERGY & IMMUNOLOGY

## 2020-01-30 RX ADMIN — OMALIZUMAB 150 MG: 202.5 INJECTION, SOLUTION SUBCUTANEOUS at 10:08

## 2020-03-05 ENCOUNTER — CLINICAL SUPPORT (OUTPATIENT)
Dept: FAMILY MEDICINE CLINIC | Facility: CLINIC | Age: 70
End: 2020-03-05

## 2020-03-05 ENCOUNTER — HOSPITAL ENCOUNTER (OUTPATIENT)
Dept: INFUSION THERAPY | Facility: HOSPITAL | Age: 70
Discharge: HOME | End: 2020-03-05
Admitting: ALLERGY & IMMUNOLOGY

## 2020-03-05 VITALS
DIASTOLIC BLOOD PRESSURE: 75 MMHG | SYSTOLIC BLOOD PRESSURE: 135 MMHG | HEART RATE: 62 BPM | RESPIRATION RATE: 18 BRPM | OXYGEN SATURATION: 93 % | TEMPERATURE: 96.7 F

## 2020-03-05 DIAGNOSIS — J45.40 MODERATE PERSISTENT ASTHMA WITHOUT COMPLICATION: Primary | ICD-10-CM

## 2020-03-05 PROCEDURE — 96372 THER/PROPH/DIAG INJ SC/IM: CPT | Performed by: FAMILY MEDICINE

## 2020-03-05 PROCEDURE — 25010000002 OMALIZUMAB PER 5 MG: Performed by: ALLERGY & IMMUNOLOGY

## 2020-03-05 PROCEDURE — 96372 THER/PROPH/DIAG INJ SC/IM: CPT

## 2020-03-05 RX ADMIN — THIAMINE HYDROCHLORIDE 200 MG: 100 INJECTION, SOLUTION INTRAMUSCULAR; INTRAVENOUS at 14:18

## 2020-03-05 RX ADMIN — CYANOCOBALAMIN 1000 MCG: 1000 INJECTION, SOLUTION INTRAMUSCULAR; SUBCUTANEOUS at 14:17

## 2020-03-05 RX ADMIN — OMALIZUMAB 150 MG: 202.5 INJECTION, SOLUTION SUBCUTANEOUS at 10:05

## 2020-03-05 NOTE — PROGRESS NOTES
Tolerated injection with no S/S of reaction or patient complaints.  Injection site x 1 with band aide applied.  Pt monitored for 30 minutes post injection without incident.  Pt given AVS and DC per ambulation with sister @ 1040.

## 2020-03-17 ENCOUNTER — OFFICE VISIT (OUTPATIENT)
Dept: FAMILY MEDICINE CLINIC | Facility: CLINIC | Age: 70
End: 2020-03-17

## 2020-03-17 VITALS
HEART RATE: 81 BPM | TEMPERATURE: 98.3 F | OXYGEN SATURATION: 94 % | BODY MASS INDEX: 36.02 KG/M2 | HEIGHT: 64 IN | SYSTOLIC BLOOD PRESSURE: 130 MMHG | WEIGHT: 211 LBS | DIASTOLIC BLOOD PRESSURE: 78 MMHG

## 2020-03-17 DIAGNOSIS — J01.00 ACUTE NON-RECURRENT MAXILLARY SINUSITIS: ICD-10-CM

## 2020-03-17 DIAGNOSIS — E53.8 VITAMIN B12 DEFICIENCY: ICD-10-CM

## 2020-03-17 DIAGNOSIS — K21.9 GASTROESOPHAGEAL REFLUX DISEASE WITHOUT ESOPHAGITIS: ICD-10-CM

## 2020-03-17 DIAGNOSIS — Z98.84 S/P GASTRIC BYPASS: ICD-10-CM

## 2020-03-17 DIAGNOSIS — J45.40 MODERATE PERSISTENT ASTHMA, UNSPECIFIED WHETHER COMPLICATED: ICD-10-CM

## 2020-03-17 DIAGNOSIS — F32.A DEPRESSION, UNSPECIFIED DEPRESSION TYPE: ICD-10-CM

## 2020-03-17 DIAGNOSIS — E03.9 HYPOTHYROIDISM, UNSPECIFIED TYPE: Primary | ICD-10-CM

## 2020-03-17 PROCEDURE — 99214 OFFICE O/P EST MOD 30 MIN: CPT | Performed by: FAMILY MEDICINE

## 2020-03-17 RX ORDER — AMOXICILLIN 500 MG/1
1000 CAPSULE ORAL 2 TIMES DAILY
Qty: 28 CAPSULE | Refills: 0 | Status: SHIPPED | OUTPATIENT
Start: 2020-03-17 | End: 2020-04-02

## 2020-03-17 RX ORDER — CYANOCOBALAMIN 1000 UG/ML
INJECTION, SOLUTION INTRAMUSCULAR; SUBCUTANEOUS
COMMUNITY
Start: 2020-03-03 | End: 2020-03-17

## 2020-03-17 NOTE — PROGRESS NOTES
Subjective   Octavia Pendleton is a 69 y.o. female.     Chief Complaint   Patient presents with   • Cough   • Ear Drainage     fluid on ear drum, allergist said   • Hoarse   • Headache        Patient presents for her usual checkup on hypothyroidism depression and multiple vitamin deficiencies after gastric bypass.  She does have severe asthma and allergies and was having a bad flare of her allergies last week when her allergist gave her a 5-day burst of steroids thinking that would help.  Now her congestion has gotten worse along with her sinus pain and pressure and she is developed pain behind her eyes into her maxillary sinuses with a bad taste in her mouth.  Patient says that she has a constant taste of garlic in her mouth.  She is compliant with all her allergy and asthma medication.  She is getting significant fatigue from the sinus infection as well.  As far as her blood work for her chronic medical problems they were all in good order last time and she is not due for any today.         The following portions of the patient's history were reviewed and updated as appropriate: allergies, current medications, past family history, past medical history, past social history, past surgical history and problem list.    Past Medical History:   Diagnosis Date   • Abnormal EEG    • Acute upper respiratory infection    • Allergic conjunctivitis    • Allergic rhinitis    • Anemia    • Anesthesia complication     HARD TO AWAKEN   • Arthritis    • Asthma    • Atopic dermatitis    • BMI 40.0-44.9, adult (CMS/[facility])    • Bronchitis    • Cataract    • Cervical neuritis    • Cervical stenosis of spine    • Cervicalgia    • Chronic obstructive asthma with acute exacerbation (CMS/[facility])    • DDD (degenerative disc disease), lumbar    • Depression    • Disease of thyroid gland    • Dysphagia     RESOLVED AFTER SURG   • Erosive esophagitis    • Facet arthritis of cervical region    • Generalized osteoarthritis of multiple sites    •  GERD (gastroesophageal reflux disease)    • Hemangioma    • History of esophageal stricture    • Hypercholesteremia    • Hyperlipidemia    • Hypertension    • Hypokalemia    • Hypothyroidism    • Influenza A with respiratory manifestations    • Internal hemorrhoids    • Intestinal malabsorption    • Mixed hyperlipidemia    • Need for prophylactic vaccination against Streptococcus pneumoniae (pneumococcus) and influenza    • Neoplasm of uncertain behavior of skin    • JAY on CPAP     NEW SLEEP TESTDONE SEPT, TRIAL OF NOT WEARING CPAP   • Osteopenia of lumbar spine    • Osteopenia of spine    • Osteoporosis    • Pain in arm    • Panniculitis    • PONV (postoperative nausea and vomiting)    • Screening for malignant neoplasm of breast    • Screening for malignant neoplasm of cervix    • Thiamine deficiency    • TIA (transient ischemic attack)    • Vitamin B deficiency    • Vitamin B12 deficiency    • Vitamin D deficiency    • Wears glasses        Past Surgical History:   Procedure Laterality Date   • ABDOMINOPLASTY N/A 11/1/2019    Procedure: ABDOMINOPLASTY;  Surgeon: Waterhouse, Maurine, MD;  Location: [facility];  Service: Plastics   • CARDIAC CATHETERIZATION  10/2009    Abnormal blood vessels   • CARPAL TUNNEL RELEASE Left    • CERVICAL DISC SURGERY  04/21/2016    bones spurs removed as well   • CERVICAL SPINE SURGERY  04/20/2017   • CHOLECYSTECTOMY     • DIAGNOSTIC LAPAROSCOPY     • ELBOW PROCEDURE Left     release of nerve similar to carpal tunnel   • EYE SURGERY Left 2000    CYST, CATARACT   • GASTRIC BYPASS  10/2010   • HAND SURGERY Right    • HEMORRHOIDECTOMY     • HYSTERECTOMY     • INGUINAL HERNIA REPAIR Bilateral    • PANNICULECTOMY N/A 11/1/2019    Procedure: PANNICULECTOMY;  Surgeon: Waterhouse, Maurine, MD;  Location: [facility];  Service: Plastics   • REPLACEMENT TOTAL KNEE BILATERAL     • RIB FRACTURE SURGERY      1ST RIB REMOVED, DUE NUMBNESS IN ARM   • ROTATOR CUFF REPAIR Bilateral         Family History   Problem Relation Age of Onset   • Vasculitis Father         Cerebral   • Coronary artery disease Father    • Hypertension Sister    • Diabetes Other    • Malig Hyperthermia Neg Hx        Social History     Socioeconomic History   • Marital status:      Spouse name: Not on file   • Number of children: Not on file   • Years of education: Not on file   • Highest education level: Not on file   Tobacco Use   • Smoking status: Never Smoker   • Smokeless tobacco: Never Used   Substance and Sexual Activity   • Alcohol use: Yes     Comment: rare use   • Drug use: No   • Sexual activity: Defer         Current Outpatient Medications:   •  albuterol (PROVENTIL HFA;VENTOLIN HFA) 108 (90 BASE) MCG/ACT inhaler, Inhale 2 puffs every 4 (four) hours as needed for wheezing., Disp: , Rfl:   •  azelastine (ASTELIN) 0.1 % nasal spray, 1 spray into the nostril(s) as directed by provider 2 (Two) Times a Day. Use in each nostril as directed, Disp: 30 mL, Rfl: 2  •  azelastine (OPTIVAR) 0.05 % ophthalmic solution, ADMINISTER ONE DROP INTO BOTH EYES TWO TIMES A DAY, Disp: 6 each, Rfl: 1  •  celecoxib (CeleBREX) 200 MG capsule, Take 1 capsule by mouth Daily., Disp: 90 capsule, Rfl: 3  •  escitalopram (LEXAPRO) 20 MG tablet, Take 1 tablet by mouth Daily., Disp: 90 tablet, Rfl: 3  •  esomeprazole (nexIUM) 40 MG capsule, Take 1 capsule by mouth 2 (Two) Times a Day., Disp: 180 capsule, Rfl: 3  •  ferrous sulfate 325 (65 FE) MG tablet, Take 1 tablet by mouth Daily With Breakfast., Disp: 90 tablet, Rfl: 3  •  fexofenadine (ALLEGRA) 180 MG tablet, Take 180 mg by mouth daily., Disp: , Rfl:   •  fluticasone (FLONASE) 50 MCG/ACT nasal spray, 2 sprays into each nostril daily. Administer 2 sprays in each nostril for each dose., Disp: , Rfl:   •  Fluticasone Furoate-Vilanterol (BREO ELLIPTA IN), Inhale 2 puffs Daily., Disp: , Rfl:   •  levalbuterol (XOPENEX) 0.63 MG/3ML nebulizer solution, Take 1 ampule by nebulization every 4  (four) hours as needed for wheezing., Disp: , Rfl:   •  levothyroxine (SYNTHROID, LEVOTHROID) 50 MCG tablet, Take 1 tablet by mouth Daily., Disp: 90 tablet, Rfl: 3  •  omalizumab (XOLAIR) 150 MG injection, Inject 150 mg under the skin every 30 (thirty) days., Disp: , Rfl:   •  potassium chloride (K-DUR,KLOR-CON) 20 MEQ CR tablet, Take 1 tablet by mouth 2 (Two) Times a Day., Disp: 180 tablet, Rfl: 3  •  Probiotic Product (FLORAJEN3) capsule, Take 1 tablet by mouth daily., Disp: , Rfl:   •  thiamine (B-1) 100 MG/ML injection, Inject 2 mL into the appropriate muscle as directed by prescriber Every 30 (Thirty) Days. (Patient taking differently: Inject 200 mg into the appropriate muscle as directed by prescriber Every 3 (Three) Months.), Disp: 2 mL, Rfl: 2  •  amoxicillin (AMOXIL) 500 MG capsule, Take 2 capsules by mouth 2 (Two) Times a Day., Disp: 28 capsule, Rfl: 0    Current Facility-Administered Medications:   •  cyanocobalamin injection 1,000 mcg, 1,000 mcg, Intramuscular, Q28 Days, Kehrer, Meredith Lea, MD, 1,000 mcg at 03/05/20 1417  •  thiamine (B-1) injection 200 mg, 200 mg, Intramuscular, Daily, Kehrer, Meredith Lea, MD, 200 mg at 03/05/20 1418    Review of Systems   Constitutional: Positive for chills. Negative for fatigue and fever.   HENT: Positive for ear pain, sinus pain and voice change. Negative for congestion, rhinorrhea and sore throat.    Respiratory: Positive for cough. Negative for shortness of breath.    Cardiovascular: Negative for chest pain and leg swelling.   Gastrointestinal: Negative for abdominal pain.   Endocrine: Negative for polydipsia and polyuria.   Genitourinary: Negative for dysuria.   Musculoskeletal: Negative for arthralgias and myalgias.   Skin: Negative for rash.   Neurological: Positive for headaches. Negative for dizziness.   Hematological: Does not bruise/bleed easily.   Psychiatric/Behavioral: Negative for sleep disturbance.       Objective   Vitals:    03/17/20 0904   BP:  "130/78   Pulse: 81   Temp: 98.3 °F (36.8 °C)   SpO2: 94%   Weight: 95.7 kg (211 lb)   Height: 162.6 cm (64\")     Body mass index is 36.22 kg/m².  Physical Exam   Constitutional: She is oriented to person, place, and time. She appears well-developed and well-nourished.   HENT:   Head: Normocephalic and atraumatic.   Right Ear: Tympanic membrane and ear canal normal.   Left Ear: Tympanic membrane and ear canal normal.   Nose: Mucosal edema and rhinorrhea present. Right sinus exhibits maxillary sinus tenderness. Left sinus exhibits maxillary sinus tenderness.   Purulent postnasal drainage   Eyes: Pupils are equal, round, and reactive to light. Conjunctivae and EOM are normal.   Neck: Neck supple. No thyromegaly present.   Cardiovascular: Normal rate and regular rhythm.   No murmur heard.  Pulmonary/Chest: Effort normal and breath sounds normal. She has no wheezes.   Abdominal: Soft.   Musculoskeletal: Normal range of motion.   Neurological: She is alert and oriented to person, place, and time. No cranial nerve deficit.   Skin: Skin is warm and dry.   Psychiatric: She has a normal mood and affect.         Assessment/Plan   Octavia was seen today for cough, ear drainage, hoarse and headache.    Diagnoses and all orders for this visit:    Hypothyroidism, unspecified type    S/P gastric bypass    Gastroesophageal reflux disease without esophagitis    Depression, unspecified depression type    Acute non-recurrent maxillary sinusitis  -     amoxicillin (AMOXIL) 500 MG capsule; Take 2 capsules by mouth 2 (Two) Times a Day.    Moderate persistent asthma, unspecified whether complicated    Vitamin B12 deficiency               There are no Patient Instructions on file for this visit.  "

## 2020-04-02 ENCOUNTER — HOSPITAL ENCOUNTER (OUTPATIENT)
Dept: INFUSION THERAPY | Facility: HOSPITAL | Age: 70
Discharge: HOME | End: 2020-04-02
Admitting: ALLERGY & IMMUNOLOGY

## 2020-04-02 VITALS
OXYGEN SATURATION: 98 % | SYSTOLIC BLOOD PRESSURE: 133 MMHG | HEART RATE: 75 BPM | TEMPERATURE: 97.5 F | DIASTOLIC BLOOD PRESSURE: 84 MMHG | RESPIRATION RATE: 20 BRPM

## 2020-04-02 DIAGNOSIS — J45.40 MODERATE PERSISTENT ASTHMA WITHOUT COMPLICATION: Primary | ICD-10-CM

## 2020-04-02 PROCEDURE — 96372 THER/PROPH/DIAG INJ SC/IM: CPT

## 2020-04-02 PROCEDURE — 25010000002 OMALIZUMAB PER 5 MG: Performed by: ALLERGY & IMMUNOLOGY

## 2020-04-02 RX ADMIN — OMALIZUMAB 150 MG: 202.5 INJECTION, SOLUTION SUBCUTANEOUS at 09:59

## 2020-04-30 ENCOUNTER — HOSPITAL ENCOUNTER (OUTPATIENT)
Dept: INFUSION THERAPY | Facility: HOSPITAL | Age: 70
Discharge: HOME | End: 2020-04-30
Admitting: ALLERGY & IMMUNOLOGY

## 2020-04-30 VITALS
TEMPERATURE: 97.8 F | RESPIRATION RATE: 20 BRPM | DIASTOLIC BLOOD PRESSURE: 74 MMHG | OXYGEN SATURATION: 94 % | HEART RATE: 77 BPM | SYSTOLIC BLOOD PRESSURE: 121 MMHG

## 2020-04-30 DIAGNOSIS — J45.40 MODERATE PERSISTENT ASTHMA WITHOUT COMPLICATION: Primary | ICD-10-CM

## 2020-04-30 PROCEDURE — 25010000002 OMALIZUMAB PER 5 MG: Performed by: ALLERGY & IMMUNOLOGY

## 2020-04-30 PROCEDURE — 96372 THER/PROPH/DIAG INJ SC/IM: CPT

## 2020-04-30 RX ADMIN — OMALIZUMAB 150 MG: 202.5 INJECTION, SOLUTION SUBCUTANEOUS at 09:55

## 2020-05-04 ENCOUNTER — CLINICAL SUPPORT (OUTPATIENT)
Dept: FAMILY MEDICINE CLINIC | Facility: CLINIC | Age: 70
End: 2020-05-04

## 2020-05-04 VITALS — TEMPERATURE: 98.6 F

## 2020-05-04 DIAGNOSIS — E53.8 B12 DEFICIENCY: Primary | ICD-10-CM

## 2020-05-04 PROCEDURE — 96372 THER/PROPH/DIAG INJ SC/IM: CPT | Performed by: FAMILY MEDICINE

## 2020-05-04 RX ORDER — CYANOCOBALAMIN 1000 UG/ML
1000 INJECTION, SOLUTION INTRAMUSCULAR; SUBCUTANEOUS
Status: SHIPPED | OUTPATIENT
Start: 2020-05-04

## 2020-05-04 RX ADMIN — CYANOCOBALAMIN 1000 MCG: 1000 INJECTION, SOLUTION INTRAMUSCULAR; SUBCUTANEOUS at 15:21

## 2020-05-07 ENCOUNTER — APPOINTMENT (OUTPATIENT)
Dept: INFUSION THERAPY | Facility: HOSPITAL | Age: 70
End: 2020-05-07

## 2020-05-28 ENCOUNTER — HOSPITAL ENCOUNTER (OUTPATIENT)
Dept: INFUSION THERAPY | Facility: HOSPITAL | Age: 70
Discharge: HOME | End: 2020-05-28
Admitting: ALLERGY & IMMUNOLOGY

## 2020-05-28 VITALS
OXYGEN SATURATION: 94 % | SYSTOLIC BLOOD PRESSURE: 126 MMHG | DIASTOLIC BLOOD PRESSURE: 71 MMHG | HEART RATE: 72 BPM | RESPIRATION RATE: 20 BRPM | TEMPERATURE: 97.3 F

## 2020-05-28 DIAGNOSIS — J45.40 MODERATE PERSISTENT ASTHMA WITHOUT COMPLICATION: Primary | ICD-10-CM

## 2020-05-28 PROCEDURE — 96372 THER/PROPH/DIAG INJ SC/IM: CPT

## 2020-05-28 PROCEDURE — 25010000002 OMALIZUMAB PER 5 MG: Performed by: ALLERGY & IMMUNOLOGY

## 2020-05-28 RX ADMIN — OMALIZUMAB 150 MG: 202.5 INJECTION, SOLUTION SUBCUTANEOUS at 10:03

## 2020-05-29 ENCOUNTER — CLINICAL SUPPORT (OUTPATIENT)
Dept: FAMILY MEDICINE CLINIC | Facility: CLINIC | Age: 70
End: 2020-05-29

## 2020-05-29 VITALS — TEMPERATURE: 97.1 F

## 2020-05-29 PROCEDURE — 96372 THER/PROPH/DIAG INJ SC/IM: CPT | Performed by: FAMILY MEDICINE

## 2020-05-29 RX ADMIN — THIAMINE HYDROCHLORIDE 200 MG: 100 INJECTION, SOLUTION INTRAMUSCULAR; INTRAVENOUS at 11:22

## 2020-05-29 RX ADMIN — CYANOCOBALAMIN 1000 MCG: 1000 INJECTION, SOLUTION INTRAMUSCULAR; SUBCUTANEOUS at 11:21

## 2020-06-25 ENCOUNTER — HOSPITAL ENCOUNTER (OUTPATIENT)
Dept: INFUSION THERAPY | Facility: HOSPITAL | Age: 70
Discharge: HOME | End: 2020-06-25
Admitting: ALLERGY & IMMUNOLOGY

## 2020-06-25 VITALS
TEMPERATURE: 98 F | OXYGEN SATURATION: 97 % | DIASTOLIC BLOOD PRESSURE: 84 MMHG | RESPIRATION RATE: 20 BRPM | HEART RATE: 68 BPM | SYSTOLIC BLOOD PRESSURE: 138 MMHG

## 2020-06-25 DIAGNOSIS — J45.40 MODERATE PERSISTENT ASTHMA WITHOUT COMPLICATION: Primary | ICD-10-CM

## 2020-06-25 PROCEDURE — 25010000002 OMALIZUMAB PER 5 MG: Performed by: ALLERGY & IMMUNOLOGY

## 2020-06-25 PROCEDURE — 96372 THER/PROPH/DIAG INJ SC/IM: CPT

## 2020-06-25 RX ADMIN — OMALIZUMAB 150 MG: 202.5 INJECTION, SOLUTION SUBCUTANEOUS at 09:58

## 2020-06-25 NOTE — PROGRESS NOTES
Tolerated injection with no S/S of reaction or patient complaints.  Injection site x1 with band aide applied.  Pt monitored for 30 minutes post injection without incident.  AVS declined and pt DC per ambulation after completion of visit.

## 2020-06-30 ENCOUNTER — OFFICE VISIT (OUTPATIENT)
Dept: FAMILY MEDICINE CLINIC | Facility: CLINIC | Age: 70
End: 2020-06-30

## 2020-06-30 VITALS
DIASTOLIC BLOOD PRESSURE: 82 MMHG | HEIGHT: 65 IN | TEMPERATURE: 97.3 F | SYSTOLIC BLOOD PRESSURE: 146 MMHG | OXYGEN SATURATION: 100 % | HEART RATE: 77 BPM | BODY MASS INDEX: 35.49 KG/M2 | WEIGHT: 213 LBS

## 2020-06-30 DIAGNOSIS — I10 ESSENTIAL HYPERTENSION: Primary | ICD-10-CM

## 2020-06-30 DIAGNOSIS — E53.8 VITAMIN B12 DEFICIENCY: ICD-10-CM

## 2020-06-30 PROCEDURE — 99213 OFFICE O/P EST LOW 20 MIN: CPT | Performed by: FAMILY MEDICINE

## 2020-06-30 PROCEDURE — 96372 THER/PROPH/DIAG INJ SC/IM: CPT | Performed by: FAMILY MEDICINE

## 2020-06-30 RX ORDER — HYDROCHLOROTHIAZIDE 12.5 MG/1
12.5 TABLET ORAL DAILY
Qty: 90 TABLET | Refills: 0 | Status: SHIPPED | OUTPATIENT
Start: 2020-06-30 | End: 2020-10-08

## 2020-06-30 RX ADMIN — CYANOCOBALAMIN 1000 MCG: 1000 INJECTION, SOLUTION INTRAMUSCULAR; SUBCUTANEOUS at 10:40

## 2020-06-30 NOTE — PROGRESS NOTES
Subjective   Octavia Pendleton is a 69 y.o. female.     Chief Complaint   Patient presents with   • Hypertension        Patient had to see the dentist recently and her blood pressure was elevated.  She has done well being off blood pressure medicine for a long time but it is been creeping back up.  She was very well controlled on just 12.5 hydrochlorothiazide before losing a bunch of weight last year.  She has not gained any weight back but she knows that she still has a long way to go.  She denies any significant symptoms.  She had a regular checkup scheduled later this week which she canceled to come in today.         The following portions of the patient's history were reviewed and updated as appropriate: allergies, current medications, past family history, past medical history, past social history, past surgical history and problem list.    Past Medical History:   Diagnosis Date   • Abnormal EEG    • Acute upper respiratory infection    • Allergic conjunctivitis    • Allergic rhinitis    • Anemia    • Anesthesia complication     HARD TO AWAKEN   • Arthritis    • Asthma    • Atopic dermatitis    • BMI 40.0-44.9, adult (CMS/[facility])    • Bronchitis    • Cataract    • Cervical neuritis    • Cervical stenosis of spine    • Cervicalgia    • Chronic obstructive asthma with acute exacerbation (CMS/[facility])    • DDD (degenerative disc disease), lumbar    • Depression    • Disease of thyroid gland    • Dysphagia     RESOLVED AFTER SURG   • Erosive esophagitis    • Facet arthritis of cervical region    • Generalized osteoarthritis of multiple sites    • GERD (gastroesophageal reflux disease)    • Hemangioma    • History of esophageal stricture    • Hypercholesteremia    • Hyperlipidemia    • Hypertension    • Hypokalemia    • Hypothyroidism    • Influenza A with respiratory manifestations    • Internal hemorrhoids    • Intestinal malabsorption    • Mixed hyperlipidemia    • Need for prophylactic vaccination against Streptococcus  pneumoniae (pneumococcus) and influenza    • Neoplasm of uncertain behavior of skin    • JAY on CPAP     NEW SLEEP TESTDONE SEPT, TRIAL OF NOT WEARING CPAP   • Osteopenia of lumbar spine    • Osteopenia of spine    • Osteoporosis    • Pain in arm    • Panniculitis    • PONV (postoperative nausea and vomiting)    • Screening for malignant neoplasm of breast    • Screening for malignant neoplasm of cervix    • Thiamine deficiency    • TIA (transient ischemic attack)    • Vitamin B deficiency    • Vitamin B12 deficiency    • Vitamin D deficiency    • Wears glasses        Past Surgical History:   Procedure Laterality Date   • ABDOMINOPLASTY N/A 11/1/2019    Procedure: ABDOMINOPLASTY;  Surgeon: Waterhouse, Maurine, MD;  Location: [facility];  Service: Plastics   • CARDIAC CATHETERIZATION  10/2009    Abnormal blood vessels   • CARPAL TUNNEL RELEASE Left    • CERVICAL DISC SURGERY  04/21/2016    bones spurs removed as well   • CERVICAL SPINE SURGERY  04/20/2017   • CHOLECYSTECTOMY     • DIAGNOSTIC LAPAROSCOPY     • ELBOW PROCEDURE Left     release of nerve similar to carpal tunnel   • EYE SURGERY Left 2000    CYST, CATARACT   • GASTRIC BYPASS  10/2010   • HAND SURGERY Right    • HEMORRHOIDECTOMY     • HYSTERECTOMY     • INGUINAL HERNIA REPAIR Bilateral    • PANNICULECTOMY N/A 11/1/2019    Procedure: PANNICULECTOMY;  Surgeon: Waterhouse, Maurine, MD;  Location: [facility];  Service: Plastics   • REPLACEMENT TOTAL KNEE BILATERAL     • RIB FRACTURE SURGERY      1ST RIB REMOVED, DUE NUMBNESS IN ARM   • ROTATOR CUFF REPAIR Bilateral        Family History   Problem Relation Age of Onset   • Vasculitis Father         Cerebral   • Coronary artery disease Father    • Hypertension Sister    • Diabetes Other    • Malig Hyperthermia Neg Hx        Social History     Socioeconomic History   • Marital status:      Spouse name: Not on file   • Number of children: Not on file   • Years of education: Not on file   • Highest  education level: Not on file   Tobacco Use   • Smoking status: Never Smoker   • Smokeless tobacco: Never Used   Substance and Sexual Activity   • Alcohol use: Yes     Comment: rare use   • Drug use: No   • Sexual activity: Defer         Current Outpatient Medications:   •  albuterol (PROVENTIL HFA;VENTOLIN HFA) 108 (90 BASE) MCG/ACT inhaler, Inhale 2 puffs every 4 (four) hours as needed for wheezing., Disp: , Rfl:   •  azelastine (ASTELIN) 0.1 % nasal spray, 1 spray into the nostril(s) as directed by provider 2 (Two) Times a Day. Use in each nostril as directed, Disp: 30 mL, Rfl: 2  •  azelastine (OPTIVAR) 0.05 % ophthalmic solution, ADMINISTER ONE DROP INTO BOTH EYES TWO TIMES A DAY, Disp: 6 each, Rfl: 1  •  celecoxib (CeleBREX) 200 MG capsule, Take 1 capsule by mouth Daily., Disp: 90 capsule, Rfl: 3  •  escitalopram (LEXAPRO) 20 MG tablet, Take 1 tablet by mouth Daily., Disp: 90 tablet, Rfl: 3  •  esomeprazole (nexIUM) 40 MG capsule, Take 1 capsule by mouth 2 (Two) Times a Day., Disp: 180 capsule, Rfl: 3  •  ferrous sulfate 325 (65 FE) MG tablet, Take 1 tablet by mouth Daily With Breakfast., Disp: 90 tablet, Rfl: 3  •  fexofenadine (ALLEGRA) 180 MG tablet, Take 180 mg by mouth daily., Disp: , Rfl:   •  fluticasone (FLONASE) 50 MCG/ACT nasal spray, 2 sprays into each nostril daily. Administer 2 sprays in each nostril for each dose., Disp: , Rfl:   •  Fluticasone Furoate-Vilanterol (BREO ELLIPTA IN), Inhale 2 puffs Daily., Disp: , Rfl:   •  levalbuterol (XOPENEX) 0.63 MG/3ML nebulizer solution, Take 1 ampule by nebulization every 4 (four) hours as needed for wheezing., Disp: , Rfl:   •  levothyroxine (SYNTHROID, LEVOTHROID) 50 MCG tablet, Take 1 tablet by mouth Daily., Disp: 90 tablet, Rfl: 3  •  omalizumab (XOLAIR) 150 MG injection, Inject 150 mg under the skin every 30 (thirty) days., Disp: , Rfl:   •  potassium chloride (K-DUR,KLOR-CON) 20 MEQ CR tablet, Take 1 tablet by mouth 2 (Two) Times a Day., Disp: 180  "tablet, Rfl: 3  •  Probiotic Product (FLORAJEN3) capsule, Take 1 tablet by mouth daily., Disp: , Rfl:   •  thiamine (B-1) 100 MG/ML injection, Inject 2 mL into the appropriate muscle as directed by prescriber Every 30 (Thirty) Days. (Patient taking differently: Inject 200 mg into the appropriate muscle as directed by prescriber Every 3 (Three) Months.), Disp: 2 mL, Rfl: 2  •  hydroCHLOROthiazide (HYDRODIURIL) 12.5 MG tablet, Take 1 tablet by mouth Daily., Disp: 90 tablet, Rfl: 0    Current Facility-Administered Medications:   •  cyanocobalamin injection 1,000 mcg, 1,000 mcg, Intramuscular, Q28 Days, Kehrer, Meredith Lea, MD, 1,000 mcg at 05/29/20 1121  •  cyanocobalamin injection 1,000 mcg, 1,000 mcg, Intramuscular, Q28 Days, Kehrer, Meredith Lea, MD, 1,000 mcg at 06/30/20 1040  •  thiamine (B-1) injection 200 mg, 200 mg, Intramuscular, Daily, Kehrer, Meredith Lea, MD, 200 mg at 05/29/20 1122    Review of Systems   Constitutional: Negative for chills, fatigue and fever.   HENT: Negative for congestion, rhinorrhea and sore throat.    Respiratory: Negative for cough and shortness of breath.    Cardiovascular: Negative for chest pain and leg swelling.   Gastrointestinal: Negative for abdominal pain.   Endocrine: Negative for polydipsia and polyuria.   Genitourinary: Negative for dysuria.   Musculoskeletal: Negative for arthralgias and myalgias.   Skin: Negative for rash.   Neurological: Negative for dizziness.   Hematological: Does not bruise/bleed easily.   Psychiatric/Behavioral: Negative for sleep disturbance.       Objective   Vitals:    06/30/20 0948 06/30/20 1030   BP: 138/84 146/82   BP Location:  Left arm   Patient Position:  Sitting   Pulse: 77    Temp: 97.3 °F (36.3 °C)    SpO2: 100%    Weight: 96.6 kg (213 lb)    Height: 164.5 cm (64.75\")      Body mass index is 35.72 kg/m².  Physical Exam   Constitutional: She is oriented to person, place, and time. She appears well-developed and well-nourished.   HENT: "   Head: Normocephalic and atraumatic.   Eyes: Pupils are equal, round, and reactive to light. Conjunctivae and EOM are normal.   Neck: Neck supple. No thyromegaly present.   Cardiovascular: Normal rate and regular rhythm.   No murmur heard.  Pulmonary/Chest: Effort normal and breath sounds normal. She has no wheezes.   Abdominal: Soft.   Musculoskeletal: Normal range of motion.   Neurological: She is alert and oriented to person, place, and time. No cranial nerve deficit.   Skin: Skin is warm and dry.   Psychiatric: She has a normal mood and affect.         Assessment/Plan   Octavia was seen today for hypertension.    Diagnoses and all orders for this visit:    Essential hypertension    Vitamin B12 deficiency    Other orders  -     hydroCHLOROthiazide (HYDRODIURIL) 12.5 MG tablet; Take 1 tablet by mouth Daily.               There are no Patient Instructions on file for this visit.

## 2020-07-23 ENCOUNTER — HOSPITAL ENCOUNTER (OUTPATIENT)
Dept: INFUSION THERAPY | Facility: HOSPITAL | Age: 70
Discharge: HOME | End: 2020-07-23
Admitting: ALLERGY & IMMUNOLOGY

## 2020-07-23 VITALS
DIASTOLIC BLOOD PRESSURE: 79 MMHG | OXYGEN SATURATION: 95 % | HEART RATE: 69 BPM | SYSTOLIC BLOOD PRESSURE: 134 MMHG | RESPIRATION RATE: 20 BRPM | TEMPERATURE: 98.1 F

## 2020-07-23 DIAGNOSIS — J45.40 MODERATE PERSISTENT ASTHMA WITHOUT COMPLICATION: Primary | ICD-10-CM

## 2020-07-23 PROCEDURE — 25010000002 OMALIZUMAB PER 5 MG: Performed by: ALLERGY & IMMUNOLOGY

## 2020-07-23 PROCEDURE — 96372 THER/PROPH/DIAG INJ SC/IM: CPT

## 2020-07-23 RX ADMIN — OMALIZUMAB 150 MG: 202.5 INJECTION, SOLUTION SUBCUTANEOUS at 10:09

## 2020-08-10 ENCOUNTER — OFFICE VISIT (OUTPATIENT)
Dept: FAMILY MEDICINE CLINIC | Facility: CLINIC | Age: 70
End: 2020-08-10

## 2020-08-10 VITALS
HEIGHT: 65 IN | HEART RATE: 75 BPM | WEIGHT: 214.8 LBS | SYSTOLIC BLOOD PRESSURE: 126 MMHG | DIASTOLIC BLOOD PRESSURE: 86 MMHG | TEMPERATURE: 97.3 F | BODY MASS INDEX: 35.79 KG/M2 | OXYGEN SATURATION: 97 %

## 2020-08-10 DIAGNOSIS — E78.5 HYPERLIPIDEMIA, UNSPECIFIED HYPERLIPIDEMIA TYPE: ICD-10-CM

## 2020-08-10 DIAGNOSIS — I10 ESSENTIAL HYPERTENSION: Primary | ICD-10-CM

## 2020-08-10 DIAGNOSIS — E61.1 IRON DEFICIENCY: ICD-10-CM

## 2020-08-10 DIAGNOSIS — E03.9 HYPOTHYROIDISM, UNSPECIFIED TYPE: ICD-10-CM

## 2020-08-10 DIAGNOSIS — Z78.0 MENOPAUSE: ICD-10-CM

## 2020-08-10 DIAGNOSIS — E55.9 VITAMIN D DEFICIENCY: ICD-10-CM

## 2020-08-10 DIAGNOSIS — F32.A DEPRESSION, UNSPECIFIED DEPRESSION TYPE: ICD-10-CM

## 2020-08-10 DIAGNOSIS — Z98.84 S/P GASTRIC BYPASS: ICD-10-CM

## 2020-08-10 DIAGNOSIS — Z11.59 NEED FOR HEPATITIS C SCREENING TEST: ICD-10-CM

## 2020-08-10 DIAGNOSIS — E53.8 B12 DEFICIENCY: ICD-10-CM

## 2020-08-10 DIAGNOSIS — Z12.31 VISIT FOR SCREENING MAMMOGRAM: ICD-10-CM

## 2020-08-10 DIAGNOSIS — E51.9 THIAMINE DEFICIENCY: ICD-10-CM

## 2020-08-10 PROCEDURE — 96160 PT-FOCUSED HLTH RISK ASSMT: CPT | Performed by: FAMILY MEDICINE

## 2020-08-10 PROCEDURE — G0439 PPPS, SUBSEQ VISIT: HCPCS | Performed by: FAMILY MEDICINE

## 2020-08-10 PROCEDURE — 96372 THER/PROPH/DIAG INJ SC/IM: CPT | Performed by: FAMILY MEDICINE

## 2020-08-10 RX ADMIN — CYANOCOBALAMIN 1000 MCG: 1000 INJECTION, SOLUTION INTRAMUSCULAR; SUBCUTANEOUS at 10:49

## 2020-08-10 NOTE — PROGRESS NOTES
The ABCs of the Annual Wellness Visit  Subsequent Medicare Wellness Visit    Chief Complaint   Patient presents with   • Medicare Wellness-subsequent       Subjective   History of Present Illness:  Octavia Pendleton is a 70 y.o. female who presents for a Subsequent Medicare Wellness Visit.  Patient doing well and fasting for her blood work this morning.  She is compliant with all her medications as listed.    HEALTH RISK ASSESSMENT    Recent Hospitalizations:  Recently treated at the following:  Other: [facility]     Current Medical Providers:  Patient Care Team:  Kehrer, Meredith Lea, MD as PCP - General  Kehrer, Meredith Lea, MD as PCP - Family Medicine    Smoking Status:  Social History     Tobacco Use   Smoking Status Never Smoker   Smokeless Tobacco Never Used       Alcohol Consumption:  Social History     Substance and Sexual Activity   Alcohol Use Yes    Comment: rare use       Depression Screen:   PHQ-2/PHQ-9 Depression Screening 8/10/2020   Little interest or pleasure in doing things 1   Feeling down, depressed, or hopeless 1   Trouble falling or staying asleep, or sleeping too much 0   Feeling tired or having little energy 1   Poor appetite or overeating 1   Feeling bad about yourself - or that you are a failure or have let yourself or your family down 0   Trouble concentrating on things, such as reading the newspaper or watching television 0   Moving or speaking so slowly that other people could have noticed. Or the opposite - being so fidgety or restless that you have been moving around a lot more than usual 0   Thoughts that you would be better off dead, or of hurting yourself in some way 0   Total Score 4   If you checked off any problems, how difficult have these problems made it for you to do your work, take care of things at home, or get along with other people? Not difficult at all       Fall Risk Screen:  STEADI Fall Risk Assessment was completed, and patient is at HIGH risk for falls. Assessment  completed on:8/10/2020    Health Habits and Functional and Cognitive Screening:  Functional & Cognitive Status 8/10/2020   Do you have difficulty preparing food and eating? No   Do you have difficulty bathing yourself, getting dressed or grooming yourself? No   Do you have difficulty using the toilet? No   Do you have difficulty moving around from place to place? No   Do you have trouble with steps or getting out of a bed or a chair? No   Current Diet Well Balanced Diet   Dental Exam Not up to date   Eye Exam Not up to date   Exercise (times per week) 7 times per week   Current Exercise Activities Include Yard Work   Do you need help using the phone?  No   Are you deaf or do you have serious difficulty hearing?  Yes   Do you need help with transportation? No   Do you need help shopping? No   Do you need help preparing meals?  No   Do you need help with housework?  No   Do you need help with laundry? No   Do you need help taking your medications? No   Do you need help managing money? No   Do you ever drive or ride in a car without wearing a seat belt? No   Have you felt unusual stress, anger or loneliness in the last month? No   Who do you live with? Spouse   If you need help, do you have trouble finding someone available to you? No   Have you been bothered in the last four weeks by sexual problems? No   Do you have difficulty concentrating, remembering or making decisions? No         Does the patient have evidence of cognitive impairment? No    Asprin use counseling:Taking ASA appropriately as indicated    Age-appropriate Screening Schedule:  Refer to the list below for future screening recommendations based on patient's age, sex and/or medical conditions. Orders for these recommended tests are listed in the plan section. The patient has been provided with a written plan.    Health Maintenance   Topic Date Due   • TDAP/TD VACCINES (1 - Tdap) 08/02/1963   • ZOSTER VACCINE (1 of 2) 08/02/2002   • DXA SCAN  07/14/2019    • INFLUENZA VACCINE  08/01/2020   • MAMMOGRAM  09/12/2020   • LIPID PANEL  11/06/2020   • COLONOSCOPY  05/16/2024          The following portions of the patient's history were reviewed and updated as appropriate: allergies, current medications, past family history, past medical history, past social history, past surgical history and problem list.    Outpatient Medications Prior to Visit   Medication Sig Dispense Refill   • albuterol (PROVENTIL HFA;VENTOLIN HFA) 108 (90 BASE) MCG/ACT inhaler Inhale 2 puffs every 4 (four) hours as needed for wheezing.     • azelastine (ASTELIN) 0.1 % nasal spray 1 spray into the nostril(s) as directed by provider 2 (Two) Times a Day. Use in each nostril as directed 30 mL 2   • azelastine (OPTIVAR) 0.05 % ophthalmic solution ADMINISTER ONE DROP INTO BOTH EYES TWO TIMES A DAY 6 each 1   • celecoxib (CeleBREX) 200 MG capsule Take 1 capsule by mouth Daily. 90 capsule 3   • escitalopram (LEXAPRO) 20 MG tablet Take 1 tablet by mouth Daily. 90 tablet 3   • esomeprazole (nexIUM) 40 MG capsule Take 1 capsule by mouth 2 (Two) Times a Day. 180 capsule 3   • ferrous sulfate 325 (65 FE) MG tablet Take 1 tablet by mouth Daily With Breakfast. 90 tablet 3   • fexofenadine (ALLEGRA) 180 MG tablet Take 180 mg by mouth daily.     • fluticasone (FLONASE) 50 MCG/ACT nasal spray 2 sprays into each nostril daily. Administer 2 sprays in each nostril for each dose.     • Fluticasone Furoate-Vilanterol (BREO ELLIPTA IN) Inhale 2 puffs Daily.     • hydroCHLOROthiazide (HYDRODIURIL) 12.5 MG tablet Take 1 tablet by mouth Daily. 90 tablet 0   • levalbuterol (XOPENEX) 0.63 MG/3ML nebulizer solution Take 1 ampule by nebulization every 4 (four) hours as needed for wheezing.     • levothyroxine (SYNTHROID, LEVOTHROID) 50 MCG tablet Take 1 tablet by mouth Daily. 90 tablet 3   • omalizumab (XOLAIR) 150 MG injection Inject 150 mg under the skin every 30 (thirty) days.     • potassium chloride (K-DUR,KLOR-CON) 20 MEQ CR  tablet Take 1 tablet by mouth 2 (Two) Times a Day. 180 tablet 3   • Probiotic Product (FLORAJEN3) capsule Take 1 tablet by mouth daily.     • thiamine (B-1) 100 MG/ML injection Inject 2 mL into the appropriate muscle as directed by prescriber Every 30 (Thirty) Days. (Patient taking differently: Inject 200 mg into the appropriate muscle as directed by prescriber Every 3 (Three) Months.) 2 mL 2     Facility-Administered Medications Prior to Visit   Medication Dose Route Frequency Provider Last Rate Last Dose   • cyanocobalamin injection 1,000 mcg  1,000 mcg Intramuscular Q28 Days Kehrer, Meredith Lea, MD   1,000 mcg at 08/10/20 1049   • cyanocobalamin injection 1,000 mcg  1,000 mcg Intramuscular Q28 Days Kehrer, Meredith Lea, MD   1,000 mcg at 06/30/20 1040   • thiamine (B-1) injection 200 mg  200 mg Intramuscular Daily Kehrer, Meredith Lea, MD   200 mg at 05/29/20 1122       Patient Active Problem List   Diagnosis   • Asthma, moderate persistent   • JAY on CPAP   • Hypersomnia   • Chronic fatigue   • Non-restorative sleep   • Psychophysiological insomnia   • S/P abdominoplasty   • Panniculus   • Hyperlipidemia   • Hypothyroidism   • Vitamin B12 deficiency   • Vitamin D deficiency   • Thiamine deficiency   • S/P gastric bypass   • GERD (gastroesophageal reflux disease)   • Depression       Advanced Care Planning:  ACP discussion was held with the patient during this visit. Patient has an advance directive (not in EMR), copy requested.    Review of Systems   Constitutional: Negative for chills, fatigue and fever.   HENT: Negative for congestion, ear pain, rhinorrhea and sore throat.    Eyes: Negative for pain and redness.   Respiratory: Negative for cough, chest tightness and wheezing.    Cardiovascular: Negative for chest pain and leg swelling.   Gastrointestinal: Negative for abdominal pain, constipation, diarrhea, nausea and vomiting.   Endocrine: Negative for polydipsia and polyphagia.   Genitourinary: Negative for  "dysuria and hematuria.   Musculoskeletal: Negative for arthralgias and myalgias.   Skin: Negative for color change and rash.   Allergic/Immunologic: Negative.    Neurological: Negative for dizziness, weakness, light-headedness and headaches.   Hematological: Negative.    Psychiatric/Behavioral: Negative for confusion, dysphoric mood and sleep disturbance.       Compared to one year ago, the patient feels her physical health is better.  Compared to one year ago, the patient feels her mental health is better.    Reviewed chart for potential of high risk medication in the elderly: yes  Reviewed chart for potential of harmful drug interactions in the elderly:yes    Objective         Vitals:    08/10/20 1009   BP: 126/86   Pulse: 75   Temp: 97.3 °F (36.3 °C)   SpO2: 97%   Weight: 97.4 kg (214 lb 12.8 oz)   Height: 164.5 cm (64.75\")       Body mass index is 36.02 kg/m².  Discussed the patient's BMI with her. The BMI is above average; BMI management plan is completed.    Physical Exam   Constitutional: She is oriented to person, place, and time. She appears well-developed and well-nourished.   HENT:   Head: Normocephalic and atraumatic.   Mouth/Throat: Oropharynx is clear and moist.   Eyes: Pupils are equal, round, and reactive to light. EOM are normal.   Neck: Neck supple. No thyromegaly present.   Cardiovascular: Normal rate and regular rhythm.   No murmur heard.  Pulmonary/Chest: Effort normal and breath sounds normal. She has no wheezes.   Abdominal: Soft. Bowel sounds are normal. There is no tenderness.   Musculoskeletal: Normal range of motion. She exhibits no edema.   Neurological: She is alert and oriented to person, place, and time.   Skin: Skin is warm and dry.   Psychiatric: She has a normal mood and affect.             Assessment/Plan   Medicare Risks and Personalized Health Plan  CMS Preventative Services Quick Reference  Advance Directive Discussion    The above risks/problems have been discussed with the " patient.  Pertinent information has been shared with the patient in the After Visit Summary.  Follow up plans and orders are seen below in the Assessment/Plan Section.    Diagnoses and all orders for this visit:    1. Essential hypertension (Primary)  -     CBC & Differential  -     Comprehensive Metabolic Panel    2. Hypothyroidism, unspecified type  -     TSH    3. S/P gastric bypass    4. B12 deficiency  -     Vitamin B12    5. Vitamin D deficiency  -     Vitamin D 25 Hydroxy    6. Depression, unspecified depression type    7. Hyperlipidemia, unspecified hyperlipidemia type  -     Lipid Panel    8. Thiamine deficiency  -     Vitamin B1, Whole Blood    9. Iron deficiency  -     Iron and TIBC    10. Visit for screening mammogram  -     Mammo Screening Digital Tomosynthesis Bilateral With CAD; Future    11. Menopause  -     DEXA Bone Density Axial; Future    12. Need for hepatitis C screening test  -     Hepatitis C Antibody      Follow Up:  Return in about 6 months (around 2/10/2021) for Recheck BP.     An After Visit Summary and PPPS were given to the patient.

## 2020-08-10 NOTE — PATIENT INSTRUCTIONS
Look into Shingrix coverage at your pharmacy.      Medicare Wellness  Personal Prevention Plan of Service     Date of Office Visit:  08/10/2020  Encounter Provider:  Meredith Lea Kehrer, MD  Place of Service:  [facility]  Patient Name: Octavia Pendleton  :  1950    As part of the Medicare Wellness portion of your visit today, we are providing you with this personalized preventive plan of services (PPPS). This plan is based upon recommendations of the [place] Preventive Services Task Force (USPSTF) and the Advisory Committee on Immunization Practices (ACIP).    This lists the preventive care services that should be considered, and provides dates of when you are due. Items listed as completed are up-to-date and do not require any further intervention.    Health Maintenance   Topic Date Due   • TDAP/TD VACCINES (1 - Tdap) 1963   • ZOSTER VACCINE (1 of 2) 2002   • HEPATITIS C SCREENING  2017   • MEDICARE ANNUAL WELLNESS  2017   • DXA SCAN  2019   • INFLUENZA VACCINE  2020   • MAMMOGRAM  2020   • LIPID PANEL  2020   • COLONOSCOPY  2024   • Pneumococcal Vaccine Once at 65 Years Old  Completed       Orders Placed This Encounter   Procedures   • DEXA Bone Density Axial     Standing Status:   Future     Standing Expiration Date:   8/10/2021     Order Specific Question:   Reason for Exam:     Answer:   SCREENING   • Mammo Screening Digital Tomosynthesis Bilateral With CAD     Standing Status:   Future     Standing Expiration Date:   8/10/2021     Order Specific Question:   Reason for Exam:     Answer:   SCREENING   • Comprehensive Metabolic Panel   • Lipid Panel   • TSH   • Vitamin B12   • Vitamin D 25 Hydroxy   • Vitamin B1, Whole Blood   • Iron and TIBC   • Hepatitis C Antibody   • CBC & Differential     Order Specific Question:   Manual Differential     Answer:   No       Return in about 6 months (around 2/10/2021) for Recheck  BP.

## 2020-08-13 LAB
25(OH)D3+25(OH)D2 SERPL-MCNC: 48 NG/ML (ref 30–100)
ALBUMIN SERPL-MCNC: 4 G/DL (ref 3.8–4.8)
ALBUMIN/GLOB SERPL: 1.6 {RATIO} (ref 1.2–2.2)
ALP SERPL-CCNC: 94 IU/L (ref 39–117)
ALT SERPL-CCNC: 15 IU/L (ref 0–32)
AST SERPL-CCNC: 20 IU/L (ref 0–40)
BASOPHILS # BLD AUTO: 0.1 X10E3/UL (ref 0–0.2)
BASOPHILS NFR BLD AUTO: 1 %
BILIRUB SERPL-MCNC: 0.4 MG/DL (ref 0–1.2)
BUN SERPL-MCNC: 21 MG/DL (ref 8–27)
BUN/CREAT SERPL: 24 (ref 12–28)
CALCIUM SERPL-MCNC: 9 MG/DL (ref 8.7–10.3)
CHLORIDE SERPL-SCNC: 102 MMOL/L (ref 96–106)
CHOLEST SERPL-MCNC: 244 MG/DL (ref 100–199)
CO2 SERPL-SCNC: 27 MMOL/L (ref 20–29)
CREAT SERPL-MCNC: 0.87 MG/DL (ref 0.57–1)
EOSINOPHIL # BLD AUTO: 0.2 X10E3/UL (ref 0–0.4)
EOSINOPHIL NFR BLD AUTO: 2 %
ERYTHROCYTE [DISTWIDTH] IN BLOOD BY AUTOMATED COUNT: 12.6 % (ref 11.7–15.4)
GLOBULIN SER CALC-MCNC: 2.5 G/DL (ref 1.5–4.5)
GLUCOSE SERPL-MCNC: 91 MG/DL (ref 65–99)
HCT VFR BLD AUTO: 43 % (ref 34–46.6)
HDLC SERPL-MCNC: 71 MG/DL
HGB BLD-MCNC: 14.2 G/DL (ref 11.1–15.9)
IMM GRANULOCYTES # BLD AUTO: 0 X10E3/UL (ref 0–0.1)
IMM GRANULOCYTES NFR BLD AUTO: 0 %
IRON SATN MFR SERPL: 43 % (ref 15–55)
IRON SERPL-MCNC: 135 UG/DL (ref 27–139)
LDLC SERPL CALC-MCNC: 145 MG/DL (ref 0–99)
LYMPHOCYTES # BLD AUTO: 2.5 X10E3/UL (ref 0.7–3.1)
LYMPHOCYTES NFR BLD AUTO: 34 %
MCH RBC QN AUTO: 29.8 PG (ref 26.6–33)
MCHC RBC AUTO-ENTMCNC: 33 G/DL (ref 31.5–35.7)
MCV RBC AUTO: 90 FL (ref 79–97)
MONOCYTES # BLD AUTO: 0.9 X10E3/UL (ref 0.1–0.9)
MONOCYTES NFR BLD AUTO: 13 %
NEUTROPHILS # BLD AUTO: 3.7 X10E3/UL (ref 1.4–7)
NEUTROPHILS NFR BLD AUTO: 50 %
PLATELET # BLD AUTO: 299 X10E3/UL (ref 150–450)
POTASSIUM SERPL-SCNC: 4.4 MMOL/L (ref 3.5–5.2)
PROT SERPL-MCNC: 6.5 G/DL (ref 6–8.5)
RBC # BLD AUTO: 4.77 X10E6/UL (ref 3.77–5.28)
SODIUM SERPL-SCNC: 140 MMOL/L (ref 134–144)
TIBC SERPL-MCNC: 314 UG/DL (ref 250–450)
TRIGL SERPL-MCNC: 140 MG/DL (ref 0–149)
TSH SERPL DL<=0.005 MIU/L-ACNC: 1.47 UIU/ML (ref 0.45–4.5)
UIBC SERPL-MCNC: 179 UG/DL (ref 118–369)
VIT B1 BLD-SCNC: 178.5 NMOL/L (ref 66.5–200)
VIT B12 SERPL-MCNC: 587 PG/ML (ref 232–1245)
VLDLC SERPL CALC-MCNC: 28 MG/DL (ref 5–40)
WBC # BLD AUTO: 7.5 X10E3/UL (ref 3.4–10.8)

## 2020-08-17 RX ORDER — THIAMINE HYDROCHLORIDE 100 MG/ML
200 INJECTION, SOLUTION INTRAMUSCULAR; INTRAVENOUS
Qty: 2 ML | Refills: 2 | Status: SHIPPED | OUTPATIENT
Start: 2020-08-17 | End: 2021-01-18 | Stop reason: SDUPTHER

## 2020-08-19 LAB
HCV AB S/CO SERPL IA: <0.1 S/CO RATIO (ref 0–0.9)
WRITTEN AUTHORIZATION: NORMAL

## 2020-08-20 ENCOUNTER — HOSPITAL ENCOUNTER (OUTPATIENT)
Dept: INFUSION THERAPY | Facility: HOSPITAL | Age: 70
Discharge: HOME | End: 2020-08-20
Admitting: ALLERGY & IMMUNOLOGY

## 2020-08-20 VITALS
TEMPERATURE: 97.8 F | RESPIRATION RATE: 20 BRPM | SYSTOLIC BLOOD PRESSURE: 132 MMHG | HEART RATE: 70 BPM | DIASTOLIC BLOOD PRESSURE: 83 MMHG | OXYGEN SATURATION: 96 %

## 2020-08-20 DIAGNOSIS — J45.40 MODERATE PERSISTENT ASTHMA WITHOUT COMPLICATION: Primary | ICD-10-CM

## 2020-08-20 PROCEDURE — 25010000002 OMALIZUMAB PER 5 MG: Performed by: ALLERGY & IMMUNOLOGY

## 2020-08-20 PROCEDURE — 96372 THER/PROPH/DIAG INJ SC/IM: CPT

## 2020-08-20 RX ADMIN — OMALIZUMAB 150 MG: 202.5 INJECTION, SOLUTION SUBCUTANEOUS at 10:10

## 2020-08-20 NOTE — PROGRESS NOTES
Tolerated injection with no S/S of reaction or patient complaints.  Injection site x 1 with band aide applied.  Pt monitored for 30 minutes post injection without incident.  AVS declined and pt DC per ambulation after completion of visit.

## 2020-09-14 ENCOUNTER — CLINICAL SUPPORT (OUTPATIENT)
Dept: FAMILY MEDICINE CLINIC | Facility: CLINIC | Age: 70
End: 2020-09-14

## 2020-09-14 DIAGNOSIS — E53.8 B12 DEFICIENCY: Primary | ICD-10-CM

## 2020-09-14 PROCEDURE — 96372 THER/PROPH/DIAG INJ SC/IM: CPT | Performed by: FAMILY MEDICINE

## 2020-09-14 RX ORDER — CYANOCOBALAMIN 1000 UG/ML
1000 INJECTION, SOLUTION INTRAMUSCULAR; SUBCUTANEOUS
Status: DISCONTINUED | OUTPATIENT
Start: 2020-09-14 | End: 2020-09-14

## 2020-09-14 RX ADMIN — THIAMINE HYDROCHLORIDE 200 MG: 100 INJECTION, SOLUTION INTRAMUSCULAR; INTRAVENOUS at 10:32

## 2020-09-14 RX ADMIN — CYANOCOBALAMIN 1000 MCG: 1000 INJECTION, SOLUTION INTRAMUSCULAR; SUBCUTANEOUS at 10:32

## 2020-09-17 ENCOUNTER — HOSPITAL ENCOUNTER (OUTPATIENT)
Dept: INFUSION THERAPY | Facility: HOSPITAL | Age: 70
Discharge: HOME | End: 2020-09-17
Admitting: ALLERGY & IMMUNOLOGY

## 2020-09-17 VITALS
OXYGEN SATURATION: 94 % | HEART RATE: 69 BPM | SYSTOLIC BLOOD PRESSURE: 109 MMHG | WEIGHT: 210 LBS | BODY MASS INDEX: 33.75 KG/M2 | HEIGHT: 66 IN | DIASTOLIC BLOOD PRESSURE: 68 MMHG | TEMPERATURE: 97.7 F | RESPIRATION RATE: 20 BRPM

## 2020-09-17 DIAGNOSIS — J45.40 MODERATE PERSISTENT ASTHMA WITHOUT COMPLICATION: Primary | ICD-10-CM

## 2020-09-17 PROCEDURE — 25010000002 OMALIZUMAB PER 5 MG: Performed by: ALLERGY & IMMUNOLOGY

## 2020-09-17 PROCEDURE — 96372 THER/PROPH/DIAG INJ SC/IM: CPT

## 2020-09-17 RX ADMIN — OMALIZUMAB 150 MG: 202.5 INJECTION, SOLUTION SUBCUTANEOUS at 10:04

## 2020-10-08 RX ORDER — HYDROCHLOROTHIAZIDE 12.5 MG/1
TABLET ORAL
Qty: 90 TABLET | Refills: 0 | Status: SHIPPED | OUTPATIENT
Start: 2020-10-08 | End: 2020-11-24

## 2020-10-15 ENCOUNTER — HOSPITAL ENCOUNTER (OUTPATIENT)
Dept: INFUSION THERAPY | Facility: HOSPITAL | Age: 70
Discharge: HOME | End: 2020-10-15
Admitting: ALLERGY & IMMUNOLOGY

## 2020-10-15 VITALS
DIASTOLIC BLOOD PRESSURE: 79 MMHG | HEART RATE: 74 BPM | TEMPERATURE: 97.7 F | RESPIRATION RATE: 20 BRPM | SYSTOLIC BLOOD PRESSURE: 128 MMHG | OXYGEN SATURATION: 94 %

## 2020-10-15 DIAGNOSIS — J45.40 MODERATE PERSISTENT ASTHMA WITHOUT COMPLICATION: Primary | ICD-10-CM

## 2020-10-15 PROCEDURE — 96372 THER/PROPH/DIAG INJ SC/IM: CPT

## 2020-10-15 PROCEDURE — 25010000002 OMALIZUMAB PER 5 MG: Performed by: ALLERGY & IMMUNOLOGY

## 2020-10-15 RX ADMIN — OMALIZUMAB 150 MG: 202.5 INJECTION, SOLUTION SUBCUTANEOUS at 09:36

## 2020-10-15 NOTE — PROGRESS NOTES
Tolerated injection with no S/S of reaction or patient complaints.  Injection site x 1 with band aide applied.  Pt monitored for 30 minutes post injection without incident.  AVS declined & pt DC per ambulation after completion of visit.

## 2020-10-28 ENCOUNTER — HOSPITAL ENCOUNTER (OUTPATIENT)
Dept: BONE DENSITY | Facility: HOSPITAL | Age: 70
Discharge: HOME | End: 2020-10-28

## 2020-10-28 ENCOUNTER — HOSPITAL ENCOUNTER (OUTPATIENT)
Dept: MAMMOGRAPHY | Facility: HOSPITAL | Age: 70
Discharge: HOME | End: 2020-10-28

## 2020-10-28 DIAGNOSIS — Z12.31 VISIT FOR SCREENING MAMMOGRAM: ICD-10-CM

## 2020-10-28 DIAGNOSIS — Z78.0 MENOPAUSE: ICD-10-CM

## 2020-10-28 PROCEDURE — 77080 DXA BONE DENSITY AXIAL: CPT

## 2020-10-28 PROCEDURE — 77063 BREAST TOMOSYNTHESIS BI: CPT

## 2020-10-28 PROCEDURE — 77067 SCR MAMMO BI INCL CAD: CPT

## 2020-11-12 ENCOUNTER — HOSPITAL ENCOUNTER (OUTPATIENT)
Dept: INFUSION THERAPY | Facility: HOSPITAL | Age: 70
Discharge: HOME | End: 2020-11-12
Admitting: ALLERGY & IMMUNOLOGY

## 2020-11-12 VITALS
TEMPERATURE: 97.6 F | DIASTOLIC BLOOD PRESSURE: 72 MMHG | HEART RATE: 69 BPM | RESPIRATION RATE: 22 BRPM | SYSTOLIC BLOOD PRESSURE: 119 MMHG | OXYGEN SATURATION: 94 %

## 2020-11-12 DIAGNOSIS — J45.40 MODERATE PERSISTENT ASTHMA WITHOUT COMPLICATION: Primary | ICD-10-CM

## 2020-11-12 PROCEDURE — 96372 THER/PROPH/DIAG INJ SC/IM: CPT

## 2020-11-12 PROCEDURE — 25010000002 OMALIZUMAB PER 5 MG: Performed by: ALLERGY & IMMUNOLOGY

## 2020-11-12 RX ADMIN — OMALIZUMAB 150 MG: 202.5 INJECTION, SOLUTION SUBCUTANEOUS at 10:02

## 2020-11-24 RX ORDER — HYDROCHLOROTHIAZIDE 12.5 MG/1
TABLET ORAL
Qty: 90 TABLET | Refills: 0 | Status: SHIPPED | OUTPATIENT
Start: 2020-11-24 | End: 2021-04-15

## 2020-12-10 ENCOUNTER — HOSPITAL ENCOUNTER (OUTPATIENT)
Dept: INFUSION THERAPY | Facility: HOSPITAL | Age: 70
Discharge: HOME | End: 2020-12-10
Admitting: ALLERGY & IMMUNOLOGY

## 2020-12-10 VITALS
RESPIRATION RATE: 20 BRPM | OXYGEN SATURATION: 96 % | TEMPERATURE: 97.9 F | DIASTOLIC BLOOD PRESSURE: 97 MMHG | HEART RATE: 83 BPM | SYSTOLIC BLOOD PRESSURE: 159 MMHG

## 2020-12-10 DIAGNOSIS — J45.40 MODERATE PERSISTENT ASTHMA WITHOUT COMPLICATION: Primary | ICD-10-CM

## 2020-12-10 PROCEDURE — 25010000002 OMALIZUMAB PER 5 MG: Performed by: ALLERGY & IMMUNOLOGY

## 2020-12-10 PROCEDURE — 96372 THER/PROPH/DIAG INJ SC/IM: CPT

## 2020-12-10 RX ADMIN — OMALIZUMAB 150 MG: 202.5 INJECTION, SOLUTION SUBCUTANEOUS at 10:12

## 2021-01-07 ENCOUNTER — CLINICAL SUPPORT (OUTPATIENT)
Dept: FAMILY MEDICINE CLINIC | Facility: CLINIC | Age: 71
End: 2021-01-07

## 2021-01-07 ENCOUNTER — HOSPITAL ENCOUNTER (OUTPATIENT)
Dept: INFUSION THERAPY | Facility: HOSPITAL | Age: 71
Discharge: HOME | End: 2021-01-07
Admitting: ALLERGY & IMMUNOLOGY

## 2021-01-07 ENCOUNTER — TELEPHONE (OUTPATIENT)
Dept: FAMILY MEDICINE CLINIC | Facility: CLINIC | Age: 71
End: 2021-01-07

## 2021-01-07 VITALS
HEART RATE: 84 BPM | DIASTOLIC BLOOD PRESSURE: 82 MMHG | TEMPERATURE: 98.2 F | OXYGEN SATURATION: 95 % | RESPIRATION RATE: 20 BRPM | SYSTOLIC BLOOD PRESSURE: 135 MMHG

## 2021-01-07 VITALS — TEMPERATURE: 97.8 F

## 2021-01-07 DIAGNOSIS — J45.40 MODERATE PERSISTENT ASTHMA, UNSPECIFIED WHETHER COMPLICATED: Primary | ICD-10-CM

## 2021-01-07 DIAGNOSIS — E51.9 THIAMINE DEFICIENCY: ICD-10-CM

## 2021-01-07 PROCEDURE — 96372 THER/PROPH/DIAG INJ SC/IM: CPT

## 2021-01-07 PROCEDURE — 96372 THER/PROPH/DIAG INJ SC/IM: CPT | Performed by: FAMILY MEDICINE

## 2021-01-07 PROCEDURE — 25010000002 OMALIZUMAB PER 5 MG: Performed by: ALLERGY & IMMUNOLOGY

## 2021-01-07 RX ADMIN — OMALIZUMAB 150 MG: 202.5 INJECTION, SOLUTION SUBCUTANEOUS at 09:23

## 2021-01-07 RX ADMIN — THIAMINE HYDROCHLORIDE 200 MG: 100 INJECTION, SOLUTION INTRAMUSCULAR; INTRAVENOUS at 12:16

## 2021-01-07 NOTE — TELEPHONE ENCOUNTER
PATIENT STATES: that she would like to have a call back about the covid vaccine.      PATIENT CAN BE REACHED ON:[phone] or [phone] (eonk)

## 2021-01-07 NOTE — PROGRESS NOTES
Patient tolerated injection without difficulty. No s/s of reaction noted. Patient discharged per ambulation.    
Walk in

## 2021-01-18 RX ORDER — THIAMINE HYDROCHLORIDE 100 MG/ML
200 INJECTION, SOLUTION INTRAMUSCULAR; INTRAVENOUS
Qty: 2 ML | Refills: 2 | Status: SHIPPED | OUTPATIENT
Start: 2021-01-18 | End: 2021-01-28 | Stop reason: SDUPTHER

## 2021-01-28 ENCOUNTER — TELEPHONE (OUTPATIENT)
Dept: FAMILY MEDICINE CLINIC | Facility: CLINIC | Age: 71
End: 2021-01-28

## 2021-01-28 DIAGNOSIS — F32.A DEPRESSION, UNSPECIFIED DEPRESSION TYPE: Primary | ICD-10-CM

## 2021-01-28 DIAGNOSIS — Z98.84 S/P GASTRIC BYPASS: ICD-10-CM

## 2021-01-28 DIAGNOSIS — K21.9 GASTROESOPHAGEAL REFLUX DISEASE WITHOUT ESOPHAGITIS: ICD-10-CM

## 2021-01-28 DIAGNOSIS — M15.9 PRIMARY OSTEOARTHRITIS INVOLVING MULTIPLE JOINTS: ICD-10-CM

## 2021-01-28 RX ORDER — CELECOXIB 200 MG/1
200 CAPSULE ORAL DAILY
Qty: 90 CAPSULE | Refills: 0 | Status: SHIPPED | OUTPATIENT
Start: 2021-01-28 | End: 2021-05-12

## 2021-01-28 RX ORDER — ESCITALOPRAM OXALATE 20 MG/1
20 TABLET ORAL DAILY
Qty: 90 TABLET | Refills: 0 | Status: SHIPPED | OUTPATIENT
Start: 2021-01-28 | End: 2021-05-12

## 2021-01-28 RX ORDER — CYANOCOBALAMIN 1000 UG/ML
1000 INJECTION, SOLUTION INTRAMUSCULAR; SUBCUTANEOUS
Qty: 3 ML | Refills: 0 | Status: SHIPPED | OUTPATIENT
Start: 2021-01-28 | End: 2021-07-14

## 2021-01-28 RX ORDER — THIAMINE HYDROCHLORIDE 100 MG/ML
200 INJECTION, SOLUTION INTRAMUSCULAR; INTRAVENOUS
Qty: 6 ML | Refills: 0 | Status: SHIPPED | OUTPATIENT
Start: 2021-01-28 | End: 2022-08-17 | Stop reason: SDUPTHER

## 2021-01-28 RX ORDER — POTASSIUM CHLORIDE 20 MEQ/1
20 TABLET, EXTENDED RELEASE ORAL 2 TIMES DAILY
Qty: 180 TABLET | Refills: 0 | Status: SHIPPED | OUTPATIENT
Start: 2021-01-28 | End: 2021-05-12

## 2021-01-28 RX ORDER — ESOMEPRAZOLE MAGNESIUM 40 MG/1
40 CAPSULE, DELAYED RELEASE ORAL 2 TIMES DAILY
Qty: 180 CAPSULE | Refills: 0 | Status: SHIPPED | OUTPATIENT
Start: 2021-01-28 | End: 2021-05-12

## 2021-01-28 NOTE — TELEPHONE ENCOUNTER
PATIENT AND I HAVE BOTH CALLED [employer] [facility] TO GET REFILL REQUESTS SENT AND HAVE BEEN UNSUCCESSFUL.  SHE IS GOING OUT OF TOWN SO I'M TRYING TO HELP GET THIS TAKEN CARE OF.  SHE HAS AN APPT SCHEDULED FOR 3/16/2021.  SHE IS NEEDING:  CELECOXIB  ESCITALOPRAM  ESOMEPRAZOLE  POTASSIUM CHLORIDE  CYANOCOBALAMIN  THIAMINE

## 2021-02-04 ENCOUNTER — HOSPITAL ENCOUNTER (OUTPATIENT)
Dept: INFUSION THERAPY | Facility: HOSPITAL | Age: 71
Discharge: HOME | End: 2021-02-04
Admitting: ALLERGY & IMMUNOLOGY

## 2021-02-04 VITALS
OXYGEN SATURATION: 95 % | TEMPERATURE: 97.3 F | SYSTOLIC BLOOD PRESSURE: 124 MMHG | DIASTOLIC BLOOD PRESSURE: 78 MMHG | RESPIRATION RATE: 20 BRPM | HEART RATE: 77 BPM

## 2021-02-04 DIAGNOSIS — J45.40 MODERATE PERSISTENT ASTHMA WITHOUT COMPLICATION: Primary | ICD-10-CM

## 2021-02-04 PROCEDURE — 25010000002 OMALIZUMAB PER 5 MG: Performed by: ALLERGY & IMMUNOLOGY

## 2021-02-04 PROCEDURE — 96372 THER/PROPH/DIAG INJ SC/IM: CPT

## 2021-02-04 RX ADMIN — OMALIZUMAB 150 MG: 202.5 INJECTION, SOLUTION SUBCUTANEOUS at 10:17

## 2021-02-25 DIAGNOSIS — E03.9 HYPOTHYROIDISM, UNSPECIFIED TYPE: ICD-10-CM

## 2021-02-26 RX ORDER — LEVOTHYROXINE SODIUM 0.05 MG/1
TABLET ORAL
Qty: 90 TABLET | Refills: 0 | Status: SHIPPED | OUTPATIENT
Start: 2021-02-26 | End: 2021-05-25

## 2021-03-11 ENCOUNTER — HOSPITAL ENCOUNTER (OUTPATIENT)
Dept: INFUSION THERAPY | Facility: HOSPITAL | Age: 71
Discharge: HOME | End: 2021-03-11
Admitting: ALLERGY & IMMUNOLOGY

## 2021-03-11 VITALS
SYSTOLIC BLOOD PRESSURE: 123 MMHG | DIASTOLIC BLOOD PRESSURE: 77 MMHG | OXYGEN SATURATION: 96 % | TEMPERATURE: 97.3 F | HEART RATE: 70 BPM | RESPIRATION RATE: 18 BRPM

## 2021-03-11 DIAGNOSIS — J45.40 MODERATE PERSISTENT ASTHMA WITHOUT COMPLICATION: Primary | ICD-10-CM

## 2021-03-11 PROCEDURE — 25010000002 OMALIZUMAB PER 5 MG: Performed by: ALLERGY & IMMUNOLOGY

## 2021-03-11 PROCEDURE — 96372 THER/PROPH/DIAG INJ SC/IM: CPT

## 2021-03-11 RX ADMIN — OMALIZUMAB 150 MG: 202.5 INJECTION, SOLUTION SUBCUTANEOUS at 10:16

## 2021-03-16 ENCOUNTER — OFFICE VISIT (OUTPATIENT)
Dept: FAMILY MEDICINE CLINIC | Facility: CLINIC | Age: 71
End: 2021-03-16

## 2021-03-16 VITALS
HEIGHT: 64 IN | WEIGHT: 216.2 LBS | HEART RATE: 86 BPM | OXYGEN SATURATION: 98 % | BODY MASS INDEX: 36.91 KG/M2 | TEMPERATURE: 97.5 F | SYSTOLIC BLOOD PRESSURE: 128 MMHG | DIASTOLIC BLOOD PRESSURE: 86 MMHG

## 2021-03-16 DIAGNOSIS — F32.A DEPRESSION, UNSPECIFIED DEPRESSION TYPE: Chronic | ICD-10-CM

## 2021-03-16 DIAGNOSIS — E78.5 HYPERLIPIDEMIA, UNSPECIFIED HYPERLIPIDEMIA TYPE: Chronic | ICD-10-CM

## 2021-03-16 DIAGNOSIS — E51.9 THIAMINE DEFICIENCY: Chronic | ICD-10-CM

## 2021-03-16 DIAGNOSIS — M79.10 MYALGIA: Chronic | ICD-10-CM

## 2021-03-16 DIAGNOSIS — E03.9 HYPOTHYROIDISM, UNSPECIFIED TYPE: Chronic | ICD-10-CM

## 2021-03-16 DIAGNOSIS — J45.40 MODERATE PERSISTENT ASTHMA, UNSPECIFIED WHETHER COMPLICATED: Chronic | ICD-10-CM

## 2021-03-16 DIAGNOSIS — I10 ESSENTIAL HYPERTENSION: Primary | Chronic | ICD-10-CM

## 2021-03-16 DIAGNOSIS — Z98.84 S/P GASTRIC BYPASS: Chronic | ICD-10-CM

## 2021-03-16 DIAGNOSIS — K21.9 GASTROESOPHAGEAL REFLUX DISEASE WITHOUT ESOPHAGITIS: Chronic | ICD-10-CM

## 2021-03-16 DIAGNOSIS — E61.1 IRON DEFICIENCY: Chronic | ICD-10-CM

## 2021-03-16 DIAGNOSIS — E53.8 VITAMIN B12 DEFICIENCY: Chronic | ICD-10-CM

## 2021-03-16 DIAGNOSIS — M15.9 PRIMARY OSTEOARTHRITIS INVOLVING MULTIPLE JOINTS: Chronic | ICD-10-CM

## 2021-03-16 PROCEDURE — 96372 THER/PROPH/DIAG INJ SC/IM: CPT | Performed by: FAMILY MEDICINE

## 2021-03-16 PROCEDURE — 99214 OFFICE O/P EST MOD 30 MIN: CPT | Performed by: FAMILY MEDICINE

## 2021-03-16 RX ADMIN — CYANOCOBALAMIN 1000 MCG: 1000 INJECTION, SOLUTION INTRAMUSCULAR; SUBCUTANEOUS at 15:15

## 2021-03-16 NOTE — PROGRESS NOTES
"Chief Complaint  Hypertension    Subjective          Octavia Pendleton presents to [facility]  Patient presents for follow-up of her hypertension and malabsorptive syndrome status post bariatric surgery.  Her depression has been under control.  She is compliant with her medications.  Her asthma has been doing well.  She does complain of increasing leg aches and cramps below the knees when she is at rest.    She does not notice them when she is up and moving around.  They do interfere with her sleep.            Objective   Vital Signs:   /86   Pulse 86   Temp 97.5 °F (36.4 °C)   Ht 162.6 cm (64\")   Wt 98.1 kg (216 lb 3.2 oz)   SpO2 98%   BMI 37.11 kg/m²     Physical Exam  Constitutional:       General: She is not in acute distress.     Appearance: Normal appearance. She is well-developed. She is obese.   HENT:      Head: Normocephalic and atraumatic.      Right Ear: Tympanic membrane, ear canal and external ear normal.      Left Ear: Tympanic membrane, ear canal and external ear normal.      Mouth/Throat:      Mouth: Mucous membranes are moist.      Pharynx: Oropharynx is clear.   Eyes:      Conjunctiva/sclera: Conjunctivae normal.      Pupils: Pupils are equal, round, and reactive to light.   Neck:      Thyroid: No thyromegaly.   Cardiovascular:      Rate and Rhythm: Normal rate and regular rhythm.      Heart sounds: No murmur.   Pulmonary:      Effort: Pulmonary effort is normal.      Breath sounds: Normal breath sounds. No wheezing.   Abdominal:      General: Bowel sounds are normal.      Palpations: Abdomen is soft.      Tenderness: There is no abdominal tenderness.   Musculoskeletal:         General: Normal range of motion.      Cervical back: Neck supple.   Lymphadenopathy:      Cervical: No cervical adenopathy.   Skin:     General: Skin is warm and dry.   Neurological:      Mental Status: She is alert and oriented to person, place, and time.   Psychiatric:         " Mood and Affect: Mood normal.         Behavior: Behavior normal.        Result Review :                 Assessment and Plan    Diagnoses and all orders for this visit:    1. Essential hypertension (Primary)  -     Comprehensive Metabolic Panel  -     CBC & Differential    2. Hypothyroidism, unspecified type  -     TSH    3. Hyperlipidemia, unspecified hyperlipidemia type    4. Depression, unspecified depression type    5. Gastroesophageal reflux disease without esophagitis    6. S/P gastric bypass    7. Vitamin B12 deficiency  -     Vitamin B12    8. Thiamine deficiency  -     Vitamin B1, Whole Blood    9. Iron deficiency    10. Primary osteoarthritis involving multiple joints    11. Moderate persistent asthma, unspecified whether complicated    12. Myalgia  -     Magnesium  -     C-reactive Protein  -     CK        Follow Up   Return in about 6 months (around 9/16/2021) for Annual physical.  Patient was given instructions and counseling regarding her condition or for health maintenance advice. Please see specific information pulled into the AVS if appropriate.       Answers for HPI/ROS submitted by the patient on 3/16/2021  Please describe your symptoms.: Follow up.  Leg pain.  Bart check  Have you had these symptoms before?: Yes  How long have you been having these symptoms?: Greater than 2 weeks  Please list any medications you are currently taking for this condition.: Extra strength Tylenol  Please describe any probable cause for these symptoms. : Unknown  What is the primary reason for your visit?: Other

## 2021-03-20 LAB
ALBUMIN SERPL-MCNC: 4.2 G/DL (ref 3.8–4.8)
ALBUMIN/GLOB SERPL: 1.6 {RATIO} (ref 1.2–2.2)
ALP SERPL-CCNC: 86 IU/L (ref 39–117)
ALT SERPL-CCNC: 15 IU/L (ref 0–32)
AST SERPL-CCNC: 24 IU/L (ref 0–40)
BASOPHILS # BLD AUTO: 0.1 X10E3/UL (ref 0–0.2)
BASOPHILS NFR BLD AUTO: 1 %
BILIRUB SERPL-MCNC: 0.4 MG/DL (ref 0–1.2)
BUN SERPL-MCNC: 20 MG/DL (ref 8–27)
BUN/CREAT SERPL: 26 (ref 12–28)
CALCIUM SERPL-MCNC: 9.4 MG/DL (ref 8.7–10.3)
CHLORIDE SERPL-SCNC: 103 MMOL/L (ref 96–106)
CK SERPL-CCNC: 106 U/L (ref 32–182)
CO2 SERPL-SCNC: 24 MMOL/L (ref 20–29)
CREAT SERPL-MCNC: 0.78 MG/DL (ref 0.57–1)
CRP SERPL-MCNC: 2 MG/L (ref 0–10)
EOSINOPHIL # BLD AUTO: 0.2 X10E3/UL (ref 0–0.4)
EOSINOPHIL NFR BLD AUTO: 3 %
ERYTHROCYTE [DISTWIDTH] IN BLOOD BY AUTOMATED COUNT: 12.7 % (ref 11.7–15.4)
GLOBULIN SER CALC-MCNC: 2.6 G/DL (ref 1.5–4.5)
GLUCOSE SERPL-MCNC: 87 MG/DL (ref 65–99)
HCT VFR BLD AUTO: 42 % (ref 34–46.6)
HGB BLD-MCNC: 14.2 G/DL (ref 11.1–15.9)
IMM GRANULOCYTES # BLD AUTO: 0 X10E3/UL (ref 0–0.1)
IMM GRANULOCYTES NFR BLD AUTO: 0 %
LYMPHOCYTES # BLD AUTO: 3.2 X10E3/UL (ref 0.7–3.1)
LYMPHOCYTES NFR BLD AUTO: 39 %
MAGNESIUM SERPL-MCNC: 2.2 MG/DL (ref 1.6–2.3)
MCH RBC QN AUTO: 29.8 PG (ref 26.6–33)
MCHC RBC AUTO-ENTMCNC: 33.8 G/DL (ref 31.5–35.7)
MCV RBC AUTO: 88 FL (ref 79–97)
MONOCYTES # BLD AUTO: 0.9 X10E3/UL (ref 0.1–0.9)
MONOCYTES NFR BLD AUTO: 10 %
NEUTROPHILS # BLD AUTO: 3.9 X10E3/UL (ref 1.4–7)
NEUTROPHILS NFR BLD AUTO: 47 %
PLATELET # BLD AUTO: 285 X10E3/UL (ref 150–450)
POTASSIUM SERPL-SCNC: 4.3 MMOL/L (ref 3.5–5.2)
PROT SERPL-MCNC: 6.8 G/DL (ref 6–8.5)
RBC # BLD AUTO: 4.77 X10E6/UL (ref 3.77–5.28)
SODIUM SERPL-SCNC: 140 MMOL/L (ref 134–144)
TSH SERPL DL<=0.005 MIU/L-ACNC: 0.67 UIU/ML (ref 0.45–4.5)
VIT B1 BLD-SCNC: 157.9 NMOL/L (ref 66.5–200)
VIT B12 SERPL-MCNC: >2000 PG/ML (ref 232–1245)
WBC # BLD AUTO: 8.3 X10E3/UL (ref 3.4–10.8)

## 2021-04-08 ENCOUNTER — HOSPITAL ENCOUNTER (OUTPATIENT)
Dept: INFUSION THERAPY | Facility: HOSPITAL | Age: 71
Discharge: HOME | End: 2021-04-08
Admitting: ALLERGY & IMMUNOLOGY

## 2021-04-08 VITALS
HEART RATE: 71 BPM | SYSTOLIC BLOOD PRESSURE: 128 MMHG | DIASTOLIC BLOOD PRESSURE: 78 MMHG | RESPIRATION RATE: 18 BRPM | OXYGEN SATURATION: 91 % | TEMPERATURE: 96.9 F

## 2021-04-08 DIAGNOSIS — J45.40 MODERATE PERSISTENT ASTHMA WITHOUT COMPLICATION: Primary | ICD-10-CM

## 2021-04-08 PROCEDURE — 25010000002 OMALIZUMAB PER 5 MG: Performed by: ALLERGY & IMMUNOLOGY

## 2021-04-08 PROCEDURE — 96372 THER/PROPH/DIAG INJ SC/IM: CPT

## 2021-04-08 RX ADMIN — OMALIZUMAB 150 MG: 202.5 INJECTION, SOLUTION SUBCUTANEOUS at 10:13

## 2021-04-08 NOTE — PROGRESS NOTES
Pt tolerated injections well.  Pt monitored for 30 minutes post infusion without complication.  Pt dc'ed ambulatory.

## 2021-04-15 RX ORDER — HYDROCHLOROTHIAZIDE 12.5 MG/1
TABLET ORAL
Qty: 90 TABLET | Refills: 0 | Status: SHIPPED | OUTPATIENT
Start: 2021-04-15 | End: 2021-05-25

## 2021-05-06 ENCOUNTER — HOSPITAL ENCOUNTER (OUTPATIENT)
Dept: INFUSION THERAPY | Facility: HOSPITAL | Age: 71
Discharge: HOME | End: 2021-05-06
Admitting: ALLERGY & IMMUNOLOGY

## 2021-05-06 VITALS
DIASTOLIC BLOOD PRESSURE: 71 MMHG | HEART RATE: 74 BPM | SYSTOLIC BLOOD PRESSURE: 119 MMHG | OXYGEN SATURATION: 95 % | TEMPERATURE: 97.3 F | RESPIRATION RATE: 20 BRPM

## 2021-05-06 DIAGNOSIS — J45.40 MODERATE PERSISTENT ASTHMA WITHOUT COMPLICATION: Primary | ICD-10-CM

## 2021-05-06 PROCEDURE — 25010000002 OMALIZUMAB PER 5 MG: Performed by: ALLERGY & IMMUNOLOGY

## 2021-05-06 PROCEDURE — 96372 THER/PROPH/DIAG INJ SC/IM: CPT

## 2021-05-06 RX ADMIN — OMALIZUMAB 150 MG: 202.5 INJECTION, SOLUTION SUBCUTANEOUS at 09:59

## 2021-05-12 DIAGNOSIS — Z98.84 S/P GASTRIC BYPASS: ICD-10-CM

## 2021-05-12 DIAGNOSIS — F32.A DEPRESSION, UNSPECIFIED DEPRESSION TYPE: ICD-10-CM

## 2021-05-12 DIAGNOSIS — M15.9 PRIMARY OSTEOARTHRITIS INVOLVING MULTIPLE JOINTS: ICD-10-CM

## 2021-05-12 DIAGNOSIS — K21.9 GASTROESOPHAGEAL REFLUX DISEASE WITHOUT ESOPHAGITIS: ICD-10-CM

## 2021-05-12 RX ORDER — ESCITALOPRAM OXALATE 20 MG/1
TABLET ORAL
Qty: 90 TABLET | Refills: 1 | Status: SHIPPED | OUTPATIENT
Start: 2021-05-12 | End: 2021-09-17 | Stop reason: SDUPTHER

## 2021-05-12 RX ORDER — ESOMEPRAZOLE MAGNESIUM 40 MG/1
CAPSULE, DELAYED RELEASE ORAL
Qty: 180 CAPSULE | Refills: 1 | Status: SHIPPED | OUTPATIENT
Start: 2021-05-12 | End: 2021-09-17 | Stop reason: SDUPTHER

## 2021-05-12 RX ORDER — CELECOXIB 200 MG/1
CAPSULE ORAL
Qty: 90 CAPSULE | Refills: 1 | Status: SHIPPED | OUTPATIENT
Start: 2021-05-12 | End: 2021-09-17 | Stop reason: SDUPTHER

## 2021-05-12 RX ORDER — POTASSIUM CHLORIDE 1500 MG/1
TABLET, EXTENDED RELEASE ORAL
Qty: 180 TABLET | Refills: 1 | Status: SHIPPED | OUTPATIENT
Start: 2021-05-12 | End: 2021-09-17 | Stop reason: SDUPTHER

## 2021-05-25 DIAGNOSIS — E03.9 HYPOTHYROIDISM, UNSPECIFIED TYPE: ICD-10-CM

## 2021-05-25 RX ORDER — HYDROCHLOROTHIAZIDE 12.5 MG/1
TABLET ORAL
Qty: 90 TABLET | Refills: 0 | Status: SHIPPED | OUTPATIENT
Start: 2021-05-25 | End: 2021-09-17 | Stop reason: SDUPTHER

## 2021-05-25 RX ORDER — LEVOTHYROXINE SODIUM 0.05 MG/1
TABLET ORAL
Qty: 90 TABLET | Refills: 0 | Status: SHIPPED | OUTPATIENT
Start: 2021-05-25 | End: 2021-09-17 | Stop reason: SDUPTHER

## 2021-06-03 ENCOUNTER — HOSPITAL ENCOUNTER (OUTPATIENT)
Dept: INFUSION THERAPY | Facility: HOSPITAL | Age: 71
Discharge: HOME | End: 2021-06-03
Admitting: ALLERGY & IMMUNOLOGY

## 2021-06-03 VITALS
TEMPERATURE: 97.1 F | DIASTOLIC BLOOD PRESSURE: 73 MMHG | SYSTOLIC BLOOD PRESSURE: 117 MMHG | HEART RATE: 67 BPM | RESPIRATION RATE: 20 BRPM | OXYGEN SATURATION: 94 %

## 2021-06-03 DIAGNOSIS — J45.40 MODERATE PERSISTENT ASTHMA WITHOUT COMPLICATION: Primary | ICD-10-CM

## 2021-06-03 PROCEDURE — 96372 THER/PROPH/DIAG INJ SC/IM: CPT

## 2021-06-03 PROCEDURE — 25010000002 OMALIZUMAB PER 5 MG: Performed by: ALLERGY & IMMUNOLOGY

## 2021-06-03 RX ADMIN — OMALIZUMAB 150 MG: 202.5 INJECTION, SOLUTION SUBCUTANEOUS at 10:01

## 2021-07-01 ENCOUNTER — HOSPITAL ENCOUNTER (OUTPATIENT)
Dept: INFUSION THERAPY | Facility: HOSPITAL | Age: 71
Discharge: HOME | End: 2021-07-01
Admitting: ALLERGY & IMMUNOLOGY

## 2021-07-01 VITALS
OXYGEN SATURATION: 96 % | RESPIRATION RATE: 20 BRPM | DIASTOLIC BLOOD PRESSURE: 81 MMHG | HEART RATE: 67 BPM | TEMPERATURE: 96.9 F | SYSTOLIC BLOOD PRESSURE: 134 MMHG

## 2021-07-01 DIAGNOSIS — J45.40 MODERATE PERSISTENT ASTHMA WITHOUT COMPLICATION: Primary | ICD-10-CM

## 2021-07-01 PROCEDURE — 96372 THER/PROPH/DIAG INJ SC/IM: CPT

## 2021-07-01 PROCEDURE — 25010000002 OMALIZUMAB PER 5 MG: Performed by: ALLERGY & IMMUNOLOGY

## 2021-07-01 RX ADMIN — OMALIZUMAB 150 MG: 202.5 INJECTION, SOLUTION SUBCUTANEOUS at 09:23

## 2021-07-14 DIAGNOSIS — Z98.84 S/P GASTRIC BYPASS: ICD-10-CM

## 2021-07-14 RX ORDER — CYANOCOBALAMIN 1000 UG/ML
INJECTION, SOLUTION INTRAMUSCULAR; SUBCUTANEOUS
Qty: 3 ML | Refills: 0 | Status: SHIPPED | OUTPATIENT
Start: 2021-07-14 | End: 2022-06-06

## 2021-07-29 ENCOUNTER — HOSPITAL ENCOUNTER (OUTPATIENT)
Dept: INFUSION THERAPY | Facility: HOSPITAL | Age: 71
Discharge: HOME | End: 2021-07-29
Admitting: ALLERGY & IMMUNOLOGY

## 2021-07-29 VITALS
TEMPERATURE: 96.8 F | SYSTOLIC BLOOD PRESSURE: 115 MMHG | OXYGEN SATURATION: 96 % | RESPIRATION RATE: 20 BRPM | DIASTOLIC BLOOD PRESSURE: 69 MMHG | HEART RATE: 67 BPM

## 2021-07-29 DIAGNOSIS — J45.40 MODERATE PERSISTENT ASTHMA WITHOUT COMPLICATION: Primary | ICD-10-CM

## 2021-07-29 PROCEDURE — 96372 THER/PROPH/DIAG INJ SC/IM: CPT

## 2021-07-29 PROCEDURE — 25010000002 OMALIZUMAB PER 5 MG: Performed by: ALLERGY & IMMUNOLOGY

## 2021-07-29 RX ADMIN — OMALIZUMAB 150 MG: 202.5 INJECTION, SOLUTION SUBCUTANEOUS at 09:47

## 2021-08-26 ENCOUNTER — HOSPITAL ENCOUNTER (OUTPATIENT)
Dept: INFUSION THERAPY | Facility: HOSPITAL | Age: 71
Discharge: HOME | End: 2021-08-26
Admitting: ALLERGY & IMMUNOLOGY

## 2021-08-26 VITALS
TEMPERATURE: 96.4 F | RESPIRATION RATE: 20 BRPM | OXYGEN SATURATION: 94 % | SYSTOLIC BLOOD PRESSURE: 133 MMHG | HEART RATE: 77 BPM | DIASTOLIC BLOOD PRESSURE: 82 MMHG

## 2021-08-26 DIAGNOSIS — J45.40 MODERATE PERSISTENT ASTHMA WITHOUT COMPLICATION: Primary | ICD-10-CM

## 2021-08-26 PROCEDURE — 96372 THER/PROPH/DIAG INJ SC/IM: CPT

## 2021-08-26 PROCEDURE — 25010000002 OMALIZUMAB PER 5 MG: Performed by: ALLERGY & IMMUNOLOGY

## 2021-08-26 RX ADMIN — OMALIZUMAB 150 MG: 202.5 INJECTION, SOLUTION SUBCUTANEOUS at 10:19

## 2021-08-26 NOTE — PROGRESS NOTES
Pt tolerated injections well.  Pt monitored for 30 minutes post injection without complication.  AVS declined & pt DC per ambulation after completion of visit.

## 2021-09-17 ENCOUNTER — OFFICE VISIT (OUTPATIENT)
Dept: FAMILY MEDICINE CLINIC | Facility: CLINIC | Age: 71
End: 2021-09-17

## 2021-09-17 VITALS
DIASTOLIC BLOOD PRESSURE: 70 MMHG | WEIGHT: 205.8 LBS | SYSTOLIC BLOOD PRESSURE: 114 MMHG | BODY MASS INDEX: 34.29 KG/M2 | HEIGHT: 65 IN | HEART RATE: 71 BPM | OXYGEN SATURATION: 97 % | TEMPERATURE: 98.6 F

## 2021-09-17 DIAGNOSIS — K21.9 GASTROESOPHAGEAL REFLUX DISEASE WITHOUT ESOPHAGITIS: Chronic | ICD-10-CM

## 2021-09-17 DIAGNOSIS — T78.40XS ALLERGY, SEQUELA: ICD-10-CM

## 2021-09-17 DIAGNOSIS — S62.101S CLOSED FRACTURE OF RIGHT WRIST, SEQUELA: ICD-10-CM

## 2021-09-17 DIAGNOSIS — I10 ESSENTIAL HYPERTENSION: Primary | Chronic | ICD-10-CM

## 2021-09-17 DIAGNOSIS — M15.9 PRIMARY OSTEOARTHRITIS INVOLVING MULTIPLE JOINTS: ICD-10-CM

## 2021-09-17 DIAGNOSIS — E78.5 HYPERLIPIDEMIA, UNSPECIFIED HYPERLIPIDEMIA TYPE: Chronic | ICD-10-CM

## 2021-09-17 DIAGNOSIS — Z98.84 S/P GASTRIC BYPASS: Chronic | ICD-10-CM

## 2021-09-17 DIAGNOSIS — E53.8 B12 DEFICIENCY: ICD-10-CM

## 2021-09-17 DIAGNOSIS — Z23 ENCOUNTER FOR IMMUNIZATION: ICD-10-CM

## 2021-09-17 DIAGNOSIS — E03.9 HYPOTHYROIDISM, UNSPECIFIED TYPE: Chronic | ICD-10-CM

## 2021-09-17 DIAGNOSIS — Z12.31 VISIT FOR SCREENING MAMMOGRAM: ICD-10-CM

## 2021-09-17 DIAGNOSIS — K90.9 MALABSORPTION SYNDROME: ICD-10-CM

## 2021-09-17 DIAGNOSIS — F32.A DEPRESSION, UNSPECIFIED DEPRESSION TYPE: Chronic | ICD-10-CM

## 2021-09-17 DIAGNOSIS — G47.8 ABNORMAL REM SLEEP: ICD-10-CM

## 2021-09-17 PROCEDURE — 1159F MED LIST DOCD IN RCRD: CPT | Performed by: FAMILY MEDICINE

## 2021-09-17 PROCEDURE — G0439 PPPS, SUBSEQ VISIT: HCPCS | Performed by: FAMILY MEDICINE

## 2021-09-17 PROCEDURE — 90471 IMMUNIZATION ADMIN: CPT | Performed by: FAMILY MEDICINE

## 2021-09-17 PROCEDURE — 96160 PT-FOCUSED HLTH RISK ASSMT: CPT | Performed by: FAMILY MEDICINE

## 2021-09-17 PROCEDURE — 90715 TDAP VACCINE 7 YRS/> IM: CPT | Performed by: FAMILY MEDICINE

## 2021-09-17 PROCEDURE — 1170F FXNL STATUS ASSESSED: CPT | Performed by: FAMILY MEDICINE

## 2021-09-17 PROCEDURE — 99214 OFFICE O/P EST MOD 30 MIN: CPT | Performed by: FAMILY MEDICINE

## 2021-09-17 RX ORDER — EPINEPHRINE 0.3 MG/.3ML
INJECTION SUBCUTANEOUS
COMMUNITY
Start: 2021-09-13

## 2021-09-17 RX ORDER — LEVOTHYROXINE SODIUM 0.05 MG/1
50 TABLET ORAL DAILY
Qty: 90 TABLET | Refills: 3 | Status: SHIPPED | OUTPATIENT
Start: 2021-09-17 | End: 2022-06-27

## 2021-09-17 RX ORDER — POTASSIUM CHLORIDE 20 MEQ/1
20 TABLET, EXTENDED RELEASE ORAL 2 TIMES DAILY
Qty: 180 TABLET | Refills: 3 | Status: SHIPPED | OUTPATIENT
Start: 2021-09-17 | End: 2022-09-26 | Stop reason: SDUPTHER

## 2021-09-17 RX ORDER — HYDROCHLOROTHIAZIDE 12.5 MG/1
12.5 TABLET ORAL DAILY
Qty: 90 TABLET | Refills: 3 | Status: SHIPPED | OUTPATIENT
Start: 2021-09-17 | End: 2022-06-27

## 2021-09-17 RX ORDER — CHOLECALCIFEROL (VITAMIN D3) 125 MCG
5 CAPSULE ORAL
COMMUNITY
End: 2022-08-17

## 2021-09-17 RX ORDER — FERROUS SULFATE 325(65) MG
325 TABLET ORAL
Qty: 90 TABLET | Refills: 3 | Status: SHIPPED | OUTPATIENT
Start: 2021-09-17

## 2021-09-17 RX ORDER — AZELASTINE HYDROCHLORIDE 0.5 MG/ML
1 SOLUTION/ DROPS OPHTHALMIC 2 TIMES DAILY
Qty: 6 EACH | Refills: 3 | Status: SHIPPED | OUTPATIENT
Start: 2021-09-17

## 2021-09-17 RX ORDER — AZELASTINE 1 MG/ML
1 SPRAY, METERED NASAL 2 TIMES DAILY
Qty: 30 ML | Refills: 2 | Status: SHIPPED | OUTPATIENT
Start: 2021-09-17

## 2021-09-17 RX ORDER — AZELASTINE 1 MG/ML
1 SPRAY, METERED NASAL 2 TIMES DAILY
Qty: 30 ML | Refills: 2 | Status: SHIPPED | OUTPATIENT
Start: 2021-09-17 | End: 2021-09-17 | Stop reason: SDUPTHER

## 2021-09-17 RX ORDER — ESOMEPRAZOLE MAGNESIUM 40 MG/1
40 CAPSULE, DELAYED RELEASE ORAL 2 TIMES DAILY
Qty: 180 CAPSULE | Refills: 3 | Status: SHIPPED | OUTPATIENT
Start: 2021-09-17 | End: 2022-03-30 | Stop reason: SDUPTHER

## 2021-09-17 RX ORDER — ESCITALOPRAM OXALATE 20 MG/1
20 TABLET ORAL DAILY
Qty: 90 TABLET | Refills: 3 | Status: SHIPPED | OUTPATIENT
Start: 2021-09-17 | End: 2022-08-17

## 2021-09-17 RX ORDER — CELECOXIB 200 MG/1
200 CAPSULE ORAL DAILY
Qty: 90 CAPSULE | Refills: 3 | Status: SHIPPED | OUTPATIENT
Start: 2021-09-17 | End: 2022-08-17

## 2021-09-17 NOTE — PROGRESS NOTES
The ABCs of the Annual Wellness Visit  Subsequent Medicare Wellness Visit    Chief Complaint   Patient presents with   • Hypertension   • Med Refill   • Medicare Wellness-subsequent      Subjective    History of Present Illness:  Octavia Pendleton is a 71 y.o. female who presents for a Subsequent Medicare Wellness Visit.  Patient also here to follow-up on her hypertension, hypothyroidism, depression, and malabsorptive syndrome from gastric bypass.  She has been very fatigued.  She stopped her CPAP machine after her sleep study of the last time showed she did not need it anymore.  She did not follow-up with the sleep specialist after her last sleep study and it did show extensive snoring and absence of REM sleep.  She fell off a chair couple weeks ago painting and broke her wrist.  She is seeing Dr. Hall for it.  Recently, she had an MRI because of asymmetrical hearing loss ordered by ENT.  The results are not available to me.    The following portions of the patient's history were reviewed and   updated as appropriate: allergies, current medications, past family history, past medical history, past social history, past surgical history and problem list.    Compared to one year ago, the patient feels her physical   health is worse.    Compared to one year ago, the patient feels her mental   health is the same.    Recent Hospitalizations:  She was not admitted to the hospital during the last year.       Current Medical Providers:  Patient Care Team:  Kehrer, Meredith Lea, MD as PCP - General  Kehrer, Meredith Lea, MD as PCP - Family Medicine    Outpatient Medications Prior to Visit   Medication Sig Dispense Refill   • albuterol (PROVENTIL HFA;VENTOLIN HFA) 108 (90 BASE) MCG/ACT inhaler Inhale 2 puffs every 4 (four) hours as needed for wheezing.     • cyanocobalamin 1000 MCG/ML injection INJECT 1 ML INTO THE APPROPRIATE MUSCLE AS DIRECTED BY PRESCRIBER EVERY 28 DAYS 3 mL 0   • EPINEPHrine (EPIPEN) 0.3 MG/0.3ML  solution auto-injector injection      • fexofenadine (ALLEGRA) 180 MG tablet Take 180 mg by mouth daily.     • fluticasone (FLONASE) 50 MCG/ACT nasal spray 2 sprays into each nostril daily. Administer 2 sprays in each nostril for each dose.     • Fluticasone Furoate-Vilanterol (BREO ELLIPTA IN) Inhale 2 puffs Daily.     • levalbuterol (XOPENEX) 0.63 MG/3ML nebulizer solution Take 1 ampule by nebulization every 4 (four) hours as needed for wheezing.     • melatonin 5 MG tablet tablet Take 5 mg by mouth.     • Meloxicam 5 MG capsule Take  by mouth.     • omalizumab (XOLAIR) 150 MG injection Inject 150 mg under the skin every 30 (thirty) days.     • Probiotic Product (FLORAJEN3) capsule Take 1 tablet by mouth daily.     • thiamine (B-1) 100 MG/ML injection Inject 2 mL into the appropriate muscle as directed by prescriber Every 30 (Thirty) Days. (Patient taking differently: Inject 200 mg into the appropriate muscle as directed by prescriber Every 3 (Three) Months.) 6 mL 0   • azelastine (ASTELIN) 0.1 % nasal spray 1 spray into the nostril(s) as directed by provider 2 (Two) Times a Day. Use in each nostril as directed 30 mL 2   • azelastine (OPTIVAR) 0.05 % ophthalmic solution ADMINISTER ONE DROP INTO BOTH EYES TWO TIMES A DAY 6 each 1   • celecoxib (CeleBREX) 200 MG capsule TAKE ONE CAPSULE BY MOUTH DAILY 90 capsule 1   • escitalopram (LEXAPRO) 20 MG tablet TAKE ONE TABLET BY MOUTH DAILY 90 tablet 1   • esomeprazole (nexIUM) 40 MG capsule TAKE ONE CAPSULE BY MOUTH TWICE A  capsule 1   • ferrous sulfate 325 (65 FE) MG tablet Take 1 tablet by mouth Daily With Breakfast. 90 tablet 3   • hydroCHLOROthiazide (HYDRODIURIL) 12.5 MG tablet TAKE ONE TABLET BY MOUTH DAILY 90 tablet 0   • KLOR-CON 20 MEQ CR tablet TAKE ONE TABLET BY MOUTH TWICE A  tablet 1   • levothyroxine (SYNTHROID, LEVOTHROID) 50 MCG tablet TAKE ONE TABLET BY MOUTH DAILY 90 tablet 0     Facility-Administered Medications Prior to Visit    Medication Dose Route Frequency Provider Last Rate Last Admin   • cyanocobalamin injection 1,000 mcg  1,000 mcg Intramuscular Q28 Days Kehrer, Meredith Lea, MD   1,000 mcg at 03/16/21 1515       No opioid medication identified on active medication list. I have reviewed chart for other potential  high risk medication/s and harmful drug interactions in the elderly.          Aspirin is not on active medication list.  Aspirin use is not indicated based on review of current medical condition/s. Risk of harm outweighs potential benefits.  .    Patient Active Problem List   Diagnosis   • Asthma, moderate persistent   • JAY on CPAP   • Hypersomnia   • Chronic fatigue   • Non-restorative sleep   • Psychophysiological insomnia   • S/P abdominoplasty   • Panniculus   • Hyperlipidemia   • Hypothyroidism   • Vitamin B12 deficiency   • Vitamin D deficiency   • Thiamine deficiency   • S/P gastric bypass   • GERD (gastroesophageal reflux disease)   • Depression     Advance Care Planning  Advance Directive is not on file.  ACP discussion was held with the patient during this visit. Patient has an advance directive (not in EMR), copy requested.    Review of Systems   Constitutional: Positive for fatigue. Negative for chills and fever.   HENT: Negative for congestion, ear pain and sore throat.    Eyes: Negative for visual disturbance.   Respiratory: Negative for cough and shortness of breath.    Cardiovascular: Negative for chest pain, palpitations and leg swelling.   Gastrointestinal: Negative for diarrhea, nausea and vomiting.   Endocrine: Negative.    Genitourinary: Negative for dysuria and frequency.   Musculoskeletal: Positive for arthralgias.   Skin: Negative.    Psychiatric/Behavioral: Negative for dysphoric mood and sleep disturbance. The patient is not nervous/anxious.         Objective    Vitals:    09/17/21 0823   BP: 114/70   Pulse: 71   Temp: 98.6 °F (37 °C)   SpO2: 97%   Weight: 93.4 kg (205 lb 12.8 oz)   Height: 164.5  "cm (64.75\")     BMI Readings from Last 1 Encounters:   09/17/21 34.51 kg/m²   BMI is above normal parameters. Recommendations include: nutrition counseling    Does the patient have evidence of cognitive impairment? No    Physical Exam  Vitals and nursing note reviewed.   Constitutional:       General: She is not in acute distress.     Appearance: Normal appearance. She is well-developed. She is obese.   HENT:      Head: Normocephalic and atraumatic.      Right Ear: Tympanic membrane, ear canal and external ear normal.      Left Ear: Tympanic membrane, ear canal and external ear normal.      Nose: Nose normal.      Mouth/Throat:      Mouth: Mucous membranes are moist.      Pharynx: Oropharynx is clear. No oropharyngeal exudate or posterior oropharyngeal erythema.   Eyes:      Conjunctiva/sclera: Conjunctivae normal.      Pupils: Pupils are equal, round, and reactive to light.   Neck:      Thyroid: No thyromegaly.   Cardiovascular:      Rate and Rhythm: Normal rate and regular rhythm.      Heart sounds: No murmur heard.     Pulmonary:      Effort: Pulmonary effort is normal.      Breath sounds: Normal breath sounds. No wheezing.   Abdominal:      General: Abdomen is flat. Bowel sounds are normal. There is no distension.      Palpations: Abdomen is soft. There is no mass.      Tenderness: There is no abdominal tenderness.      Hernia: No hernia is present.   Musculoskeletal:         General: No swelling. Normal range of motion.      Cervical back: Normal range of motion and neck supple.      Right lower leg: No edema.      Left lower leg: No edema.   Lymphadenopathy:      Cervical: No cervical adenopathy.   Skin:     General: Skin is warm and dry.      Capillary Refill: Capillary refill takes less than 2 seconds.      Findings: No rash.   Neurological:      General: No focal deficit present.      Mental Status: She is alert and oriented to person, place, and time.      Cranial Nerves: No cranial nerve deficit. "   Psychiatric:         Mood and Affect: Mood normal.         Behavior: Behavior normal.                 HEALTH RISK ASSESSMENT    Smoking Status:  Social History     Tobacco Use   Smoking Status Never Smoker   Smokeless Tobacco Never Used     Alcohol Consumption:  Social History     Substance and Sexual Activity   Alcohol Use Yes   • Alcohol/week: 1.0 standard drinks   • Types: 1 Glasses of wine per week    Comment: rare use     Fall Risk Screen:    KIARA Fall Risk Assessment was completed, and patient is at HIGH risk for falls. Assessment completed on:9/17/2021    Depression Screening:  PHQ-2/PHQ-9 Depression Screening 9/17/2021   Little interest or pleasure in doing things 0   Feeling down, depressed, or hopeless 1   Trouble falling or staying asleep, or sleeping too much -   Feeling tired or having little energy -   Poor appetite or overeating -   Feeling bad about yourself - or that you are a failure or have let yourself or your family down -   Trouble concentrating on things, such as reading the newspaper or watching television -   Moving or speaking so slowly that other people could have noticed. Or the opposite - being so fidgety or restless that you have been moving around a lot more than usual -   Thoughts that you would be better off dead, or of hurting yourself in some way -   Total Score 1   If you checked off any problems, how difficult have these problems made it for you to do your work, take care of things at home, or get along with other people? -       Health Habits and Functional and Cognitive Screening:  Functional & Cognitive Status 9/17/2021   Do you have difficulty preparing food and eating? No   Do you have difficulty bathing yourself, getting dressed or grooming yourself? No   Do you have difficulty using the toilet? No   Do you have difficulty moving around from place to place? No   Do you have trouble with steps or getting out of a bed or a chair? No   Current Diet Well Balanced Diet    Dental Exam Up to date   Eye Exam Up to date   Exercise (times per week) 2 times per week   Current Exercises Include Walking;House Cleaning;Gardening   Current Exercise Activities Include -   Do you need help using the phone?  No   Are you deaf or do you have serious difficulty hearing?  No   Do you need help with transportation? No   Do you need help shopping? Yes   Do you need help preparing meals?  No   Do you need help with housework?  Yes   Do you need help with laundry? Yes   Do you need help taking your medications? No   Do you need help managing money? No   Do you ever drive or ride in a car without wearing a seat belt? No   Have you felt unusual stress, anger or loneliness in the last month? -   Who do you live with? -   If you need help, do you have trouble finding someone available to you? -   Have you been bothered in the last four weeks by sexual problems? -   Do you have difficulty concentrating, remembering or making decisions? -       Age-appropriate Screening Schedule:  Refer to the list below for future screening recommendations based on patient's age, sex and/or medical conditions. Orders for these recommended tests are listed in the plan section. The patient has been provided with a written plan.    Health Maintenance   Topic Date Due   • LIPID PANEL  08/10/2021   • INFLUENZA VACCINE  10/01/2021   • MAMMOGRAM  10/28/2022   • DXA SCAN  10/28/2022   • TDAP/TD VACCINES (2 - Td or Tdap) 09/17/2031   • ZOSTER VACCINE  Completed   • PAP SMEAR  Discontinued              Assessment/Plan   CMS Preventative Services Quick Reference  Risk Factors Identified During Encounter  Obesity/Overweight   The above risks/problems have been discussed with the patient.  Follow up actions/plans if indicated are seen below in the Assessment/Plan Section.  Pertinent information has been shared with the patient in the After Visit Summary.    Diagnoses and all orders for this visit:    1. Essential hypertension  (Primary)  -     CBC & Differential  -     Comprehensive Metabolic Panel  -     hydroCHLOROthiazide (HYDRODIURIL) 12.5 MG tablet; Take 1 tablet by mouth Daily.  Dispense: 90 tablet; Refill: 3    2. Hypothyroidism, unspecified type  -     TSH  -     levothyroxine (SYNTHROID, LEVOTHROID) 50 MCG tablet; Take 1 tablet by mouth Daily.  Dispense: 90 tablet; Refill: 3    3. Hyperlipidemia, unspecified hyperlipidemia type  -     Lipid Panel    4. Depression, unspecified depression type  -     escitalopram (LEXAPRO) 20 MG tablet; Take 1 tablet by mouth Daily.  Dispense: 90 tablet; Refill: 3    5. Gastroesophageal reflux disease without esophagitis  -     esomeprazole (nexIUM) 40 MG capsule; Take 1 capsule by mouth 2 (Two) Times a Day.  Dispense: 180 capsule; Refill: 3    6. S/P gastric bypass  -     Vitamin B12  -     Vitamin D 25 Hydroxy  -     Ferritin  -     Iron  -     CBC & Differential  -     Vitamin B1, Whole Blood  -     potassium chloride (KLOR-CON) 20 MEQ CR tablet; Take 1 tablet by mouth 2 (Two) Times a Day.  Dispense: 180 tablet; Refill: 3  -     ferrous sulfate 325 (65 FE) MG tablet; Take 1 tablet by mouth Daily With Breakfast.  Dispense: 90 tablet; Refill: 3    7. Encounter for immunization  -     Tdap Vaccine Greater Than or Equal To 6yo IM    8. B12 deficiency  -     Vitamin B12    9. Closed fracture of right wrist, sequela    10. Abnormal REM sleep  -     Ambulatory Referral to Sleep Medicine    11. Malabsorption syndrome  -     Vitamin B12  -     Vitamin D 25 Hydroxy  -     Ferritin  -     Iron  -     Vitamin B1, Whole Blood    12. Primary osteoarthritis involving multiple joints  -     celecoxib (CeleBREX) 200 MG capsule; Take 1 capsule by mouth Daily.  Dispense: 90 capsule; Refill: 3    13. Allergy, sequela  -     azelastine (OPTIVAR) 0.05 % ophthalmic solution; Administer 1 drop to both eyes 2 (Two) Times a Day.  Dispense: 6 each; Refill: 3  -     azelastine (ASTELIN) 0.1 % nasal spray; 1 spray into the  nostril(s) as directed by provider 2 (Two) Times a Day. Use in each nostril as directed  Dispense: 30 mL; Refill: 2    14. Visit for screening mammogram  -     Mammo Screening Digital Tomosynthesis Bilateral With CAD; Future    Hypertension-to goal, continue current medication and recheck labs today  Hypothyroidism-due for TSH today  Hyperlipidemia-due for labs today, patient has had issues with rhabdo with statins in the past but would be willing to try if necessary  Arthritis-continue Celebrex  Allergies-refill medications  Asthma-managed through pulmonary  Malabsorption syndrome status post gastric bypass-recheck labs today  Fatigue and dysfunctional sleep her sleep study-we will get back into see sleep medicine      Follow Up:   Return in about 6 months (around 3/17/2022) for Recheck.     An After Visit Summary and PPPS were made available to the patient.

## 2021-09-17 NOTE — PATIENT INSTRUCTIONS
Medicare Wellness  Personal Prevention Plan of Service     Date of Office Visit:  2021  Encounter Provider:  Meredith Lea Kehrer, MD  Place of Service:  [facility]  Patient Name: Octavia Pendleton  :  1950    As part of the Medicare Wellness portion of your visit today, we are providing you with this personalized preventive plan of services (PPPS). This plan is based upon recommendations of the [place] Preventive Services Task Force (USPSTF) and the Advisory Committee on Immunization Practices (ACIP).    This lists the preventive care services that should be considered, and provides dates of when you are due. Items listed as completed are up-to-date and do not require any further intervention.    Health Maintenance   Topic Date Due   • ANNUAL WELLNESS VISIT  08/10/2021   • LIPID PANEL  08/10/2021   • INFLUENZA VACCINE  10/01/2021   • MAMMOGRAM  10/28/2022   • DXA SCAN  10/28/2022   • COLORECTAL CANCER SCREENING  2028   • TDAP/TD VACCINES (2 - Td or Tdap) 2031   • HEPATITIS C SCREENING  Completed   • COVID-19 Vaccine  Completed   • Pneumococcal Vaccine 65+  Completed   • ZOSTER VACCINE  Completed   • PAP SMEAR  Discontinued       Orders Placed This Encounter   Procedures   • Mammo Screening Digital Tomosynthesis Bilateral With CAD     Standing Status:   Future     Standing Expiration Date:   2022     Order Specific Question:   Reason for Exam:     Answer:   screening     Order Specific Question:   Release to patient     Answer:   Immediate   • Tdap Vaccine Greater Than or Equal To 6yo IM   • Lipid Panel   • Vitamin B12     Order Specific Question:   Release to patient     Answer:   Immediate   • Vitamin D 25 Hydroxy     Order Specific Question:   Release to patient     Answer:   Immediate   • Ferritin   • Iron   • Comprehensive Metabolic Panel     Order Specific Question:   Release to patient     Answer:   Immediate   • Vitamin B1, Whole Blood      Order Specific Question:   Release to patient     Answer:   Immediate   • TSH     Order Specific Question:   Release to patient     Answer:   Immediate   • Ambulatory Referral to Sleep Medicine     Referral Priority:   Routine     Referred to Provider:   Erica Santa MD     Number of Visits Requested:   1   • CBC & Differential     Order Specific Question:   Manual Differential     Answer:   No       Return in about 6 months (around 3/17/2022) for Recheck.

## 2021-09-23 ENCOUNTER — HOSPITAL ENCOUNTER (OUTPATIENT)
Dept: INFUSION THERAPY | Facility: HOSPITAL | Age: 71
Discharge: HOME | End: 2021-09-23
Admitting: ALLERGY & IMMUNOLOGY

## 2021-09-23 VITALS
DIASTOLIC BLOOD PRESSURE: 68 MMHG | TEMPERATURE: 97.5 F | SYSTOLIC BLOOD PRESSURE: 120 MMHG | HEART RATE: 69 BPM | OXYGEN SATURATION: 95 % | RESPIRATION RATE: 20 BRPM

## 2021-09-23 DIAGNOSIS — J45.40 MODERATE PERSISTENT ASTHMA WITHOUT COMPLICATION: Primary | ICD-10-CM

## 2021-09-23 PROCEDURE — 25010000002 OMALIZUMAB PER 5 MG: Performed by: ALLERGY & IMMUNOLOGY

## 2021-09-23 PROCEDURE — 96372 THER/PROPH/DIAG INJ SC/IM: CPT

## 2021-09-23 RX ADMIN — OMALIZUMAB 150 MG: 202.5 INJECTION, SOLUTION SUBCUTANEOUS at 08:11

## 2021-09-24 LAB
25(OH)D3+25(OH)D2 SERPL-MCNC: 58.3 NG/ML (ref 30–100)
ALBUMIN SERPL-MCNC: 4.3 G/DL (ref 3.5–5.2)
ALBUMIN/GLOB SERPL: 2.3 G/DL
ALP SERPL-CCNC: 95 U/L (ref 39–117)
ALT SERPL-CCNC: 15 U/L (ref 1–33)
AST SERPL-CCNC: 21 U/L (ref 1–32)
BASOPHILS # BLD AUTO: 0.07 10*3/MM3 (ref 0–0.2)
BASOPHILS NFR BLD AUTO: 1 % (ref 0–1.5)
BILIRUB SERPL-MCNC: 0.4 MG/DL (ref 0–1.2)
BUN SERPL-MCNC: 23 MG/DL (ref 8–23)
BUN/CREAT SERPL: 27.4 (ref 7–25)
CALCIUM SERPL-MCNC: 9.3 MG/DL (ref 8.6–10.5)
CHLORIDE SERPL-SCNC: 101 MMOL/L (ref 98–107)
CHOLEST SERPL-MCNC: 217 MG/DL (ref 0–200)
CO2 SERPL-SCNC: 27.3 MMOL/L (ref 22–29)
CREAT SERPL-MCNC: 0.84 MG/DL (ref 0.57–1)
EOSINOPHIL # BLD AUTO: 0.2 10*3/MM3 (ref 0–0.4)
EOSINOPHIL NFR BLD AUTO: 2.8 % (ref 0.3–6.2)
ERYTHROCYTE [DISTWIDTH] IN BLOOD BY AUTOMATED COUNT: 13.2 % (ref 12.3–15.4)
FERRITIN SERPL-MCNC: 106 NG/ML (ref 13–150)
GLOBULIN SER CALC-MCNC: 1.9 GM/DL
GLUCOSE SERPL-MCNC: 93 MG/DL (ref 65–99)
HCT VFR BLD AUTO: 43.1 % (ref 34–46.6)
HDLC SERPL-MCNC: 72 MG/DL (ref 40–60)
HGB BLD-MCNC: 13.8 G/DL (ref 12–15.9)
IMM GRANULOCYTES # BLD AUTO: 0.02 10*3/MM3 (ref 0–0.05)
IMM GRANULOCYTES NFR BLD AUTO: 0.3 % (ref 0–0.5)
IRON SERPL-MCNC: 109 MCG/DL (ref 37–145)
LDLC SERPL CALC-MCNC: 125 MG/DL (ref 0–100)
LYMPHOCYTES # BLD AUTO: 2.79 10*3/MM3 (ref 0.7–3.1)
LYMPHOCYTES NFR BLD AUTO: 38.9 % (ref 19.6–45.3)
MCH RBC QN AUTO: 29.5 PG (ref 26.6–33)
MCHC RBC AUTO-ENTMCNC: 32 G/DL (ref 31.5–35.7)
MCV RBC AUTO: 92.1 FL (ref 79–97)
MONOCYTES # BLD AUTO: 0.87 10*3/MM3 (ref 0.1–0.9)
MONOCYTES NFR BLD AUTO: 12.1 % (ref 5–12)
NEUTROPHILS # BLD AUTO: 3.23 10*3/MM3 (ref 1.7–7)
NEUTROPHILS NFR BLD AUTO: 44.9 % (ref 42.7–76)
NRBC BLD AUTO-RTO: 0 /100 WBC (ref 0–0.2)
PLATELET # BLD AUTO: 288 10*3/MM3 (ref 140–450)
POTASSIUM SERPL-SCNC: 4.1 MMOL/L (ref 3.5–5.2)
PROT SERPL-MCNC: 6.2 G/DL (ref 6–8.5)
RBC # BLD AUTO: 4.68 10*6/MM3 (ref 3.77–5.28)
SODIUM SERPL-SCNC: 135 MMOL/L (ref 136–145)
TRIGL SERPL-MCNC: 116 MG/DL (ref 0–150)
TSH SERPL DL<=0.005 MIU/L-ACNC: 0.64 UIU/ML (ref 0.27–4.2)
VIT B1 BLD-SCNC: 175.8 NMOL/L (ref 66.5–200)
VIT B12 SERPL-MCNC: 522 PG/ML (ref 211–946)
VLDLC SERPL CALC-MCNC: 20 MG/DL (ref 5–40)
WBC # BLD AUTO: 7.18 10*3/MM3 (ref 3.4–10.8)

## 2021-10-21 ENCOUNTER — HOSPITAL ENCOUNTER (OUTPATIENT)
Dept: INFUSION THERAPY | Facility: HOSPITAL | Age: 71
Discharge: HOME | End: 2021-10-21
Admitting: ALLERGY & IMMUNOLOGY

## 2021-10-21 VITALS
BODY MASS INDEX: 33.99 KG/M2 | OXYGEN SATURATION: 96 % | DIASTOLIC BLOOD PRESSURE: 58 MMHG | HEIGHT: 65 IN | TEMPERATURE: 97.1 F | SYSTOLIC BLOOD PRESSURE: 124 MMHG | RESPIRATION RATE: 20 BRPM | WEIGHT: 204 LBS | HEART RATE: 50 BPM

## 2021-10-21 DIAGNOSIS — J45.40 MODERATE PERSISTENT ASTHMA WITHOUT COMPLICATION: Primary | ICD-10-CM

## 2021-10-21 PROCEDURE — 96372 THER/PROPH/DIAG INJ SC/IM: CPT

## 2021-10-21 PROCEDURE — 25010000002 OMALIZUMAB PER 5 MG: Performed by: ALLERGY & IMMUNOLOGY

## 2021-10-21 RX ORDER — INFLUENZA A VIRUS A/MICHIGAN/45/2015 X-275 (H1N1) ANTIGEN (FORMALDEHYDE INACTIVATED), INFLUENZA A VIRUS A/SINGAPORE/INFIMH-16-0019/2016 IVR-186 (H3N2) ANTIGEN (FORMALDEHYDE INACTIVATED), INFLUENZA B VIRUS B/PHUKET/3073/2013 ANTIGEN (FORMALDEHYDE INACTIVATED), AND INFLUENZA B VIRUS B/MARYLAND/15/2016 BX-69A ANTIGEN (FORMALDEHYDE INACTIVATED) 60; 60; 60; 60 UG/.7ML; UG/.7ML; UG/.7ML; UG/.7ML
INJECTION, SUSPENSION INTRAMUSCULAR
COMMUNITY
Start: 2021-09-17 | End: 2022-08-17

## 2021-10-21 RX ADMIN — OMALIZUMAB 150 MG: 202.5 INJECTION, SOLUTION SUBCUTANEOUS at 09:48

## 2021-10-28 ENCOUNTER — OFFICE VISIT (OUTPATIENT)
Dept: SLEEP MEDICINE | Facility: HOSPITAL | Age: 71
End: 2021-10-28

## 2021-10-28 VITALS
OXYGEN SATURATION: 93 % | HEART RATE: 77 BPM | BODY MASS INDEX: 33.66 KG/M2 | SYSTOLIC BLOOD PRESSURE: 148 MMHG | HEIGHT: 65 IN | WEIGHT: 202 LBS | DIASTOLIC BLOOD PRESSURE: 75 MMHG

## 2021-10-28 DIAGNOSIS — R51.9 MORNING HEADACHE: ICD-10-CM

## 2021-10-28 DIAGNOSIS — R06.83 SNORING: ICD-10-CM

## 2021-10-28 DIAGNOSIS — G47.30 OBSERVED SLEEP APNEA: Primary | ICD-10-CM

## 2021-10-28 DIAGNOSIS — G47.8 NON-RESTORATIVE SLEEP: ICD-10-CM

## 2021-10-28 DIAGNOSIS — G47.10 HYPERSOMNIA: ICD-10-CM

## 2021-10-28 DIAGNOSIS — F51.04 PSYCHOPHYSIOLOGICAL INSOMNIA: ICD-10-CM

## 2021-10-28 PROBLEM — E66.811 CLASS 1 OBESITY: Status: ACTIVE | Noted: 2021-10-28

## 2021-10-28 PROBLEM — E66.9 CLASS 1 OBESITY: Status: ACTIVE | Noted: 2021-10-28

## 2021-10-28 PROCEDURE — 99214 OFFICE O/P EST MOD 30 MIN: CPT | Performed by: INTERNAL MEDICINE

## 2021-10-28 PROCEDURE — G0463 HOSPITAL OUTPT CLINIC VISIT: HCPCS

## 2021-10-28 NOTE — PROGRESS NOTES
"  [facility]  [address]  3rd [place] [address]  Phone   Fax       SLEEP CLINIC FOLLOW UP PROGRESS NOTE.    Octavia Pendleton  1950  71 y.o.  female      PCP: Kehrer, Meredith Lea, MD      Date of visit: 10/28/2021    Chief Complaint   Patient presents with   • Witnessed Apnea   • Snoring   • Morning Headaches   • Insomnia   • Fatigue   • Non-restorative Sleep       HPI:  This is a 71 y.o. years old patient who has a history of obstructive sleep apnea for many years and previously she was on CPAP.  She saw me in September 2019 and had a repeat polysomnography.  At that time the polysomnography did not show any evidence of sleep apnea AHI is 1-1.2 but she had a RDI of 21/h and significant snoring for 50% of sleep time and periodic leg movements.  Patient has not used the CPAP for the past 2 years and now her symptoms are really getting worse.  She says that when she wakes up she is tired and exhausted and still has snoring.    Normally goes to bed around 9 PM  Wakes up around 8:30 AM  Takes about 30 to 60 minutes to fall asleep  Does not take any naps  She is observed to have snoring  Observed to have witnessed apnea    Medications and allergies are reviewed by me and documented in the encounter.     SOCIAL ( habits pertaining to sleep medicine)  History tobacco use:No   History of alcohol use: 1 per week  Caffeine use: 2     REVIEW OF SYSTEMS:   [place] Sleepiness Scale :Total score: 10   Nasal congestion:Yes   Dry mouth/nose:Yes   Post nasal drip; No   Acid reflux/Heartburn:Yes   Abd bloating:No   Morning headache:Yes   Anxiety:Yes   Depression:No  20 urination    PHYSICAL EXAMINATION:  CONSTITUTIONAL:  Vitals:    10/28/21 1018   BP: 148/75   Pulse: 77   SpO2: 93%   Weight: 91.6 kg (202 lb)   Height: 165.1 cm (65\")    Body mass index is 33.61 kg/m².   NOSE: nasal passages are clear, no nasal polyps, septum in the midline.  THROAT: throat is clear, oral " airway Mallampati class 3  RESP SYSTEM: Breath sounds are normal, no wheezes or crackles  CARDIOVASULAR: Heart rate is regular without murmur. No edema        ASSESSMENT AND PLAN:  · Witnessed apnea with snoring.  Patient needs reevaluation.  I have talked to the patient about getting a home sleep test to see whether she has sleep apnea.  If she has sleep apnea treating sleep apnea will benefit patient.  She will undergo home sleep test and see me back after this test is done  · Insomnia, continue to use melatonin but only take it about 2 hours before bedtime not at bedtime  · Snoring secondary to sleep apnea  · Nonrestorative sleep  · Hypersomnia with daytime excessive sleepiness of 10  · Hypertension  · Morning headaches  · Obesity, class 1 with BMI is Body mass index is 33.61 kg/m².. I have discuss the relationship between the weight and sleep apnea. The benefit of weight loss in reducing severity of sleep apnea was discussed. Discussed diet and exercise with the patient to achieve ideal BMI.  · Return for 31 to 90 days after PAP setup with down load. . Patient's questions were answered.        Erica Santa MD  Sleep Medicine.  Medical Director, [facility] sleep centers  10/28/2021 ,

## 2021-11-18 ENCOUNTER — HOSPITAL ENCOUNTER (OUTPATIENT)
Dept: INFUSION THERAPY | Facility: HOSPITAL | Age: 71
Discharge: HOME | End: 2021-11-18
Admitting: ALLERGY & IMMUNOLOGY

## 2021-11-18 ENCOUNTER — HOSPITAL ENCOUNTER (OUTPATIENT)
Dept: SLEEP MEDICINE | Facility: HOSPITAL | Age: 71
End: 2021-11-18

## 2021-11-18 VITALS
HEART RATE: 75 BPM | OXYGEN SATURATION: 96 % | RESPIRATION RATE: 20 BRPM | SYSTOLIC BLOOD PRESSURE: 132 MMHG | TEMPERATURE: 97.1 F | DIASTOLIC BLOOD PRESSURE: 79 MMHG

## 2021-11-18 DIAGNOSIS — R51.9 MORNING HEADACHE: ICD-10-CM

## 2021-11-18 DIAGNOSIS — G47.8 NON-RESTORATIVE SLEEP: ICD-10-CM

## 2021-11-18 DIAGNOSIS — G47.10 HYPERSOMNIA: ICD-10-CM

## 2021-11-18 DIAGNOSIS — G47.30 OBSERVED SLEEP APNEA: ICD-10-CM

## 2021-11-18 DIAGNOSIS — R06.83 SNORING: ICD-10-CM

## 2021-11-18 DIAGNOSIS — F51.04 PSYCHOPHYSIOLOGICAL INSOMNIA: ICD-10-CM

## 2021-11-18 DIAGNOSIS — J45.40 MODERATE PERSISTENT ASTHMA WITHOUT COMPLICATION: Primary | ICD-10-CM

## 2021-11-18 PROCEDURE — 95806 SLEEP STUDY UNATT&RESP EFFT: CPT

## 2021-11-18 PROCEDURE — 96372 THER/PROPH/DIAG INJ SC/IM: CPT

## 2021-11-18 PROCEDURE — 95806 SLEEP STUDY UNATT&RESP EFFT: CPT | Performed by: INTERNAL MEDICINE

## 2021-11-18 PROCEDURE — 25010000002 OMALIZUMAB PER 5 MG: Performed by: ALLERGY & IMMUNOLOGY

## 2021-11-18 RX ADMIN — OMALIZUMAB 150 MG: 202.5 INJECTION, SOLUTION SUBCUTANEOUS at 09:49

## 2021-12-06 ENCOUNTER — TELEPHONE (OUTPATIENT)
Dept: SLEEP MEDICINE | Facility: HOSPITAL | Age: 71
End: 2021-12-06

## 2021-12-06 NOTE — TELEPHONE ENCOUNTER
LV informing patient of sleep study results , sending orders to [facility] unless patient requests otherwise, pt will need compliance follow up

## 2021-12-16 ENCOUNTER — HOSPITAL ENCOUNTER (OUTPATIENT)
Dept: INFUSION THERAPY | Facility: HOSPITAL | Age: 71
Discharge: HOME | End: 2021-12-16
Admitting: ALLERGY & IMMUNOLOGY

## 2021-12-16 VITALS
OXYGEN SATURATION: 95 % | HEART RATE: 57 BPM | RESPIRATION RATE: 18 BRPM | SYSTOLIC BLOOD PRESSURE: 110 MMHG | TEMPERATURE: 96.8 F | DIASTOLIC BLOOD PRESSURE: 73 MMHG

## 2021-12-16 DIAGNOSIS — J45.40 MODERATE PERSISTENT ASTHMA WITHOUT COMPLICATION: Primary | ICD-10-CM

## 2021-12-16 PROCEDURE — 96372 THER/PROPH/DIAG INJ SC/IM: CPT

## 2021-12-16 PROCEDURE — 25010000002 OMALIZUMAB PER 5 MG: Performed by: ALLERGY & IMMUNOLOGY

## 2021-12-16 RX ADMIN — OMALIZUMAB 150 MG: 202.5 INJECTION, SOLUTION SUBCUTANEOUS at 08:38

## 2021-12-16 NOTE — PROGRESS NOTES
Patient tolerated injection without difficulty and observed for 30 minutes post injection.  No s/s of reaction notified and discharged at 0915

## 2021-12-20 ENCOUNTER — HOSPITAL ENCOUNTER (OUTPATIENT)
Dept: MAMMOGRAPHY | Facility: HOSPITAL | Age: 71
Discharge: HOME | End: 2021-12-20
Admitting: FAMILY MEDICINE

## 2021-12-20 DIAGNOSIS — Z12.31 VISIT FOR SCREENING MAMMOGRAM: ICD-10-CM

## 2021-12-20 PROCEDURE — 77067 SCR MAMMO BI INCL CAD: CPT

## 2021-12-20 PROCEDURE — 77063 BREAST TOMOSYNTHESIS BI: CPT

## 2022-01-13 ENCOUNTER — HOSPITAL ENCOUNTER (OUTPATIENT)
Dept: INFUSION THERAPY | Facility: HOSPITAL | Age: 72
Discharge: HOME | End: 2022-01-13
Admitting: ALLERGY & IMMUNOLOGY

## 2022-01-13 VITALS
OXYGEN SATURATION: 97 % | HEART RATE: 70 BPM | RESPIRATION RATE: 20 BRPM | TEMPERATURE: 97.1 F | SYSTOLIC BLOOD PRESSURE: 130 MMHG | DIASTOLIC BLOOD PRESSURE: 79 MMHG

## 2022-01-13 DIAGNOSIS — J45.40 MODERATE PERSISTENT ASTHMA WITHOUT COMPLICATION: Primary | ICD-10-CM

## 2022-01-13 PROCEDURE — 96372 THER/PROPH/DIAG INJ SC/IM: CPT

## 2022-01-13 PROCEDURE — 25010000002 OMALIZUMAB PER 5 MG: Performed by: ALLERGY & IMMUNOLOGY

## 2022-01-13 RX ADMIN — OMALIZUMAB 150 MG: 202.5 INJECTION, SOLUTION SUBCUTANEOUS at 10:12

## 2022-01-13 NOTE — PROGRESS NOTES
Tolerated injections well.  Monitored for 30 minutes post without incident.  AVS declined & pt DC per ambulation after completion of visit.

## 2022-02-08 ENCOUNTER — OFFICE VISIT (OUTPATIENT)
Dept: FAMILY MEDICINE CLINIC | Facility: CLINIC | Age: 72
End: 2022-02-08

## 2022-02-08 VITALS
TEMPERATURE: 97.4 F | DIASTOLIC BLOOD PRESSURE: 84 MMHG | HEART RATE: 135 BPM | BODY MASS INDEX: 35.27 KG/M2 | WEIGHT: 206.6 LBS | OXYGEN SATURATION: 100 % | SYSTOLIC BLOOD PRESSURE: 132 MMHG | HEIGHT: 64 IN

## 2022-02-08 DIAGNOSIS — R10.9 LEFT FLANK PAIN: ICD-10-CM

## 2022-02-08 DIAGNOSIS — R82.2 BILIRUBINURIA: ICD-10-CM

## 2022-02-08 DIAGNOSIS — R10.12 LEFT UPPER QUADRANT ABDOMINAL PAIN: ICD-10-CM

## 2022-02-08 DIAGNOSIS — R07.81 PAIN IN RIB: Primary | ICD-10-CM

## 2022-02-08 LAB
BILIRUB BLD-MCNC: ABNORMAL MG/DL
CLARITY, POC: CLEAR
COLOR UR: YELLOW
EXPIRATION DATE: ABNORMAL
GLUCOSE UR STRIP-MCNC: NEGATIVE MG/DL
KETONES UR QL: NEGATIVE
LEUKOCYTE EST, POC: NEGATIVE
Lab: ABNORMAL
NITRITE UR-MCNC: NEGATIVE MG/ML
PH UR: 6 [PH] (ref 5–8)
PROT UR STRIP-MCNC: NEGATIVE MG/DL
RBC # UR STRIP: NEGATIVE /UL
SP GR UR: 1.02 (ref 1–1.03)
UROBILINOGEN UR QL: NORMAL

## 2022-02-08 PROCEDURE — 81003 URINALYSIS AUTO W/O SCOPE: CPT | Performed by: FAMILY MEDICINE

## 2022-02-08 PROCEDURE — 99214 OFFICE O/P EST MOD 30 MIN: CPT | Performed by: FAMILY MEDICINE

## 2022-02-08 NOTE — PROGRESS NOTES
"Chief Complaint  Abdominal Pain (past 6 months)    Subjective          Octavia Pendleton presents to [facility]  History of Present Illness    Octavia Pendleton presents today for left flank pain for the last 6 months. She describes the pain as stabbing and an \"inside bruise,\" and it is exacerbated with movement. She adds feeling as if something to a \"bite\" out of her, and it will take her breath away. She states the pain is occurring more often. The patient notes having an MRI of her lower back via pain management; however, she denies pain management believes her pain is back related. She adds getting injections in her back for approximately 2.5 years. She denies having any nausea, vomiting, change in bowel movements, black or tarry stools, or constipation. She also denies having fever. She denies the pain is worse after eating nor has she had a rash or shingles in that area previously. The patient already has the shingles vaccine. She does not recall having any imaging testing done of her abdomen in the last 5 years.    The patient has a history of multiple abdominal surgeries including a gastric bypass, cholecystectomy, hernia repair, abdominoplasty, and a hysterectomy.    She denies having issues with CT contrast in the past.    Review of Systems     A review of systems was performed, and the pertinent positives are noted in the HPI.     Objective   Vital Signs:   /84   Pulse (!) 135   Temp 97.4 °F (36.3 °C)   Ht 162.6 cm (64\")   Wt 93.7 kg (206 lb 9.6 oz)   SpO2 100%   BMI 35.46 kg/m²     Physical Exam  Constitutional:       General: She is not in acute distress.     Appearance: Normal appearance. She is well-developed.   HENT:      Head: Normocephalic and atraumatic.      Right Ear: External ear normal.      Left Ear: External ear normal.   Eyes:      Conjunctiva/sclera: Conjunctivae normal.      Pupils: Pupils are equal, round, and reactive to light.   Neck:      " Thyroid: No thyromegaly.   Pulmonary:      Effort: Pulmonary effort is normal.   Abdominal:      Tenderness: There is no abdominal tenderness.      Comments: Tenderness over the left lower ribs with squeezing   Neurological:      Mental Status: She is alert and oriented to person, place, and time.   Psychiatric:         Mood and Affect: Mood normal.         Behavior: Behavior normal.        Result Review :   The following data was reviewed by: Meredith Lea Kehrer, MD on 02/08/2022:  UA    Urinalysis 2/8/22   Ketones, UA Negative   Leukocytes, UA Negative                     Assessment and Plan    Diagnoses and all orders for this visit:    1. Pain in rib (Primary)  -     XR Ribs Left With PA Chest; Future  -     Urine Culture - Urine, Urine, Clean Catch; Future  -     Urine Culture - Urine, Urine, Clean Catch    2. Left flank pain  -     POC Urinalysis Dipstick, Automated  -     Urine Culture - Urine, Urine, Clean Catch; Future  -     Urine Culture - Urine, Urine, Clean Catch    3. Left upper quadrant abdominal pain  -     CT Abdomen Pelvis With Contrast; Future  -     Lipase  -     CBC & Differential  -     Comprehensive Metabolic Panel  -     Amylase    4. Bilirubinuria  -     CT Abdomen Pelvis With Contrast; Future  -     Lipase  -     CBC & Differential  -     Comprehensive Metabolic Panel  -     Amylase  -     Urine Culture - Urine, Urine, Clean Catch; Future  -     Urine Culture - Urine, Urine, Clean Catch        Follow Up   No follow-ups on file.  Patient was given instructions and counseling regarding her condition or for health maintenance advice. Please see specific information pulled into the AVS if appropriate.     Transcribed from ambient dictation for Meredith Lea Kehrer, MD by Mary Hayes .  02/08/22   20:15 EST    Patient verbalized consent to the visit recording.  I have personally performed the services described in this document as transcribed by the above individual, and it is both accurate and  complete.  Meredith Lea Kehrer, MD  2/9/2022  09:07 EST

## 2022-02-09 LAB
ALBUMIN SERPL-MCNC: 4.1 G/DL (ref 3.8–4.8)
ALBUMIN/GLOB SERPL: 1.9 {RATIO} (ref 1.2–2.2)
ALP SERPL-CCNC: 83 IU/L (ref 44–121)
ALT SERPL-CCNC: 12 IU/L (ref 0–32)
AMYLASE SERPL-CCNC: 41 U/L (ref 31–110)
AST SERPL-CCNC: 18 IU/L (ref 0–40)
BASOPHILS # BLD AUTO: 0.1 X10E3/UL (ref 0–0.2)
BASOPHILS NFR BLD AUTO: 1 %
BILIRUB SERPL-MCNC: 0.3 MG/DL (ref 0–1.2)
BUN SERPL-MCNC: 24 MG/DL (ref 8–27)
BUN/CREAT SERPL: 26 (ref 12–28)
CALCIUM SERPL-MCNC: 9.1 MG/DL (ref 8.7–10.3)
CHLORIDE SERPL-SCNC: 101 MMOL/L (ref 96–106)
CO2 SERPL-SCNC: 26 MMOL/L (ref 20–29)
CREAT SERPL-MCNC: 0.91 MG/DL (ref 0.57–1)
EOSINOPHIL # BLD AUTO: 0.3 X10E3/UL (ref 0–0.4)
EOSINOPHIL NFR BLD AUTO: 3 %
ERYTHROCYTE [DISTWIDTH] IN BLOOD BY AUTOMATED COUNT: 13 % (ref 11.7–15.4)
GLOBULIN SER CALC-MCNC: 2.2 G/DL (ref 1.5–4.5)
GLUCOSE SERPL-MCNC: 91 MG/DL (ref 65–99)
HCT VFR BLD AUTO: 42.8 % (ref 34–46.6)
HGB BLD-MCNC: 14.3 G/DL (ref 11.1–15.9)
IMM GRANULOCYTES # BLD AUTO: 0 X10E3/UL (ref 0–0.1)
IMM GRANULOCYTES NFR BLD AUTO: 0 %
LIPASE SERPL-CCNC: 20 U/L (ref 14–72)
LYMPHOCYTES # BLD AUTO: 3.6 X10E3/UL (ref 0.7–3.1)
LYMPHOCYTES NFR BLD AUTO: 39 %
MCH RBC QN AUTO: 29.4 PG (ref 26.6–33)
MCHC RBC AUTO-ENTMCNC: 33.4 G/DL (ref 31.5–35.7)
MCV RBC AUTO: 88 FL (ref 79–97)
MONOCYTES # BLD AUTO: 1.3 X10E3/UL (ref 0.1–0.9)
MONOCYTES NFR BLD AUTO: 14 %
NEUTROPHILS # BLD AUTO: 4.1 X10E3/UL (ref 1.4–7)
NEUTROPHILS NFR BLD AUTO: 43 %
PLATELET # BLD AUTO: 267 X10E3/UL (ref 150–450)
POTASSIUM SERPL-SCNC: 4.2 MMOL/L (ref 3.5–5.2)
PROT SERPL-MCNC: 6.3 G/DL (ref 6–8.5)
RBC # BLD AUTO: 4.87 X10E6/UL (ref 3.77–5.28)
SODIUM SERPL-SCNC: 141 MMOL/L (ref 134–144)
WBC # BLD AUTO: 9.4 X10E3/UL (ref 3.4–10.8)

## 2022-02-10 ENCOUNTER — HOSPITAL ENCOUNTER (OUTPATIENT)
Dept: INFUSION THERAPY | Facility: HOSPITAL | Age: 72
Discharge: HOME | End: 2022-02-10
Admitting: ALLERGY & IMMUNOLOGY

## 2022-02-10 VITALS
SYSTOLIC BLOOD PRESSURE: 126 MMHG | RESPIRATION RATE: 20 BRPM | TEMPERATURE: 96.4 F | DIASTOLIC BLOOD PRESSURE: 79 MMHG | HEART RATE: 72 BPM | OXYGEN SATURATION: 95 %

## 2022-02-10 DIAGNOSIS — J45.40 MODERATE PERSISTENT ASTHMA, UNSPECIFIED WHETHER COMPLICATED: Primary | ICD-10-CM

## 2022-02-10 LAB
BACTERIA UR CULT: ABNORMAL
OTHER ANTIBIOTIC SUSC ISLT: ABNORMAL

## 2022-02-10 PROCEDURE — 96372 THER/PROPH/DIAG INJ SC/IM: CPT

## 2022-02-10 PROCEDURE — 25010000002 OMALIZUMAB PER 5 MG: Performed by: ALLERGY & IMMUNOLOGY

## 2022-02-10 RX ADMIN — OMALIZUMAB 150 MG: 202.5 INJECTION, SOLUTION SUBCUTANEOUS at 10:02

## 2022-02-15 ENCOUNTER — HOSPITAL ENCOUNTER (OUTPATIENT)
Dept: CT IMAGING | Facility: HOSPITAL | Age: 72
Discharge: HOME | End: 2022-02-15

## 2022-02-15 ENCOUNTER — HOSPITAL ENCOUNTER (OUTPATIENT)
Dept: GENERAL RADIOLOGY | Facility: HOSPITAL | Age: 72
Discharge: HOME | End: 2022-02-15

## 2022-02-15 DIAGNOSIS — R82.2 BILIRUBINURIA: ICD-10-CM

## 2022-02-15 DIAGNOSIS — R10.12 LEFT UPPER QUADRANT ABDOMINAL PAIN: ICD-10-CM

## 2022-02-15 DIAGNOSIS — R07.81 PAIN IN RIB: ICD-10-CM

## 2022-02-15 PROCEDURE — 71101 X-RAY EXAM UNILAT RIBS/CHEST: CPT

## 2022-02-15 PROCEDURE — 74177 CT ABD & PELVIS W/CONTRAST: CPT

## 2022-02-15 PROCEDURE — 25010000002 IOPAMIDOL 61 % SOLUTION: Performed by: FAMILY MEDICINE

## 2022-02-15 RX ADMIN — IOPAMIDOL 100 ML: 612 INJECTION, SOLUTION INTRAVENOUS at 12:22

## 2022-02-23 DIAGNOSIS — Z98.84 S/P GASTRIC BYPASS: Primary | ICD-10-CM

## 2022-02-23 DIAGNOSIS — R82.2 BILIRUBINURIA: ICD-10-CM

## 2022-02-23 DIAGNOSIS — R10.12 LEFT UPPER QUADRANT ABDOMINAL PAIN: ICD-10-CM

## 2022-03-10 ENCOUNTER — INFUSION (OUTPATIENT)
Dept: ONCOLOGY | Facility: HOSPITAL | Age: 72
End: 2022-03-10

## 2022-03-10 VITALS
WEIGHT: 208.4 LBS | SYSTOLIC BLOOD PRESSURE: 136 MMHG | TEMPERATURE: 97.7 F | DIASTOLIC BLOOD PRESSURE: 82 MMHG | HEART RATE: 86 BPM | OXYGEN SATURATION: 100 % | BODY MASS INDEX: 35.58 KG/M2 | HEIGHT: 64 IN | RESPIRATION RATE: 18 BRPM

## 2022-03-10 DIAGNOSIS — J45.40 MODERATE PERSISTENT ASTHMA, UNSPECIFIED WHETHER COMPLICATED: Primary | ICD-10-CM

## 2022-03-10 PROCEDURE — 25010000002 OMALIZUMAB 150 MG/ML SOLUTION PREFILLED SYRINGE: Performed by: ALLERGY & IMMUNOLOGY

## 2022-03-10 PROCEDURE — 96372 THER/PROPH/DIAG INJ SC/IM: CPT

## 2022-03-10 RX ADMIN — OMALIZUMAB 150 MG: 150 INJECTION, SOLUTION SUBCUTANEOUS at 10:52

## 2022-03-10 NOTE — NURSING NOTE
Patient tolerated xolair injection without difficulty, and monitored for 30 minutes post injections with no S/S of reaction. Instructed to call her prescribing MD for any concerns prior to next appt. Discharged per ambulation with VSS.

## 2022-03-30 ENCOUNTER — OFFICE VISIT (OUTPATIENT)
Dept: FAMILY MEDICINE CLINIC | Facility: CLINIC | Age: 72
End: 2022-03-30

## 2022-03-30 VITALS
BODY MASS INDEX: 35.44 KG/M2 | HEART RATE: 66 BPM | TEMPERATURE: 97.1 F | DIASTOLIC BLOOD PRESSURE: 76 MMHG | HEIGHT: 64 IN | OXYGEN SATURATION: 97 % | WEIGHT: 207.6 LBS | SYSTOLIC BLOOD PRESSURE: 132 MMHG

## 2022-03-30 DIAGNOSIS — K21.9 GASTROESOPHAGEAL REFLUX DISEASE WITHOUT ESOPHAGITIS: Chronic | ICD-10-CM

## 2022-03-30 DIAGNOSIS — J45.40 MODERATE PERSISTENT ASTHMA, UNSPECIFIED WHETHER COMPLICATED: ICD-10-CM

## 2022-03-30 DIAGNOSIS — R10.12 LEFT UPPER QUADRANT ABDOMINAL PAIN: ICD-10-CM

## 2022-03-30 DIAGNOSIS — E03.9 HYPOTHYROIDISM, UNSPECIFIED TYPE: ICD-10-CM

## 2022-03-30 DIAGNOSIS — R10.9 LEFT FLANK PAIN: ICD-10-CM

## 2022-03-30 DIAGNOSIS — Z98.84 S/P GASTRIC BYPASS: ICD-10-CM

## 2022-03-30 DIAGNOSIS — E78.5 HYPERLIPIDEMIA, UNSPECIFIED HYPERLIPIDEMIA TYPE: ICD-10-CM

## 2022-03-30 DIAGNOSIS — I10 ESSENTIAL HYPERTENSION: Primary | ICD-10-CM

## 2022-03-30 DIAGNOSIS — F32.A DEPRESSION, UNSPECIFIED DEPRESSION TYPE: ICD-10-CM

## 2022-03-30 PROCEDURE — 99214 OFFICE O/P EST MOD 30 MIN: CPT | Performed by: FAMILY MEDICINE

## 2022-03-30 RX ORDER — ESOMEPRAZOLE MAGNESIUM 40 MG/1
40 CAPSULE, DELAYED RELEASE ORAL 2 TIMES DAILY
Qty: 180 CAPSULE | Refills: 3 | Status: SHIPPED | OUTPATIENT
Start: 2022-03-30 | End: 2023-03-17

## 2022-03-30 RX ORDER — PREDNISONE 20 MG/1
20 TABLET ORAL 2 TIMES DAILY
Qty: 10 TABLET | Refills: 0 | Status: SHIPPED | OUTPATIENT
Start: 2022-03-30 | End: 2022-04-04

## 2022-03-30 NOTE — PROGRESS NOTES
"Chief Complaint  Med Refill and Hypertension    Subjective          Octavia Pendleton presents to [facility]  History of Present Illness    The patient presents today for a follow-up of hypertension and other medical issues.     Left upper quadrant, flank and back pain  The patient states her back pain has not improved. She underwent a CT of her abdomen that revealed a 3 mm non-obstructing left kidney stone. The CT also revealed ground-glass appearance of her left lower lobe. She was referred to a gastric surgeon at her last appointment. The patient reports that she was sent paperwork to complete and states she spoke with them this week and was informed that the paperwork will be reviewed by the physicians to see if it is appropriate for her to have an appointment with the gastric surgeon. She has a history of gastric bypass surgery. The patient states the worst pain is \"right here\" and notes it is sore to touch. She reports that when the pain is exacerbated, it radiates anteriorly. She reports she is unable to ambulate up stairs or at an incline secondary to the pain. She notes getting a shower and washing her hair exacerbates the pain. She state she has to perform most activities with her left arm as her right arm does not work. She also underwent an x-ray of her ribs, which was normal. The patient reports that her pain management specialist does not believe that this pain is related to her lumbar spine disc issues.     Low back pain  The patient follows up with pain management for the same. She states pain management is effective in treating \"the actual spine stuff.\" She underwent an MRI of her lumbar spine at [facility], which is going to be reviewed at her next appointment.     Medication review  The patient reports compliance with all of her other medications. She does not need refills on any of her medications. She notes her Nexium will need to be send to a [employer] " "mail order pharmacy, which is [facility]. All of her other prescriptions will go to [facility].     Lung disease  The patient states she has been having a difficult time in regards to her lung disease for the past 3 weeks. She reports a cough, allergies and congestion. She uses nasal spray and inhalers for the same. She has used Singular in the past and believes she had an adverse reaction, but she cannot recall why she did not tolerate this.     Immunizations  The patient received the COVID-19 vaccines and booster.     Review of systems:  A review of systems was performed, and positive findings are noted in the HPI.    Objective   Vital Signs:   /76   Pulse 66   Temp 97.1 °F (36.2 °C)   Ht 162.6 cm (64\")   Wt 94.2 kg (207 lb 9.6 oz)   SpO2 97%   BMI 35.63 kg/m²            Physical Exam  Constitutional:       General: She is not in acute distress.     Appearance: Normal appearance. She is well-developed.   HENT:      Head: Normocephalic and atraumatic.      Right Ear: Tympanic membrane, ear canal and external ear normal.      Left Ear: Tympanic membrane, ear canal and external ear normal.      Mouth/Throat:      Mouth: Mucous membranes are moist.      Pharynx: Oropharynx is clear.      Comments: Clear postnasal drainage.   Eyes:      Conjunctiva/sclera: Conjunctivae normal.      Pupils: Pupils are equal, round, and reactive to light.   Neck:      Thyroid: No thyromegaly.   Cardiovascular:      Rate and Rhythm: Normal rate and regular rhythm.      Heart sounds: No murmur heard.  Pulmonary:      Effort: Pulmonary effort is normal.      Breath sounds: Wheezing (Mild end expiratory wheezes bilaterally) present.   Abdominal:      General: Bowel sounds are normal.      Palpations: Abdomen is soft.      Tenderness: There is no abdominal tenderness.   Musculoskeletal:         General: Normal range of motion.      Cervical back: Neck supple.   Lymphadenopathy:      Cervical: No cervical " adenopathy.   Skin:     General: Skin is warm and dry.   Neurological:      Mental Status: She is alert and oriented to person, place, and time.   Psychiatric:         Mood and Affect: Mood normal.         Behavior: Behavior normal.        Result Review :            CT Abdomen Pelvis With Contrast (02/15/2022 12:27)  XR Ribs Left With PA Chest (02/15/2022 12:45)       Assessment and Plan    Diagnoses and all orders for this visit:    1. Essential hypertension (Primary)  -     Comprehensive Metabolic Panel    2. Hyperlipidemia, unspecified hyperlipidemia type  -     Lipid Panel  -     Comprehensive Metabolic Panel    3. Hypothyroidism, unspecified type  -     TSH    4. Left flank pain  -     XR Spine Thoracic 3 View; Future    5. S/P gastric bypass    6. Left upper quadrant abdominal pain    7. Depression, unspecified depression type    8. Moderate persistent asthma, unspecified whether complicated  -     predniSONE (DELTASONE) 20 MG tablet; Take 1 tablet by mouth 2 (Two) Times a Day for 5 days.  Dispense: 10 tablet; Refill: 0    9. Gastroesophageal reflux disease without esophagitis  -     esomeprazole (nexIUM) 40 MG capsule; Take 1 capsule by mouth 2 (Two) Times a Day.  Dispense: 180 capsule; Refill: 3      1. Left upper quadrant and flank pain  - We are going to obtain an x-ray of her thoracic spine to rule out severe arthritis in her thoracic spine.     2. Asthma  - Wheezing is present on physical exam, so I will prescribe a course of steroids.     3. Hypertension  - Currently well-controlled. She will continue current medication regimen.  - We are going to obtain labs to monitor her thyroid and cholesterol.     Answers for HPI/ROS submitted by the patient on 3/29/2022  Please describe your symptoms.: Medicare annual check-up.  Back and side pain.  Have you had these symptoms before?: Yes  How long have you been having these symptoms?: Greater than 2 weeks  Please list any medications you are currently taking for  this condition.: NA  Please describe any probable cause for these symptoms. : NA  What is the primary reason for your visit?: Other        Follow Up   Return in about 6 months (around 9/30/2022) for Medicare Wellness.  Patient was given instructions and counseling regarding her condition or for health maintenance advice. Please see specific information pulled into the AVS if appropriate.     Transcribed from ambient dictation for Meredith Lea Kehrer, MD by Maribell Buckner.  03/30/22   15:00 EDT    Patient verbalized consent to the visit recording.  I have personally performed the services described in this document as transcribed by the above individual, and it is both accurate and complete.  Meredith Lea Kehrer, MD  3/31/2022  12:45 EDT

## 2022-03-31 DIAGNOSIS — R10.9 LEFT FLANK PAIN: ICD-10-CM

## 2022-03-31 LAB
ALBUMIN SERPL-MCNC: 4 G/DL (ref 3.8–4.8)
ALBUMIN/GLOB SERPL: 1.5 {RATIO} (ref 1.2–2.2)
ALP SERPL-CCNC: 79 IU/L (ref 44–121)
ALT SERPL-CCNC: 13 IU/L (ref 0–32)
AST SERPL-CCNC: 17 IU/L (ref 0–40)
BILIRUB SERPL-MCNC: 0.5 MG/DL (ref 0–1.2)
BUN SERPL-MCNC: 23 MG/DL (ref 8–27)
BUN/CREAT SERPL: 27 (ref 12–28)
CALCIUM SERPL-MCNC: 9.3 MG/DL (ref 8.7–10.3)
CHLORIDE SERPL-SCNC: 102 MMOL/L (ref 96–106)
CHOLEST SERPL-MCNC: 234 MG/DL (ref 100–199)
CO2 SERPL-SCNC: 25 MMOL/L (ref 20–29)
CREAT SERPL-MCNC: 0.84 MG/DL (ref 0.57–1)
EGFRCR SERPLBLD CKD-EPI 2021: 75 ML/MIN/1.73
GLOBULIN SER CALC-MCNC: 2.6 G/DL (ref 1.5–4.5)
GLUCOSE SERPL-MCNC: 91 MG/DL (ref 65–99)
HDLC SERPL-MCNC: 70 MG/DL
LDLC SERPL CALC-MCNC: 144 MG/DL (ref 0–99)
POTASSIUM SERPL-SCNC: 4.1 MMOL/L (ref 3.5–5.2)
PROT SERPL-MCNC: 6.6 G/DL (ref 6–8.5)
SODIUM SERPL-SCNC: 140 MMOL/L (ref 134–144)
TRIGL SERPL-MCNC: 113 MG/DL (ref 0–149)
TSH SERPL DL<=0.005 MIU/L-ACNC: 0.59 UIU/ML (ref 0.45–4.5)
VLDLC SERPL CALC-MCNC: 20 MG/DL (ref 5–40)

## 2022-04-07 ENCOUNTER — INFUSION (OUTPATIENT)
Dept: ONCOLOGY | Facility: HOSPITAL | Age: 72
End: 2022-04-07

## 2022-04-07 VITALS — DIASTOLIC BLOOD PRESSURE: 78 MMHG | SYSTOLIC BLOOD PRESSURE: 133 MMHG | HEART RATE: 79 BPM

## 2022-04-07 DIAGNOSIS — J45.40 MODERATE PERSISTENT ASTHMA, UNSPECIFIED WHETHER COMPLICATED: Primary | ICD-10-CM

## 2022-04-07 PROCEDURE — 96372 THER/PROPH/DIAG INJ SC/IM: CPT

## 2022-04-07 PROCEDURE — 25010000002 OMALIZUMAB 150 MG/ML SOLUTION PREFILLED SYRINGE: Performed by: ALLERGY & IMMUNOLOGY

## 2022-04-07 RX ADMIN — OMALIZUMAB 150 MG: 150 INJECTION, SOLUTION SUBCUTANEOUS at 14:43

## 2022-04-07 NOTE — NURSING NOTE
Patient tolerated xolair injections without difficulty, and monitored for 30 minutes post injections with no S/S of reaction. Instructed to call her prescribing MD for any concerns prior to next appt. Discharged per ambulation with VSS.

## 2022-05-05 ENCOUNTER — INFUSION (OUTPATIENT)
Dept: ONCOLOGY | Facility: HOSPITAL | Age: 72
End: 2022-05-05

## 2022-05-05 VITALS
TEMPERATURE: 97.6 F | OXYGEN SATURATION: 96 % | BODY MASS INDEX: 35.65 KG/M2 | HEART RATE: 71 BPM | RESPIRATION RATE: 18 BRPM | DIASTOLIC BLOOD PRESSURE: 73 MMHG | HEIGHT: 64 IN | SYSTOLIC BLOOD PRESSURE: 152 MMHG | WEIGHT: 208.8 LBS

## 2022-05-05 DIAGNOSIS — J45.40 MODERATE PERSISTENT ASTHMA, UNSPECIFIED WHETHER COMPLICATED: Primary | ICD-10-CM

## 2022-05-05 PROCEDURE — 25010000002 OMALIZUMAB 150 MG/ML SOLUTION PREFILLED SYRINGE: Performed by: ALLERGY & IMMUNOLOGY

## 2022-05-05 PROCEDURE — 96372 THER/PROPH/DIAG INJ SC/IM: CPT

## 2022-05-05 RX ADMIN — OMALIZUMAB 150 MG: 150 INJECTION, SOLUTION SUBCUTANEOUS at 14:12

## 2022-05-05 NOTE — NURSING NOTE
Patient tolerated xolair injection in left arm without difficulty, and monitored for 30 minutes post injections with no S/S of reaction. Instructed to call her prescribing MD for any concerns prior to next appt. Discharged per ambulation with VSS.

## 2022-06-02 ENCOUNTER — INFUSION (OUTPATIENT)
Dept: ONCOLOGY | Facility: HOSPITAL | Age: 72
End: 2022-06-02

## 2022-06-02 VITALS
SYSTOLIC BLOOD PRESSURE: 109 MMHG | HEART RATE: 77 BPM | BODY MASS INDEX: 35.82 KG/M2 | WEIGHT: 209.8 LBS | TEMPERATURE: 97.8 F | RESPIRATION RATE: 18 BRPM | DIASTOLIC BLOOD PRESSURE: 68 MMHG | HEIGHT: 64 IN | OXYGEN SATURATION: 95 %

## 2022-06-02 DIAGNOSIS — J45.40 MODERATE PERSISTENT ASTHMA, UNSPECIFIED WHETHER COMPLICATED: Primary | ICD-10-CM

## 2022-06-02 PROCEDURE — 96372 THER/PROPH/DIAG INJ SC/IM: CPT

## 2022-06-02 PROCEDURE — 25010000002 OMALIZUMAB 150 MG/ML SOLUTION PREFILLED SYRINGE: Performed by: ALLERGY & IMMUNOLOGY

## 2022-06-02 RX ADMIN — OMALIZUMAB 150 MG: 150 INJECTION, SOLUTION SUBCUTANEOUS at 13:48

## 2022-06-02 NOTE — NURSING NOTE
.Patient tolerated xolair injection in left arm without difficulty, and monitored for 30 minutes post injections with no S/S of reaction. Instructed to call her prescribing MD for any concerns prior to next appt. Discharged per ambulation with VSS.

## 2022-06-06 DIAGNOSIS — Z98.84 S/P GASTRIC BYPASS: ICD-10-CM

## 2022-06-06 RX ORDER — CYANOCOBALAMIN 1000 UG/ML
INJECTION, SOLUTION INTRAMUSCULAR; SUBCUTANEOUS
Qty: 3 ML | Refills: 3 | Status: SHIPPED | OUTPATIENT
Start: 2022-06-06 | End: 2022-09-26 | Stop reason: SDUPTHER

## 2022-06-16 ENCOUNTER — OFFICE VISIT (OUTPATIENT)
Dept: BARIATRICS/WEIGHT MGMT | Facility: CLINIC | Age: 72
End: 2022-06-16

## 2022-06-16 VITALS
BODY MASS INDEX: 36.5 KG/M2 | RESPIRATION RATE: 18 BRPM | HEART RATE: 68 BPM | TEMPERATURE: 98 F | SYSTOLIC BLOOD PRESSURE: 130 MMHG | HEIGHT: 63 IN | WEIGHT: 206 LBS | DIASTOLIC BLOOD PRESSURE: 75 MMHG

## 2022-06-16 DIAGNOSIS — E66.9 OBESITY, CLASS II, BMI 35-39.9: Primary | ICD-10-CM

## 2022-06-16 DIAGNOSIS — R10.9 LEFT FLANK PAIN: ICD-10-CM

## 2022-06-16 DIAGNOSIS — Z98.890 S/P ABDOMINOPLASTY: ICD-10-CM

## 2022-06-16 DIAGNOSIS — E51.9 THIAMINE DEFICIENCY: ICD-10-CM

## 2022-06-16 DIAGNOSIS — Z98.84 S/P GASTRIC BYPASS: ICD-10-CM

## 2022-06-16 PROBLEM — E66.811 CLASS 1 OBESITY: Status: RESOLVED | Noted: 2021-10-28 | Resolved: 2022-06-16

## 2022-06-16 PROCEDURE — 99203 OFFICE O/P NEW LOW 30 MIN: CPT | Performed by: SURGERY

## 2022-06-16 NOTE — PROGRESS NOTES
[facility] BARIATRIC [facility] BARIATRIC SURGERY  [address] [facility] [address]  [facility] [id number] [phone]  [address] [id number] [phone]  Dept: [phone]  6/16/2022      Octavia Pendleton.  [id number]  [id number]  1950  female      Chief Complaint   Patient presents with   • Follow-up     New patient rny        Post-Op Bariatric Surgery:   Octavia Pendleton is status post Laparoscopic RNY Bypass procedure, performed on 2010 at [facility].    HPI:   Today's weight is 93.4 kg (206 lb) pounds, today's BMI is Body mass index is 36.32 kg/m².. @ greatest weight loss from surgery was 85 pounds. The patient reports an unwanted weight gain of 10 pounds.  [unfilled] denies fever, chills, chest pain, SOA, melena, hematochezia, hematemesis, dysuria, frequency, hematuria, jaundice.    71-year-old female status post laparoscopic Naa-en-Y gastric bypass by Dr. Noriega 2010 who was referred to us with her history of gastric bypass and because of left flank pain.  She is undergone extensive work-up including CT scan of the abdomen which was unremarkable.  There was no evidence of internal hernia or any bowel obstruction.  Symptoms describe it over the left kidney but has increase of pain with certain movements and also walking a different elevations like going upstairs.  It sounds musculoskeletal.  She does take nonsteroidal medication which alternates meloxicam and Celebrex.  She is been taking this for her joint pain.  She has not been on any type of muscle relaxer.  She did have a urinalysis last fall which was dirty but was not consistent with urinary tract infection requiring antibiotics per patient.  She does get her vitamin levels checked by her primary care physician and all have been good.      Supplements: Multivitamin with iron, calcium, B12    Review of Systems   Constitutional: Positive for fatigue.   Musculoskeletal: Positive for  arthralgias and back pain.   All other systems reviewed and are negative.      Patient Active Problem List   Diagnosis   • Asthma, moderate persistent   • Observed sleep apnea   • Hypersomnia   • Chronic fatigue   • Non-restorative sleep   • Psychophysiological insomnia   • S/P abdominoplasty   • Panniculus   • Hyperlipidemia   • Hypothyroidism   • Vitamin B12 deficiency   • Vitamin D deficiency   • Thiamine deficiency   • S/P gastric bypass   • GERD (gastroesophageal reflux disease)   • Depression   • Snoring   • Morning headache   • Left flank pain       Past Medical History:   Diagnosis Date   • Abnormal EEG    • Acute upper respiratory infection    • Allergic conjunctivitis    • Allergic rhinitis    • Anemia    • Anesthesia complication     HARD TO AWAKEN   • Arthritis    • Asthma    • Atopic dermatitis    • BMI 40.0-44.9, adult ([facility])    • Bronchitis    • Cataract    • Cervical neuritis    • Cervical stenosis of spine    • Cervicalgia    • Chronic obstructive asthma with acute exacerbation ([facility])    • DDD (degenerative disc disease), lumbar    • Depression    • Disease of thyroid gland    • Dizzinesses    • Dysphagia     RESOLVED AFTER SURG   • Erosive esophagitis    • Facet arthritis of cervical region    • Generalized osteoarthritis of multiple sites    • GERD (gastroesophageal reflux disease)    • Hemangioma    • History of esophageal stricture    • HL (hearing loss)    • Hypercholesteremia    • Hyperlipidemia    • Hypertension    • Hypokalemia    • Hypothyroidism    • Influenza A with respiratory manifestations    • Internal hemorrhoids    • Intestinal malabsorption    • Low back pain    • Mixed hyperlipidemia    • Multiple joint complaints    • Need for prophylactic vaccination against Streptococcus pneumoniae (pneumococcus) and influenza    • Neoplasm of uncertain behavior of skin    • JAY on CPAP     NEW SLEEP TESTDONE SEPT, TRIAL OF NOT WEARING CPAP   • Osteopenia of lumbar spine    • Osteopenia of spine     • Osteoporosis    • Pain in arm    • Panniculitis    • PONV (postoperative nausea and vomiting)    • Screening for malignant neoplasm of breast    • Screening for malignant neoplasm of cervix    • Sleep apnea    • SOB (shortness of breath)    • Thiamine deficiency    • TIA (transient ischemic attack)    • Vitamin B deficiency    • Vitamin B12 deficiency    • Vitamin D deficiency    • Wears glasses        Past Surgical History:   Procedure Laterality Date   • ABDOMINOPLASTY N/A 11/01/2019    Procedure: ABDOMINOPLASTY;  Surgeon: Waterhouse, Maurine, MD;  Location: [facility] OR;  Service: Plastics   • BARIATRIC SURGERY     • CARDIAC CATHETERIZATION  10/2009    Abnormal blood vessels   • CARPAL TUNNEL RELEASE Left    • CERVICAL DISC SURGERY  04/21/2016    bones spurs removed as well   • CERVICAL SPINE SURGERY  04/20/2017   • CHOLECYSTECTOMY     • CLOSED REDUCTION WRIST FRACTURE  09/02/2021   • COLONOSCOPY     • DIAGNOSTIC LAPAROSCOPY     • ELBOW PROCEDURE Left     release of nerve similar to carpal tunnel   • EPIDURAL BLOCK      every 3 months.    • EYE SURGERY Left 2000    CYST, CATARACT   • FRACTURE SURGERY     • GASTRIC BYPASS  10/2010       • HAND SURGERY Right    • HEMORRHOIDECTOMY     • HYSTERECTOMY     • INGUINAL HERNIA REPAIR Bilateral    • JOINT REPLACEMENT     • OOPHORECTOMY     • PANNICULECTOMY N/A 11/01/2019    Procedure: PANNICULECTOMY;  Surgeon: Waterhouse, Maurine, MD;  Location: [facility] OR;  Service: Plastics   • REPLACEMENT TOTAL KNEE BILATERAL     • RIB FRACTURE SURGERY      1ST RIB REMOVED, DUE NUMBNESS IN ARM   • ROTATOR CUFF REPAIR Bilateral    • SPINE SURGERY         Allergies   Allergen Reactions   • Codeine Hallucinations         Current Outpatient Medications:   •  albuterol (PROVENTIL HFA;VENTOLIN HFA) 108 (90 BASE) MCG/ACT inhaler, Inhale 2 puffs every 4 (four) hours as needed for wheezing., Disp: , Rfl:   •  azelastine (ASTELIN) 0.1 % nasal spray, 1 spray into the nostril(s)  as directed by provider 2 (Two) Times a Day. Use in each nostril as directed, Disp: 30 mL, Rfl: 2  •  azelastine (OPTIVAR) 0.05 % ophthalmic solution, Administer 1 drop to both eyes 2 (Two) Times a Day., Disp: 6 each, Rfl: 3  •  celecoxib (CeleBREX) 200 MG capsule, Take 1 capsule by mouth Daily., Disp: 90 capsule, Rfl: 3  •  cyanocobalamin 1000 MCG/ML injection, INJECT 1 ML INTO THE APPROPRIATE MUSCLE AS DIRECTED BY PRESCRIBER EVERY 28 DAYS, Disp: 3 mL, Rfl: 3  •  EPINEPHrine (EPIPEN) 0.3 MG/0.3ML solution auto-injector injection, , Disp: , Rfl:   •  escitalopram (LEXAPRO) 20 MG tablet, Take 1 tablet by mouth Daily., Disp: 90 tablet, Rfl: 3  •  esomeprazole (nexIUM) 40 MG capsule, Take 1 capsule by mouth 2 (Two) Times a Day., Disp: 180 capsule, Rfl: 3  •  ferrous sulfate 325 (65 FE) MG tablet, Take 1 tablet by mouth Daily With Breakfast., Disp: 90 tablet, Rfl: 3  •  fexofenadine (ALLEGRA) 180 MG tablet, Take 180 mg by mouth daily., Disp: , Rfl:   •  fluticasone (FLONASE) 50 MCG/ACT nasal spray, 2 sprays into the nostril(s) as directed by provider Daily. Administer 2 sprays in each nostril for each dose., Disp: , Rfl:   •  Fluticasone Furoate-Vilanterol (BREO ELLIPTA IN), Inhale 2 puffs Daily., Disp: , Rfl:   •  Fluzone High-Dose Quadrivalent 0.7 ML suspension prefilled syringe injection, , Disp: , Rfl:   •  hydroCHLOROthiazide (HYDRODIURIL) 12.5 MG tablet, Take 1 tablet by mouth Daily., Disp: 90 tablet, Rfl: 3  •  levalbuterol (XOPENEX) 0.63 MG/3ML nebulizer solution, Take 1 ampule by nebulization every 4 (four) hours as needed for wheezing., Disp: , Rfl:   •  levothyroxine (SYNTHROID, LEVOTHROID) 50 MCG tablet, Take 1 tablet by mouth Daily., Disp: 90 tablet, Rfl: 3  •  melatonin 5 MG tablet tablet, Take 5 mg by mouth., Disp: , Rfl:   •  Meloxicam 5 MG capsule, Take  by mouth., Disp: , Rfl:   •  omalizumab (XOLAIR) 150 MG injection, Inject 150 mg under the skin every 30 (thirty) days., Disp: , Rfl:   •  potassium  chloride (KLOR-CON) 20 MEQ CR tablet, Take 1 tablet by mouth 2 (Two) Times a Day., Disp: 180 tablet, Rfl: 3  •  Probiotic Product (FLORAJEN3) capsule, Take 1 tablet by mouth daily., Disp: , Rfl:   •  thiamine (B-1) 100 MG/ML injection, Inject 2 mL into the appropriate muscle as directed by prescriber Every 30 (Thirty) Days. (Patient taking differently: Inject 200 mg into the appropriate muscle as directed by prescriber Every 3 (Three) Months.), Disp: 6 mL, Rfl: 0    Current Facility-Administered Medications:   •  cyanocobalamin injection 1,000 mcg, 1,000 mcg, Intramuscular, Q28 Days, Kehrer, Meredith Lea, MD, 1,000 mcg at 03/16/21 1515    Social History     Socioeconomic History   • Marital status:    Tobacco Use   • Smoking status: Never Smoker   • Smokeless tobacco: Never Used   Vaping Use   • Vaping Use: Never used   Substance and Sexual Activity   • Alcohol use: Yes     Alcohol/week: 1.0 standard drink     Types: 1 Glasses of wine per week     Comment: rare use   • Drug use: No   • Sexual activity: Not Currently     Partners: Male       Family History   Problem Relation Age of Onset   • Arthritis Mother    • Diabetes Mother    • Heart disease Mother    • Vision loss Mother    • Vasculitis Father         Cerebral   • Coronary artery disease Father    • Early death Father    • Hearing loss Father    • Heart disease Father    • Cancer Sister    • Diabetes Sister    • Hypertension Sister    • Diabetes Sister    • Diabetes Brother    • Diabetes Brother    • Breast cancer Paternal Aunt    • Diabetes Other    • Malig Hyperthermia Neg Hx        The following portions of the patient's history were reviewed and updated as appropriate: allergies, current medications, past family history, past medical history, past social history, past surgical history and problem list.    Vitals:    06/16/22 1444   BP: 130/75   Pulse: 68   Resp: 18   Temp: 98 °F (36.7 °C)       Physical Exam  Vitals reviewed.   HENT:      Head:  Normocephalic and atraumatic.      Mouth/Throat:      Mouth: Mucous membranes are moist.      Pharynx: Oropharynx is clear.   Eyes:      General: No scleral icterus.     Extraocular Movements: Extraocular movements intact.      Conjunctiva/sclera: Conjunctivae normal.      Pupils: Pupils are equal, round, and reactive to light.   Neck:      Thyroid: No thyromegaly.   Cardiovascular:      Rate and Rhythm: Normal rate.   Pulmonary:      Effort: Pulmonary effort is normal. No respiratory distress.      Breath sounds: Normal breath sounds. No stridor. No wheezing or rhonchi.   Abdominal:      General: Bowel sounds are normal. There is no distension.      Palpations: Abdomen is soft. There is no mass.      Tenderness: There is no abdominal tenderness. There is no right CVA tenderness, left CVA tenderness, guarding or rebound.      Hernia: No hernia is present.   Musculoskeletal:         General: Tenderness present. Normal range of motion.      Cervical back: Normal range of motion and neck supple.      Comments: Tenderness left flank upon palpation   Lymphadenopathy:      Cervical: No cervical adenopathy.   Skin:     General: Skin is warm and dry.      Findings: No erythema.   Neurological:      Mental Status: She is alert and oriented to person, place, and time.   Psychiatric:         Mood and Affect: Mood normal.         Behavior: Behavior normal.         Thought Content: Thought content normal.         Judgment: Judgment normal.           Assessment:   Octavia Pendleton has severe obesity with multiple co-morbidities who would like to transfer her bariatric care to us and participate in our bariatric program.      Encounter Diagnoses   Name Primary?   • Obesity, Class II, BMI 35-39.9 Yes   • S/P gastric bypass    • Thiamine deficiency    • Left flank pain    • S/P abdominoplasty          Discussion/Summary/Plan:     71-year-old female status post laparoscopic Naa-en-Y gastric bypass with left flank pain that sounds  like musculoskeletal versus renal.  She states that she does have a floating stone that was diagnosed but should not cause her symptoms.  Her CT scan was unremarkable for any type of internal hernia bowel obstruction which would be very unlikely with her symptoms.  She denies any abdominal pain and no radiation of pain to the abdomen from the left flank.  She is very tender upon palpation on the left flank and lower back area.  I do not think adhesions would cause this type of pain and her bypass was done laparoscopically.  She did have an open cholecystectomy and open hysterectomy but again adhesions causing the symptoms is unlikely.  She does take nonsteroidal pain medicine which I instructed her that she should not do with a gastric bypass because of the risk of ulcer disease.  She does take Nexium.  We went over the side effects from gastric bypass and all questions were answered.  I recommend that she talk to her primary doctor about maybe trying a muscle relaxer to see if this would help with her symptoms.  Also possibly see an urologist.    Recommended patient be sure to eat at least three meals per day all with high lean protein, vegetables and fruit. Be sure to limit/cut back on daily simple carbohydrate intake. Discussed with the patient the recommended amount of water per day to intake. Reviewed vitamin requirements. Be sure to do routine exercise including both cardio and strength training. Recommended patient to see our dietician to go into more detail regarding diet changes.    Instructions / Recommendations: dietary counseling recommended, recommended a daily protein intake of  grams, vitamin supplements recommended, recommended exercising at least 150 minutes per week, behavior modifications recommended and instructed to call the office for concerns, questions, or problems.    The patient was instructed to follow up in as needed.     The patient was counseled regarding diet, exercise and the  surgical procedures available. Our bariatric manual was given to the patient and was discussed in detail. Dietician appointment was highly recommended as well as attending support groups.  Total time of encounter was over 35 minutes counseling the patient and going over the procedure.  Dietary changes as well as exercise were also discussed.  Time was also spent before the encounter to review their history and any documentation provided..

## 2022-06-27 ENCOUNTER — OFFICE VISIT (OUTPATIENT)
Dept: FAMILY MEDICINE CLINIC | Facility: CLINIC | Age: 72
End: 2022-06-27

## 2022-06-27 VITALS
TEMPERATURE: 97.1 F | BODY MASS INDEX: 36.68 KG/M2 | DIASTOLIC BLOOD PRESSURE: 78 MMHG | HEIGHT: 63 IN | WEIGHT: 207 LBS | OXYGEN SATURATION: 98 % | HEART RATE: 67 BPM | SYSTOLIC BLOOD PRESSURE: 132 MMHG

## 2022-06-27 DIAGNOSIS — M50.10 CERVICAL DISC DISORDER WITH RADICULOPATHY: ICD-10-CM

## 2022-06-27 DIAGNOSIS — M51.34 THORACIC DEGENERATIVE DISC DISEASE: ICD-10-CM

## 2022-06-27 DIAGNOSIS — R10.9 LEFT FLANK PAIN: Primary | ICD-10-CM

## 2022-06-27 DIAGNOSIS — Z98.1 HX OF FUSION OF CERVICAL SPINE: ICD-10-CM

## 2022-06-27 DIAGNOSIS — I10 ESSENTIAL HYPERTENSION: Chronic | ICD-10-CM

## 2022-06-27 DIAGNOSIS — E03.9 HYPOTHYROIDISM, UNSPECIFIED TYPE: Chronic | ICD-10-CM

## 2022-06-27 PROCEDURE — 99213 OFFICE O/P EST LOW 20 MIN: CPT | Performed by: FAMILY MEDICINE

## 2022-06-27 RX ORDER — METAXALONE 800 MG/1
400-800 TABLET ORAL 3 TIMES DAILY PRN
Qty: 60 TABLET | Refills: 0 | Status: SHIPPED | OUTPATIENT
Start: 2022-06-27

## 2022-06-27 RX ORDER — LEVOTHYROXINE SODIUM 0.05 MG/1
TABLET ORAL
Qty: 90 TABLET | Refills: 3 | Status: SHIPPED | OUTPATIENT
Start: 2022-06-27 | End: 2022-09-26 | Stop reason: SDUPTHER

## 2022-06-27 RX ORDER — HYDROCHLOROTHIAZIDE 12.5 MG/1
TABLET ORAL
Qty: 90 TABLET | Refills: 3 | Status: SHIPPED | OUTPATIENT
Start: 2022-06-27 | End: 2022-09-26 | Stop reason: SDUPTHER

## 2022-06-27 NOTE — PROGRESS NOTES
Answers for HPI/ROS submitted by the patient on 6/25/2022  What is the primary reason for your visit?: Back Pain  Chronicity: chronic  Onset: more than 1 month ago  Frequency: daily  Progression since onset: waxing and waning  Pain location: lumbar spine  Pain quality: aching, stabbing  Pain - numeric: 7/10  Pain is: worse during the day  Aggravated by: position, sitting, standing, twisting  abdominal pain: No  bladder incontinence: No  paresthesias: Yes  Risk factors: history of steroid use    Chief Complaint  back pain follow up from specialist  (Bariatric surgeon )    U[place]        Octavia TORIBIO BowmanKeerthi presents to [facility]  History of Present Illness  The patient presents today to follow up from her bariatric surgeon.    Left flank pain  The patient states she saw her bariatric surgeon on 06/16/2022, and it went well. She states he does not think her pain has anything to do with either the bariatric surgery or any of the other surgeries that she has had. She states he initially felt like it had to do with her kidneys. The patient states he reviewed x-rays and the diagnostics we had done. She states he suggested that she try a muscle relaxer to see if it was a muscle issue. She states the only other thing he could offer if the muscle relaxer does not work was to see a urologist. The patient states that today her pain is duller than usual, it is just a nagging discomfort at the moment. She states she may get home and may not be able to do anything because most of the time it is like a knife cutting from the inside out. She states she has not seen her pain specialist since her last visit. The patient reports that he told her that her flank pain is not something he is treating her for. She states she can not sit normally more than 30 to 45 minutes. The patient states she has pain down her buttocks down to her right knee. She states one of the doctors had told her it was the sciatic  "nerve causing it. The patient states that she does not remember which muscle relaxers she has used in the past. She states she would prefer one that is less sedating. She states her next appointment with the pain clinic is on 07/06/2022.    Neck pain  She states she has had surgery on her neck twice because a fusion did not work the first time. She states she is having constant headaches and difficulty turning her head. She states it feels like it catches and has to loosen for her to turn her head. The patient states she has felt bad for the last several months. She states she has weakness going down her left arm. She states that when she sits or holds anything any length of time, it goes to sleep.     Review of Systems:  A review of systems was performed, and pertinent findings are noted in the HPI.    Objective   Vital Signs:  /78   Pulse 67   Temp 97.1 °F (36.2 °C)   Ht 160 cm (63\")   Wt 93.9 kg (207 lb)   SpO2 98%   BMI 36.67 kg/m²   Estimated body mass index is 36.67 kg/m² as calculated from the following:    Height as of this encounter: 160 cm (63\").    Weight as of this encounter: 93.9 kg (207 lb).      Office Visit with Bj Samayoa Jr., MD (06/16/2022)      Physical Exam  Constitutional:       General: She is not in acute distress.     Appearance: Normal appearance. She is well-developed.   HENT:      Head: Normocephalic and atraumatic.      Right Ear: External ear normal.      Left Ear: External ear normal.   Eyes:      Conjunctiva/sclera: Conjunctivae normal.      Pupils: Pupils are equal, round, and reactive to light.   Neck:      Thyroid: No thyromegaly.      Comments: Severely limited cervical spine range of motion.  Pulmonary:      Effort: Pulmonary effort is normal.   Neurological:      Mental Status: She is alert and oriented to person, place, and time.   Psychiatric:         Mood and Affect: Mood normal.         Behavior: Behavior normal.        Result Review :              "   Assessment and Plan   Diagnoses and all orders for this visit:    1. Left flank pain (Primary)  Comments:  - MRI of thoracic spine without contrast ordered.  - Consider urology referral if MRI does not reveal an etiology for her left flank pain.  Orders:  -     MRI Thoracic Spine Without Contrast; Future    2. Thoracic degenerative disc disease  Comments:  - Metaxalone 800 mg 0.5 to 1 pill 3 times daily as needed.  Orders:  -     MRI Thoracic Spine Without Contrast; Future  -     metaxalone (Skelaxin) 800 MG tablet; Take 0.5-1 tablets by mouth 3 (Three) Times a Day As Needed for Muscle Spasms.  Dispense: 60 tablet; Refill: 0    3. Cervical disc disorder with radiculopathy  Comments:   - MRI cervical spine with contrast.  Orders:  -     MRI Cervical Spine With Contrast; Future  -     metaxalone (Skelaxin) 800 MG tablet; Take 0.5-1 tablets by mouth 3 (Three) Times a Day As Needed for Muscle Spasms.  Dispense: 60 tablet; Refill: 0    4. Hx of fusion of cervical spine  Comments:   - MRI cervical spine with contrast.  Orders:  -     MRI Cervical Spine With Contrast; Future             Follow Up   No follow-ups on file.  Patient was given instructions and counseling regarding her condition or for health maintenance advice. Please see specific information pulled into the AVS if appropriate.     Transcribed from ambient dictation for Meredith Lea Kehrer, MD by Annmarie Arteaga.  06/27/22   14:36 EDT    Patient verbalized consent to the visit recording.  I have personally performed the services described in this document as transcribed by the above individual, and it is both accurate and complete.  Meredith Lea Kehrer, MD  6/27/2022  16:43 EDT

## 2022-06-30 ENCOUNTER — INFUSION (OUTPATIENT)
Dept: ONCOLOGY | Facility: HOSPITAL | Age: 72
End: 2022-06-30

## 2022-06-30 VITALS
HEIGHT: 63 IN | RESPIRATION RATE: 18 BRPM | HEART RATE: 70 BPM | DIASTOLIC BLOOD PRESSURE: 66 MMHG | BODY MASS INDEX: 36.93 KG/M2 | OXYGEN SATURATION: 95 % | TEMPERATURE: 96.9 F | SYSTOLIC BLOOD PRESSURE: 105 MMHG | WEIGHT: 208.4 LBS

## 2022-06-30 DIAGNOSIS — J45.40 MODERATE PERSISTENT ASTHMA, UNSPECIFIED WHETHER COMPLICATED: Primary | ICD-10-CM

## 2022-06-30 PROCEDURE — 25010000002 OMALIZUMAB 150 MG/ML SOLUTION PREFILLED SYRINGE: Performed by: ALLERGY & IMMUNOLOGY

## 2022-06-30 PROCEDURE — 96372 THER/PROPH/DIAG INJ SC/IM: CPT

## 2022-06-30 RX ADMIN — OMALIZUMAB 150 MG: 150 INJECTION, SOLUTION SUBCUTANEOUS at 13:33

## 2022-07-07 ENCOUNTER — OFFICE VISIT (OUTPATIENT)
Dept: SLEEP MEDICINE | Facility: HOSPITAL | Age: 72
End: 2022-07-07

## 2022-07-07 VITALS
SYSTOLIC BLOOD PRESSURE: 144 MMHG | OXYGEN SATURATION: 97 % | HEART RATE: 69 BPM | WEIGHT: 210 LBS | HEIGHT: 65 IN | DIASTOLIC BLOOD PRESSURE: 86 MMHG | BODY MASS INDEX: 34.99 KG/M2

## 2022-07-07 DIAGNOSIS — Z99.89 OSA ON CPAP: Primary | ICD-10-CM

## 2022-07-07 DIAGNOSIS — E66.9 CLASS 2 OBESITY: ICD-10-CM

## 2022-07-07 DIAGNOSIS — G47.33 OSA ON CPAP: Primary | ICD-10-CM

## 2022-07-07 PROBLEM — F51.04 PSYCHOPHYSIOLOGICAL INSOMNIA: Status: RESOLVED | Noted: 2019-08-29 | Resolved: 2022-07-07

## 2022-07-07 PROBLEM — E66.812 CLASS 2 OBESITY: Status: ACTIVE | Noted: 2021-10-28

## 2022-07-07 PROBLEM — G47.8 NON-RESTORATIVE SLEEP: Status: RESOLVED | Noted: 2019-08-29 | Resolved: 2022-07-07

## 2022-07-07 PROBLEM — G47.10 HYPERSOMNIA: Status: RESOLVED | Noted: 2019-08-29 | Resolved: 2022-07-07

## 2022-07-07 PROCEDURE — G0463 HOSPITAL OUTPT CLINIC VISIT: HCPCS

## 2022-07-07 PROCEDURE — 99213 OFFICE O/P EST LOW 20 MIN: CPT | Performed by: INTERNAL MEDICINE

## 2022-07-07 NOTE — PROGRESS NOTES
"  [facility]  [address]  [address]  [place] [address]  Phone   Fax       SLEEP CLINIC FOLLOW UP PROGRESS NOTE.    Octavia Pendleton  [id number]   1950  71 y.o.  female      PCP: Kehrer, Meredith Lea, MD      Date of visit: 7/7/2022    Chief Complaint   Patient presents with   • Sleep Apnea   • Obesity       HPI:  This is a 71 y.o. years old patient is here for the management of obstructive sleep apnea.  Sleep apnea is mild in severity . Patient is using positive airway pressure therapy with auto CPAP and the symptoms of snoring, non-restorative sleep and daytime excessive sleepiness have improved significantly on the therapy. Normally goes to bed at 10 PM and wakes up at 9 AM.  The patient wakes up 2 time(s) during the night and has no problem going back to sleep.  Feels refreshed after waking up.  Patient also denies headaches and nasal congestion.     Medications and allergies are reviewed by me and documented in the encounter.     SOCIAL (habits pertaining to sleep medicine)  History tobacco use:No   History of alcohol use: 1 per week  Caffeine use: 1     REVIEW OF SYSTEMS:   Davenport Sleepiness Scale :Total score: 9   Nasal congestion:No   Dry mouth/nose:No   Post nasal drip; Yes   Acid reflux/Heartburn:Yes   Abd bloating:No   Morning headache:Yes   Anxiety:Yes   Depression:Yes    PHYSICAL EXAMINATION:  CONSTITUTIONAL:  Vitals:    07/07/22 0900   BP: 144/86   Pulse: 69   SpO2: 97%   Weight: 95.3 kg (210 lb)   Height: 165.1 cm (65\")    Body mass index is 34.95 kg/m².   NOSE: nasal passages are clear, No deformities noted   RESP SYSTEM: Not in any respiratory distress, no chest deformities noted,   CARDIOVASULAR: No edema noted  NEURO: Oriented x 3, gait normal,  Mood and affect appeared appropriate      Data reviewed:  The Smart card downloaded on 7/7/2022 has been reviewed independently by me for compliance and discussed the data with the patient. "   Compliance; 100%  More than 4 hr use, 95%  Average use of the device 8 hours and 24-minute per night  Residual AHI: 0.6 /hr (goal < 5.0 /hr)  Mask type: Full facemask  Device: Micaela  DME: [employer]      ASSESSMENT AND PLAN:  · Obstructive sleep apnea ( G 47.33).  The symptoms of sleep apnea have improved with the device and the treatment.  Patient's compliance with the device is excellent for treatment of sleep apnea.  I have independently reviewed the smart card down load and discussed with the patient the download data and encouarged the patient to continue to use the device.The residual AHI is acceptable. The device is benefiting the patient and the device is medically necessary.  Without proper control of sleep apnea and good compliance there is a increased risk for hypertension, diabetes mellitus and nonrestorative sleep with hypersomnia which can increase risk for motor vehicle accidents.  Untreated sleep apnea is also a risk factor for development of atrial fibrillation, pulmonary hypertension and stroke. The patient is also instructed to get the supplies from the DME [employer] and and change them on a regular basis.  A prescription for supplies has been sent to the [employer].  I have also discussed the good sleep hygiene habits and adequate amount of sleep needed for good health.  · Obesity  1 with BMI is Body mass index is 34.95 kg/m².. I have discuss the relationship between the weight and sleep apnea. The benefit of weight loss in reducing severity of sleep apnea was discussed. Discussed diet and exercise with the patient to achieve ideal BMI.   · Return for Annual visit with smartcard download. . Patient's questions were answered.      Erica Santa MD  Sleep Medicine.  Medical Director, [facility] sleep [facility]  7/7/2022 ,

## 2022-07-16 ENCOUNTER — HOSPITAL ENCOUNTER (OUTPATIENT)
Dept: MRI IMAGING | Facility: HOSPITAL | Age: 72
Discharge: HOME | End: 2022-07-16

## 2022-07-16 DIAGNOSIS — R10.9 LEFT FLANK PAIN: ICD-10-CM

## 2022-07-16 DIAGNOSIS — M50.10 CERVICAL DISC DISORDER WITH RADICULOPATHY: ICD-10-CM

## 2022-07-16 DIAGNOSIS — Z98.1 HX OF FUSION OF CERVICAL SPINE: ICD-10-CM

## 2022-07-16 DIAGNOSIS — M51.34 THORACIC DEGENERATIVE DISC DISEASE: ICD-10-CM

## 2022-07-16 PROCEDURE — 72141 MRI NECK SPINE W/O DYE: CPT

## 2022-07-16 PROCEDURE — 72146 MRI CHEST SPINE W/O DYE: CPT

## 2022-07-20 DIAGNOSIS — M50.10 CERVICAL DISC DISORDER WITH RADICULOPATHY: Primary | ICD-10-CM

## 2022-07-28 ENCOUNTER — INFUSION (OUTPATIENT)
Dept: ONCOLOGY | Facility: HOSPITAL | Age: 72
End: 2022-07-28

## 2022-07-28 VITALS
HEIGHT: 63 IN | BODY MASS INDEX: 37.49 KG/M2 | SYSTOLIC BLOOD PRESSURE: 130 MMHG | TEMPERATURE: 97.7 F | OXYGEN SATURATION: 96 % | HEART RATE: 73 BPM | WEIGHT: 211.6 LBS | DIASTOLIC BLOOD PRESSURE: 75 MMHG | RESPIRATION RATE: 18 BRPM

## 2022-07-28 DIAGNOSIS — J45.40 MODERATE PERSISTENT ASTHMA, UNSPECIFIED WHETHER COMPLICATED: Primary | ICD-10-CM

## 2022-07-28 PROCEDURE — 96372 THER/PROPH/DIAG INJ SC/IM: CPT

## 2022-07-28 PROCEDURE — 25010000002 OMALIZUMAB 150 MG/ML SOLUTION PREFILLED SYRINGE: Performed by: ALLERGY & IMMUNOLOGY

## 2022-07-28 RX ADMIN — OMALIZUMAB 150 MG: 150 INJECTION, SOLUTION SUBCUTANEOUS at 15:53

## 2022-07-28 NOTE — NURSING NOTE
Patient tolerated xolair injection without difficulty. Pt declined to stay for 30 minutes post injections stating she has not had any reaction with previous injections. Instructed to call her prescribing MD for any concerns prior to next appt. Discharged per ambulation.

## 2022-08-17 ENCOUNTER — OFFICE VISIT (OUTPATIENT)
Dept: FAMILY MEDICINE CLINIC | Facility: CLINIC | Age: 72
End: 2022-08-17

## 2022-08-17 VITALS
WEIGHT: 213.4 LBS | OXYGEN SATURATION: 96 % | DIASTOLIC BLOOD PRESSURE: 64 MMHG | TEMPERATURE: 97.8 F | HEIGHT: 63 IN | BODY MASS INDEX: 37.81 KG/M2 | HEART RATE: 94 BPM | SYSTOLIC BLOOD PRESSURE: 126 MMHG

## 2022-08-17 DIAGNOSIS — Z98.84 S/P GASTRIC BYPASS: ICD-10-CM

## 2022-08-17 DIAGNOSIS — K21.9 GASTROESOPHAGEAL REFLUX DISEASE WITHOUT ESOPHAGITIS: ICD-10-CM

## 2022-08-17 DIAGNOSIS — F33.41 RECURRENT MAJOR DEPRESSIVE DISORDER, IN PARTIAL REMISSION: Primary | ICD-10-CM

## 2022-08-17 DIAGNOSIS — R10.9 LEFT FLANK PAIN: ICD-10-CM

## 2022-08-17 PROCEDURE — 99214 OFFICE O/P EST MOD 30 MIN: CPT | Performed by: FAMILY MEDICINE

## 2022-08-17 RX ORDER — VENLAFAXINE HYDROCHLORIDE 75 MG/1
75 CAPSULE, EXTENDED RELEASE ORAL DAILY
Qty: 30 CAPSULE | Refills: 1 | Status: SHIPPED | OUTPATIENT
Start: 2022-08-17 | End: 2022-09-26 | Stop reason: SDUPTHER

## 2022-08-17 RX ORDER — THIAMINE HYDROCHLORIDE 100 MG/ML
200 INJECTION, SOLUTION INTRAMUSCULAR; INTRAVENOUS
Qty: 6 ML | Refills: 3 | Status: SHIPPED | OUTPATIENT
Start: 2022-08-17

## 2022-08-17 NOTE — PROGRESS NOTES
"Chief Complaint  Heartburn (Side pain), Med Refill (NEEDS REFILL ON THIAMINE/), and Depression    Subjective        Octavia Pendleton presents to [facility]  History of Present Illness    The patient presents today for medication refill.    Medication refill  The patient states she needs a new prescription for her thiamine.    Depression  The patient states her antidepressant is not working well for her. She states she has been too depressed. The patient states she has to make herself get out of bed in the morning and go to bed at night. She states she does not have any interest in doing anything. The patient states she has done better the last 2 days than she has done in the last 6 months. She denies any thoughts of harming herself or others. The patient states she has felt hopeless. She states she has been taking the same medication for several years. The patient states she has always been too active and she does not like how she feels. She states she is in pain all the time and she cannot get it under control. The patient states she cannot sit down and relax because she is in pain. She states everything comes up and it burns her esophagus. She states she does not think she is due for a colonoscopy for a couple of years. The patient states she has to go for her Xolair injections at [place].    Objective   Vital Signs:  /64   Pulse 94   Temp 97.8 °F (36.6 °C)   Ht 160 cm (63\")   Wt 96.8 kg (213 lb 6.4 oz)   SpO2 96%   BMI 37.80 kg/m²   Estimated body mass index is 37.8 kg/m² as calculated from the following:    Height as of this encounter: 160 cm (63\").    Weight as of this encounter: 96.8 kg (213 lb 6.4 oz).          Physical Exam  Constitutional:       General: She is not in acute distress.     Appearance: Normal appearance. She is well-developed.   HENT:      Head: Normocephalic and atraumatic.      Right Ear: Tympanic membrane, ear canal and external ear normal.     "  Left Ear: Tympanic membrane, ear canal and external ear normal.      Mouth/Throat:      Mouth: Mucous membranes are moist.      Pharynx: Oropharynx is clear.   Eyes:      Conjunctiva/sclera: Conjunctivae normal.      Pupils: Pupils are equal, round, and reactive to light.   Neck:      Thyroid: No thyromegaly.   Cardiovascular:      Rate and Rhythm: Normal rate and regular rhythm.      Heart sounds: No murmur heard.  Pulmonary:      Effort: Pulmonary effort is normal.      Breath sounds: Normal breath sounds. No wheezing.   Abdominal:      General: Bowel sounds are normal.      Palpations: Abdomen is soft.      Tenderness: There is no abdominal tenderness.   Musculoskeletal:         General: Normal range of motion.      Cervical back: Neck supple.   Lymphadenopathy:      Cervical: No cervical adenopathy.   Skin:     General: Skin is warm and dry.   Neurological:      Mental Status: She is alert and oriented to person, place, and time.   Psychiatric:         Mood and Affect: Mood normal.         Behavior: Behavior normal.        Result Review :                Assessment and Plan   Diagnoses and all orders for this visit:    1. Depression       - We will switch the patient from Lexapro to venlafaxine.       - She will follow up in 1 month.    2. Acid reflux       - We will refer the patient to a gastroenterologist.  3.  History of gastric bypass-await gastroenterology opinion       Follow Up   No follow-ups on file.  Patient was given instructions and counseling regarding her condition or for health maintenance advice. Please see specific information pulled into the AVS if appropriate.     Transcribed from ambient dictation for Meredith Lea Kehrer, MD by Zaheer Cooney.  08/17/22   14:24 EDT    Patient verbalized consent to the visit recording.  I have personally performed the services described in this document as transcribed by the above individual, and it is both accurate and complete.  Meredith Lea Kehrer, MD   8/17/2022  17:01 EDT

## 2022-08-17 NOTE — PROGRESS NOTES
"Chief Complaint  Heartburn (Side pain), Med Refill (NEEDS REFILL ON THIAMINE/), and Depression    Subjective    {Problem List  Visit Diagnosis   Encounters  Notes  Medications  Labs  Result Review Imaging  Media :23}    Octavia Pendleton presents to [facility]  History of Present Illness    Objective   Vital Signs:  /64   Pulse 94   Temp 97.8 °F (36.6 °C)   Ht 160 cm (63\")   Wt 96.8 kg (213 lb 6.4 oz)   SpO2 96%   BMI 37.80 kg/m²   Estimated body mass index is 37.8 kg/m² as calculated from the following:    Height as of this encounter: 160 cm (63\").    Weight as of this encounter: 96.8 kg (213 lb 6.4 oz).    {Class 2 Severe Obesity (BMI >=35 and <=39.9. (Optional):83439}      Physical Exam   Result Review :{Labs  Result Review  Imaging  Med Tab  Media  Procedures  :23}  {The following data was reviewed by (Optional):47318}  {Ambulatory Labs (Optional):22316}  {Data reviewed (Optional):47038:::1}          Assessment and Plan {CC Problem List  Visit Diagnosis   ROS  Review (Popup)  [facility] Maintenance  Quality  BestPractice  Medications  SmartSets  SnapShot Encounters  Media :23}  Diagnoses and all orders for this visit:    1. Recurrent major depressive disorder, in partial remission (HCC) (Primary)  -     venlafaxine XR (Effexor XR) 75 MG 24 hr capsule; Take 1 capsule by mouth Daily.  Dispense: 30 capsule; Refill: 1    2. S/P gastric bypass  -     thiamine (B-1) 100 MG/ML injection; Inject 2 mL into the appropriate muscle as directed by prescriber Every 30 (Thirty) Days.  Dispense: 6 mL; Refill: 3    3. Gastroesophageal reflux disease without esophagitis  -     Ambulatory Referral to Gastroenterology    4. Left flank pain           {Time Spent (Optional):55430}  Follow Up {Instructions Charge Capture  Follow-up Communications :23}  No follow-ups on file.  Patient was given instructions and counseling regarding her condition or for health " maintenance advice. Please see specific information pulled into the AVS if appropriate.       Answers for HPI/ROS submitted by the patient on 8/10/2022  What is the primary reason for your visit?: Back Pain

## 2022-08-25 ENCOUNTER — INFUSION (OUTPATIENT)
Dept: ONCOLOGY | Facility: HOSPITAL | Age: 72
End: 2022-08-25

## 2022-08-25 VITALS — SYSTOLIC BLOOD PRESSURE: 130 MMHG | TEMPERATURE: 97.9 F | DIASTOLIC BLOOD PRESSURE: 75 MMHG

## 2022-08-25 DIAGNOSIS — J45.40 MODERATE PERSISTENT ASTHMA, UNSPECIFIED WHETHER COMPLICATED: Primary | ICD-10-CM

## 2022-08-25 PROCEDURE — 96372 THER/PROPH/DIAG INJ SC/IM: CPT

## 2022-08-25 PROCEDURE — 25010000002 OMALIZUMAB 150 MG/ML SOLUTION PREFILLED SYRINGE: Performed by: ALLERGY & IMMUNOLOGY

## 2022-08-25 RX ADMIN — OMALIZUMAB 150 MG: 150 INJECTION, SOLUTION SUBCUTANEOUS at 13:39

## 2022-08-25 NOTE — NURSING NOTE
Declined 30 minute wait after the Xolair administered without incident into left arm. States she has been receiving 12 plus years without incident

## 2022-09-22 ENCOUNTER — INFUSION (OUTPATIENT)
Dept: ONCOLOGY | Facility: HOSPITAL | Age: 72
End: 2022-09-22

## 2022-09-22 VITALS — HEART RATE: 91 BPM | OXYGEN SATURATION: 95 % | TEMPERATURE: 97.3 F | RESPIRATION RATE: 16 BRPM

## 2022-09-22 DIAGNOSIS — J45.40 MODERATE PERSISTENT ASTHMA, UNSPECIFIED WHETHER COMPLICATED: Primary | ICD-10-CM

## 2022-09-22 PROCEDURE — 96372 THER/PROPH/DIAG INJ SC/IM: CPT

## 2022-09-22 PROCEDURE — 25010000002 OMALIZUMAB 150 MG/ML SOLUTION PREFILLED SYRINGE: Performed by: ALLERGY & IMMUNOLOGY

## 2022-09-22 RX ADMIN — OMALIZUMAB 150 MG: 150 INJECTION, SOLUTION SUBCUTANEOUS at 13:35

## 2022-09-22 NOTE — NURSING NOTE
Patient tolerated xolair injections without difficulty. Instructed to call her prescribing MD for any concerns prior to next appt. Pt declined to wait 30 minutes post observation as she has been receiving xolair for approximately 15 years by her reckoning with no issues. Discharged per ambulation.

## 2022-09-26 ENCOUNTER — TELEPHONE (OUTPATIENT)
Dept: FAMILY MEDICINE CLINIC | Facility: CLINIC | Age: 72
End: 2022-09-26

## 2022-09-26 ENCOUNTER — OFFICE VISIT (OUTPATIENT)
Dept: FAMILY MEDICINE CLINIC | Facility: CLINIC | Age: 72
End: 2022-09-26

## 2022-09-26 VITALS
DIASTOLIC BLOOD PRESSURE: 84 MMHG | SYSTOLIC BLOOD PRESSURE: 136 MMHG | BODY MASS INDEX: 38.02 KG/M2 | TEMPERATURE: 98.1 F | HEART RATE: 80 BPM | WEIGHT: 214.6 LBS | OXYGEN SATURATION: 96 % | HEIGHT: 63 IN

## 2022-09-26 DIAGNOSIS — F33.41 RECURRENT MAJOR DEPRESSIVE DISORDER, IN PARTIAL REMISSION: ICD-10-CM

## 2022-09-26 DIAGNOSIS — E78.5 HYPERLIPIDEMIA, UNSPECIFIED HYPERLIPIDEMIA TYPE: ICD-10-CM

## 2022-09-26 DIAGNOSIS — Z00.00 MEDICARE ANNUAL WELLNESS VISIT, SUBSEQUENT: Primary | ICD-10-CM

## 2022-09-26 DIAGNOSIS — Z86.73 H/O TRANSIENT CEREBRAL ISCHEMIA: ICD-10-CM

## 2022-09-26 DIAGNOSIS — E61.1 IRON DEFICIENCY: ICD-10-CM

## 2022-09-26 DIAGNOSIS — K21.9 GASTROESOPHAGEAL REFLUX DISEASE WITHOUT ESOPHAGITIS: ICD-10-CM

## 2022-09-26 DIAGNOSIS — E03.9 HYPOTHYROIDISM, UNSPECIFIED TYPE: ICD-10-CM

## 2022-09-26 DIAGNOSIS — I10 ESSENTIAL HYPERTENSION: ICD-10-CM

## 2022-09-26 DIAGNOSIS — E51.9 THIAMINE DEFICIENCY: ICD-10-CM

## 2022-09-26 DIAGNOSIS — Z98.84 S/P GASTRIC BYPASS: ICD-10-CM

## 2022-09-26 PROCEDURE — 96160 PT-FOCUSED HLTH RISK ASSMT: CPT | Performed by: FAMILY MEDICINE

## 2022-09-26 PROCEDURE — 1170F FXNL STATUS ASSESSED: CPT | Performed by: FAMILY MEDICINE

## 2022-09-26 PROCEDURE — 1159F MED LIST DOCD IN RCRD: CPT | Performed by: FAMILY MEDICINE

## 2022-09-26 PROCEDURE — 96372 THER/PROPH/DIAG INJ SC/IM: CPT | Performed by: FAMILY MEDICINE

## 2022-09-26 PROCEDURE — G0439 PPPS, SUBSEQ VISIT: HCPCS | Performed by: FAMILY MEDICINE

## 2022-09-26 RX ORDER — THIAMINE HYDROCHLORIDE 100 MG/ML
200 INJECTION, SOLUTION INTRAMUSCULAR; INTRAVENOUS DAILY
Status: SHIPPED | OUTPATIENT
Start: 2022-09-26

## 2022-09-26 RX ORDER — CYANOCOBALAMIN 1000 UG/ML
1000 INJECTION, SOLUTION INTRAMUSCULAR; SUBCUTANEOUS
Qty: 3 ML | Refills: 3 | Status: SHIPPED | OUTPATIENT
Start: 2022-09-26

## 2022-09-26 RX ORDER — ASPIRIN 81 MG/1
81 TABLET, CHEWABLE ORAL DAILY
COMMUNITY

## 2022-09-26 RX ORDER — THIAMINE HYDROCHLORIDE 100 MG/ML
200 INJECTION, SOLUTION INTRAMUSCULAR; INTRAVENOUS DAILY
Qty: 2 ML | Refills: 0 | Status: SHIPPED | OUTPATIENT
Start: 2022-09-26 | End: 2022-10-27 | Stop reason: ALTCHOICE

## 2022-09-26 RX ORDER — LEVOTHYROXINE SODIUM 0.05 MG/1
50 TABLET ORAL DAILY
Qty: 90 TABLET | Refills: 3 | Status: SHIPPED | OUTPATIENT
Start: 2022-09-26

## 2022-09-26 RX ORDER — VENLAFAXINE HYDROCHLORIDE 75 MG/1
75 CAPSULE, EXTENDED RELEASE ORAL DAILY
Qty: 90 CAPSULE | Refills: 1 | Status: SHIPPED | OUTPATIENT
Start: 2022-09-26 | End: 2023-01-04 | Stop reason: SINTOL

## 2022-09-26 RX ORDER — POTASSIUM CHLORIDE 20 MEQ/1
20 TABLET, EXTENDED RELEASE ORAL 2 TIMES DAILY
Qty: 180 TABLET | Refills: 3 | Status: SHIPPED | OUTPATIENT
Start: 2022-09-26

## 2022-09-26 RX ORDER — HYDROCHLOROTHIAZIDE 12.5 MG/1
12.5 TABLET ORAL DAILY
Qty: 90 TABLET | Refills: 3 | Status: SHIPPED | OUTPATIENT
Start: 2022-09-26

## 2022-09-26 RX ADMIN — THIAMINE HYDROCHLORIDE 200 MG: 100 INJECTION, SOLUTION INTRAMUSCULAR; INTRAVENOUS at 11:43

## 2022-09-26 NOTE — PROGRESS NOTES
The ABCs of the Annual Wellness Visit  Subsequent Medicare Wellness Visit    Chief Complaint   Patient presents with   • Medicare Wellness-subsequent   • Hyperlipidemia   • Hypothyroidism   • Depression   • Asthma      Subjective    History of Present Illness:  Octavia Pendleton is a 72 y.o. female who presents for a Subsequent Medicare Wellness Visit.  Patient presents for her Medicare wellness and follow-up on multiple chronic medical problems.  She is compliant with her medication and doing well.  She is fasting for labs this morning.  Her mood is much better after her last visit when we switched her to the venlafaxine.  The following portions of the patient's history were reviewed and   updated as appropriate: allergies, current medications, past family history, past medical history, past social history, past surgical history and problem list.    Compared to one year ago, the patient feels her physical   health is worse.    Compared to one year ago, the patient feels her mental   health is the same.    Recent Hospitalizations:  She was not admitted to the hospital during the last year.       Current Medical Providers:  Patient Care Team:  Kehrer, Meredith Lea, MD as PCP - General  Kehrer, Meredith Lea, MD as PCP - Family Medicine    Outpatient Medications Prior to Visit   Medication Sig Dispense Refill   • albuterol (PROVENTIL HFA;VENTOLIN HFA) 108 (90 BASE) MCG/ACT inhaler Inhale 2 puffs every 4 (four) hours as needed for wheezing.     • aspirin 81 MG chewable tablet Chew 81 mg Daily.     • azelastine (ASTELIN) 0.1 % nasal spray 1 spray into the nostril(s) as directed by provider 2 (Two) Times a Day. Use in each nostril as directed 30 mL 2   • azelastine (OPTIVAR) 0.05 % ophthalmic solution Administer 1 drop to both eyes 2 (Two) Times a Day. 6 each 3   • EPINEPHrine (EPIPEN) 0.3 MG/0.3ML solution auto-injector injection      • esomeprazole (nexIUM) 40 MG capsule Take 1 capsule by mouth 2 (Two) Times a Day. 180  capsule 3   • ferrous sulfate 325 (65 FE) MG tablet Take 1 tablet by mouth Daily With Breakfast. 90 tablet 3   • fexofenadine (ALLEGRA) 180 MG tablet Take 180 mg by mouth daily.     • fluticasone (FLONASE) 50 MCG/ACT nasal spray 2 sprays into the nostril(s) as directed by provider Daily. Administer 2 sprays in each nostril for each dose.     • Fluticasone Furoate-Vilanterol (BREO ELLIPTA IN) Inhale 2 puffs Daily.     • levalbuterol (XOPENEX) 0.63 MG/3ML nebulizer solution Take 1 ampule by nebulization every 4 (four) hours as needed for wheezing.     • metaxalone (Skelaxin) 800 MG tablet Take 0.5-1 tablets by mouth 3 (Three) Times a Day As Needed for Muscle Spasms. 60 tablet 0   • omalizumab (XOLAIR) 150 MG injection Inject 150 mg under the skin every 30 (thirty) days.     • Probiotic Product (FLORAJEN3) capsule Take 1 tablet by mouth daily.     • thiamine (B-1) 100 MG/ML injection Inject 2 mL into the appropriate muscle as directed by prescriber Every 30 (Thirty) Days. 6 mL 3   • cyanocobalamin 1000 MCG/ML injection INJECT 1 ML INTO THE APPROPRIATE MUSCLE AS DIRECTED BY PRESCRIBER EVERY 28 DAYS 3 mL 3   • hydroCHLOROthiazide (HYDRODIURIL) 12.5 MG tablet TAKE ONE TABLET BY MOUTH DAILY 90 tablet 3   • levothyroxine (SYNTHROID, LEVOTHROID) 50 MCG tablet TAKE ONE TABLET BY MOUTH DAILY 90 tablet 3   • potassium chloride (KLOR-CON) 20 MEQ CR tablet Take 1 tablet by mouth 2 (Two) Times a Day. 180 tablet 3   • venlafaxine XR (Effexor XR) 75 MG 24 hr capsule Take 1 capsule by mouth Daily. 30 capsule 1     Facility-Administered Medications Prior to Visit   Medication Dose Route Frequency Provider Last Rate Last Admin   • cyanocobalamin injection 1,000 mcg  1,000 mcg Intramuscular Q28 Days Kehrer, Meredith Lea, MD   1,000 mcg at 03/16/21 1515       No opioid medication identified on active medication list. I have reviewed chart for other potential  high risk medication/s and harmful drug interactions in the  "elderly.          Aspirin is on active medication list. Aspirin use is indicated based on review of current medical condition/s. Pros and cons of this therapy have been discussed today. Benefits of this medication outweigh potential harm.  Patient has been encouraged to continue taking this medication.  .      Patient Active Problem List   Diagnosis   • Asthma, moderate persistent   • JAY on CPAP   • Chronic fatigue   • S/P abdominoplasty   • Panniculus   • Hyperlipidemia   • Hypothyroidism   • Vitamin B12 deficiency   • Vitamin D deficiency   • Thiamine deficiency   • S/P gastric bypass   • GERD (gastroesophageal reflux disease)   • Depression   • Class 2 obesity   • Snoring   • Morning headache   • Left flank pain     Advance Care Planning  Advance Directive is not on file.  ACP discussion was held with the patient during this visit. Patient has an advance directive (not in EMR), copy requested.          Objective    Vitals:    09/26/22 1010   BP: 136/84   Pulse: 80   Temp: 98.1 °F (36.7 °C)   SpO2: 96%   Weight: 97.3 kg (214 lb 9.6 oz)   Height: 160 cm (63\")     Estimated body mass index is 38.01 kg/m² as calculated from the following:    Height as of this encounter: 160 cm (63\").    Weight as of this encounter: 97.3 kg (214 lb 9.6 oz).    Class 2 Severe Obesity (BMI >=35 and <=39.9). Obesity-related health conditions include the following: hypertension. Obesity is unchanged. BMI is is above average; BMI management plan is completed. We discussed portion control and increasing exercise.      Does the patient have evidence of cognitive impairment? No    Physical Exam  Vitals and nursing note reviewed.   Constitutional:       General: She is not in acute distress.     Appearance: Normal appearance. She is well-developed. She is obese.   HENT:      Head: Normocephalic and atraumatic.      Right Ear: Tympanic membrane, ear canal and external ear normal.      Left Ear: Tympanic membrane, ear canal and external ear " normal.      Nose: Nose normal.      Mouth/Throat:      Mouth: Mucous membranes are moist.      Pharynx: Oropharynx is clear. No oropharyngeal exudate or posterior oropharyngeal erythema.   Eyes:      Conjunctiva/sclera: Conjunctivae normal.      Pupils: Pupils are equal, round, and reactive to light.   Neck:      Thyroid: No thyromegaly.   Cardiovascular:      Rate and Rhythm: Normal rate and regular rhythm.      Heart sounds: No murmur heard.  Pulmonary:      Effort: Pulmonary effort is normal.      Breath sounds: Normal breath sounds. No wheezing.   Abdominal:      General: Abdomen is flat. Bowel sounds are normal. There is no distension.      Palpations: Abdomen is soft. There is no mass.      Tenderness: There is no abdominal tenderness.      Hernia: No hernia is present.   Musculoskeletal:         General: No swelling. Normal range of motion.      Cervical back: Normal range of motion and neck supple.      Right lower leg: No edema.      Left lower leg: No edema.   Lymphadenopathy:      Cervical: No cervical adenopathy.   Skin:     General: Skin is warm and dry.      Capillary Refill: Capillary refill takes less than 2 seconds.      Findings: No rash.   Neurological:      General: No focal deficit present.      Mental Status: She is alert and oriented to person, place, and time.      Cranial Nerves: No cranial nerve deficit.   Psychiatric:         Mood and Affect: Mood normal.         Behavior: Behavior normal.                 HEALTH RISK ASSESSMENT    Smoking Status:  Social History     Tobacco Use   Smoking Status Never Smoker   Smokeless Tobacco Never Used     Alcohol Consumption:  Social History     Substance and Sexual Activity   Alcohol Use Yes   • Alcohol/week: 1.0 standard drink   • Types: 1 Glasses of wine per week    Comment: rare use     Fall Risk Screen:    [facility] Fall Risk Assessment was completed, and patient is at HIGH risk for falls. Assessment completed on:9/26/2022    Depression  Screening:  PHQ-2/PHQ-9 Depression Screening 9/26/2022   Retired PHQ-9 Total Score -   Retired Total Score -   Little Interest or Pleasure in Doing Things 0-->not at all   Feeling Down, Depressed or Hopeless 1-->several days   PHQ-9: Brief Depression Severity Measure Score 1       Health Habits and Functional and Cognitive Screening:  Functional & Cognitive Status 9/26/2022   Do you have difficulty preparing food and eating? No   Do you have difficulty bathing yourself, getting dressed or grooming yourself? No   Do you have difficulty using the toilet? No   Do you have difficulty moving around from place to place? No   Do you have trouble with steps or getting out of a bed or a chair? Yes   Current Diet Well Balanced Diet   Dental Exam Up to date   Eye Exam Up to date   Exercise (times per week) 2 times per week   Current Exercises Include Walking;Gardening;Yard Work   Current Exercise Activities Include -   Do you need help using the phone?  No   Are you deaf or do you have serious difficulty hearing?  Yes   Do you need help with transportation? No   Do you need help shopping? No   Do you need help preparing meals?  No   Do you need help with housework?  No   Do you need help with laundry? No   Do you need help taking your medications? No   Do you need help managing money? No   Do you ever drive or ride in a car without wearing a seat belt? No   Have you felt unusual stress, anger or loneliness in the last month? -   Who do you live with? -   If you need help, do you have trouble finding someone available to you? -   Have you been bothered in the last four weeks by sexual problems? -   Do you have difficulty concentrating, remembering or making decisions? -       Age-appropriate Screening Schedule:  Refer to the list below for future screening recommendations based on patient's age, sex and/or medical conditions. Orders for these recommended tests are listed in the plan section. The patient has been provided with a  written plan.    Health Maintenance   Topic Date Due   • INFLUENZA VACCINE  10/01/2022   • DXA SCAN  10/28/2022   • LIPID PANEL  03/30/2023   • MAMMOGRAM  12/20/2023   • TDAP/TD VACCINES (2 - Td or Tdap) 09/17/2031   • ZOSTER VACCINE  Completed   • PAP SMEAR  Discontinued              Assessment & Plan   CMS Preventative Services Quick Reference  Risk Factors Identified During Encounter  Depression/Dysphoria  Obesity/Overweight   The above risks/problems have been discussed with the patient.  Follow up actions/plans if indicated are seen below in the Assessment/Plan Section.  Pertinent information has been shared with the patient in the After Visit Summary.    Diagnoses and all orders for this visit:    1. Medicare annual wellness visit, subsequent (Primary)    2. Essential hypertension  -     CBC & Differential  -     Comprehensive Metabolic Panel  -     Lipid Panel  -     TSH  -     hydroCHLOROthiazide (HYDRODIURIL) 12.5 MG tablet; Take 1 tablet by mouth Daily.  Dispense: 90 tablet; Refill: 3    3. Hypothyroidism, unspecified type  -     TSH  -     levothyroxine (SYNTHROID, LEVOTHROID) 50 MCG tablet; Take 1 tablet by mouth Daily.  Dispense: 90 tablet; Refill: 3    4. Hyperlipidemia, unspecified hyperlipidemia type  -     Lipid Panel    5. Recurrent major depressive disorder, in partial remission (HCC)  -     venlafaxine XR (Effexor XR) 75 MG 24 hr capsule; Take 1 capsule by mouth Daily.  Dispense: 90 capsule; Refill: 1    6. Gastroesophageal reflux disease without esophagitis    7. S/P gastric bypass  -     Vitamin B12  -     Iron  -     Ferritin  -     potassium chloride (KLOR-CON) 20 MEQ CR tablet; Take 1 tablet by mouth 2 (Two) Times a Day.  Dispense: 180 tablet; Refill: 3  -     cyanocobalamin 1000 MCG/ML injection; Inject 1 mL under the skin into the appropriate area as directed Every 30 (Thirty) Days.  Dispense: 3 mL; Refill: 3    8. H/O transient cerebral ischemia    9. Iron deficiency  -     CBC &  Differential  -     Iron  -     Ferritin    10. Thiamine deficiency  -     thiamine (B-1) injection 200 mg    Other orders  -     thiamine (B-1) 100 MG/ML injection; Inject 2 mL into the appropriate muscle as directed by prescriber Daily.  Dispense: 2 mL; Refill: 0        Follow Up:   Return in about 3 months (around 12/26/2022) for Recheck mood.     An After Visit Summary and PPPS were made available to the patient.

## 2022-09-26 NOTE — TELEPHONE ENCOUNTER
Pharmacy Name:  [facility]     Pharmacy representative name:[email or web address]      Pharmacy representative phone number: [phone]    What medication are you calling in regards to:thiamine (B-1) injection 200 mg    What question does the pharmacy have: DIRECTIONS    Who is the provider that prescribed the medication: MEREDITH KEHRER     Additional notes:PRESCRIPTION RECEIVED  SAYS  2ML EVERY 30 DAYS  OTHER PRESCRIPTION RECEIVED IN PAST SAYS  2 ML DAILY. PHARMACY WOULD LIKE TO KNOW WHICH REQUEST IS CURRENT, IF IT IS DAILY , NEEDING NEW PRESCRIPTION SENT

## 2022-09-27 ENCOUNTER — OFFICE VISIT (OUTPATIENT)
Dept: GASTROENTEROLOGY | Facility: CLINIC | Age: 72
End: 2022-09-27

## 2022-09-27 ENCOUNTER — PREP FOR SURGERY (OUTPATIENT)
Dept: SURGERY | Facility: SURGERY CENTER | Age: 72
End: 2022-09-27

## 2022-09-27 ENCOUNTER — TELEPHONE (OUTPATIENT)
Dept: FAMILY MEDICINE CLINIC | Facility: CLINIC | Age: 72
End: 2022-09-27

## 2022-09-27 VITALS
HEART RATE: 88 BPM | WEIGHT: 214.8 LBS | SYSTOLIC BLOOD PRESSURE: 160 MMHG | HEIGHT: 63 IN | DIASTOLIC BLOOD PRESSURE: 88 MMHG | BODY MASS INDEX: 38.06 KG/M2 | TEMPERATURE: 97.3 F | OXYGEN SATURATION: 94 %

## 2022-09-27 DIAGNOSIS — K21.9 GASTROESOPHAGEAL REFLUX DISEASE, UNSPECIFIED WHETHER ESOPHAGITIS PRESENT: Primary | ICD-10-CM

## 2022-09-27 DIAGNOSIS — Z98.84 S/P GASTRIC BYPASS: ICD-10-CM

## 2022-09-27 DIAGNOSIS — Z01.810 PREOP CARDIOVASCULAR EXAM: ICD-10-CM

## 2022-09-27 DIAGNOSIS — Z86.73 H/O TRANSIENT CEREBRAL ISCHEMIA: ICD-10-CM

## 2022-09-27 DIAGNOSIS — Z12.11 ENCOUNTER FOR SCREENING COLONOSCOPY: ICD-10-CM

## 2022-09-27 DIAGNOSIS — I10 ESSENTIAL HYPERTENSION: Primary | ICD-10-CM

## 2022-09-27 LAB
ALBUMIN SERPL-MCNC: 4.2 G/DL (ref 3.5–5.2)
ALBUMIN/GLOB SERPL: 1.8 G/DL
ALP SERPL-CCNC: 89 U/L (ref 39–117)
ALT SERPL-CCNC: 15 U/L (ref 1–33)
AST SERPL-CCNC: 18 U/L (ref 1–32)
BASOPHILS # BLD AUTO: 0.07 10*3/MM3 (ref 0–0.2)
BASOPHILS NFR BLD AUTO: 1 % (ref 0–1.5)
BILIRUB SERPL-MCNC: 0.5 MG/DL (ref 0–1.2)
BUN SERPL-MCNC: 15 MG/DL (ref 8–23)
BUN/CREAT SERPL: 17.9 (ref 7–25)
CALCIUM SERPL-MCNC: 9.1 MG/DL (ref 8.6–10.5)
CHLORIDE SERPL-SCNC: 100 MMOL/L (ref 98–107)
CHOLEST SERPL-MCNC: 236 MG/DL (ref 0–200)
CO2 SERPL-SCNC: 30.5 MMOL/L (ref 22–29)
CREAT SERPL-MCNC: 0.84 MG/DL (ref 0.57–1)
EGFRCR SERPLBLD CKD-EPI 2021: 74.9 ML/MIN/1.73
EOSINOPHIL # BLD AUTO: 0.25 10*3/MM3 (ref 0–0.4)
EOSINOPHIL NFR BLD AUTO: 3.4 % (ref 0.3–6.2)
ERYTHROCYTE [DISTWIDTH] IN BLOOD BY AUTOMATED COUNT: 12.8 % (ref 12.3–15.4)
FERRITIN SERPL-MCNC: 109 NG/ML (ref 13–150)
GLOBULIN SER CALC-MCNC: 2.3 GM/DL
GLUCOSE SERPL-MCNC: 90 MG/DL (ref 65–99)
HCT VFR BLD AUTO: 42.9 % (ref 34–46.6)
HDLC SERPL-MCNC: 89 MG/DL (ref 40–60)
HGB BLD-MCNC: 14.3 G/DL (ref 12–15.9)
IMM GRANULOCYTES # BLD AUTO: 0.01 10*3/MM3 (ref 0–0.05)
IMM GRANULOCYTES NFR BLD AUTO: 0.1 % (ref 0–0.5)
IRON SERPL-MCNC: 117 MCG/DL (ref 37–145)
LDLC SERPL CALC-MCNC: 126 MG/DL (ref 0–100)
LYMPHOCYTES # BLD AUTO: 2.72 10*3/MM3 (ref 0.7–3.1)
LYMPHOCYTES NFR BLD AUTO: 37.5 % (ref 19.6–45.3)
MCH RBC QN AUTO: 29.4 PG (ref 26.6–33)
MCHC RBC AUTO-ENTMCNC: 33.3 G/DL (ref 31.5–35.7)
MCV RBC AUTO: 88.1 FL (ref 79–97)
MONOCYTES # BLD AUTO: 0.97 10*3/MM3 (ref 0.1–0.9)
MONOCYTES NFR BLD AUTO: 13.4 % (ref 5–12)
NEUTROPHILS # BLD AUTO: 3.23 10*3/MM3 (ref 1.7–7)
NEUTROPHILS NFR BLD AUTO: 44.6 % (ref 42.7–76)
NRBC BLD AUTO-RTO: 0 /100 WBC (ref 0–0.2)
PLATELET # BLD AUTO: 266 10*3/MM3 (ref 140–450)
POTASSIUM SERPL-SCNC: 4.3 MMOL/L (ref 3.5–5.2)
PROT SERPL-MCNC: 6.5 G/DL (ref 6–8.5)
RBC # BLD AUTO: 4.87 10*6/MM3 (ref 3.77–5.28)
SODIUM SERPL-SCNC: 142 MMOL/L (ref 136–145)
TRIGL SERPL-MCNC: 121 MG/DL (ref 0–150)
TSH SERPL DL<=0.005 MIU/L-ACNC: 0.8 UIU/ML (ref 0.27–4.2)
VIT B12 SERPL-MCNC: 407 PG/ML (ref 211–946)
VLDLC SERPL CALC-MCNC: 21 MG/DL (ref 5–40)
WBC # BLD AUTO: 7.25 10*3/MM3 (ref 3.4–10.8)

## 2022-09-27 PROCEDURE — 99204 OFFICE O/P NEW MOD 45 MIN: CPT | Performed by: INTERNAL MEDICINE

## 2022-09-27 RX ORDER — SODIUM CHLORIDE, SODIUM LACTATE, POTASSIUM CHLORIDE, CALCIUM CHLORIDE 600; 310; 30; 20 MG/100ML; MG/100ML; MG/100ML; MG/100ML
30 INJECTION, SOLUTION INTRAVENOUS CONTINUOUS PRN
Status: CANCELLED | OUTPATIENT
Start: 2022-09-27

## 2022-09-27 RX ORDER — SODIUM CHLORIDE 0.9 % (FLUSH) 0.9 %
10 SYRINGE (ML) INJECTION AS NEEDED
Status: CANCELLED | OUTPATIENT
Start: 2022-09-27

## 2022-09-27 RX ORDER — SODIUM CHLORIDE 0.9 % (FLUSH) 0.9 %
3 SYRINGE (ML) INJECTION EVERY 12 HOURS SCHEDULED
Status: CANCELLED | OUTPATIENT
Start: 2022-09-27

## 2022-09-27 NOTE — PROGRESS NOTES
"Chief Complaint   Patient presents with   • Abdominal Pain           History of Present Illness  Patient here for consultation regarding chronic and worsening GERD.  She currently takes Nexium 40 mg daily.  She had a colonoscopy in 2008 showing hemorrhoids.    She reports progressively worsening esophageal dysphagia without requiring dysphagia.      Additionally she reports intermittent substernal tightness that was evaluated by her pcp and recommended that she follow up with GI to rule out GI cause to pain.    Denies recent cardiac workup however states that she has had prior cardiac evaluation in 2019- report not available at time of visit.      She has a history of Gastric bypass and last colonoscopy was performed in 2008.    Denies nausea or vomiting.     Describes bowel habits as regular without melena or hematochezia.       Denies family history of colon cancer or colon polyps      Result Review :       SCANNED - COLONOSCOPY (06/06/2008)  Comprehensive Metabolic Panel (09/26/2022 11:09)  CBC & Differential (09/26/2022 11:09)  CT Abdomen Pelvis With Contrast (02/15/2022 12:27)      Vital Signs:   /88   Pulse 88   Temp 97.3 °F (36.3 °C)   Ht 160 cm (63\")   Wt 97.4 kg (214 lb 12.8 oz)   SpO2 94%   BMI 38.05 kg/m²     Body mass index is 38.05 kg/m².     Physical Exam  Vitals reviewed.   Constitutional:       Appearance: Normal appearance.   Abdominal:      General: Bowel sounds are normal. There is no distension.      Palpations: Abdomen is soft. Abdomen is not rigid. There is no mass or pulsatile mass.      Tenderness: There is no abdominal tenderness. There is no guarding or rebound.           Assessment and Plan    Diagnoses and all orders for this visit:    1. Gastroesophageal reflux disease, unspecified whether esophagitis present (Primary)    2. S/P gastric bypass    3. Encounter for screening colonoscopy         BRIEF SUMMARY  Patient today for consultation.  He complains of chronic and " worsening episodes of chest discomfort and episodes of reflux.  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  She also complains of shortness of breath with exertion which she attributes to COPD.  In addition, she is due for screening colonoscopy.  We will plan for an EGD and colonoscopy after cardiac clearance.    I have reviewed and confirmed the accuracy of the HPI and Assessment and Plan as documented by the APRN JOAO Castro        Follow Up   No follow-ups on file.    Patient Instructions   Schedule EGD and colonoscopy, orders placed     For GERD:    Follow antireflux precautions:    1. Avoiding eating within 3 to 4 hours of bedtime.    2. Avoid foods that can trigger symptoms which may include:  a. citrus fruits  b. spicy foods,  c. Tomatoes  d. Red sauces  e.  Chocolate  f. coffee/tea  g. caffeinated or carbonated beverages  h. alcohol  You will need cardiac clearance prior to endoscopies

## 2022-09-27 NOTE — PATIENT INSTRUCTIONS
Schedule EGD and colonoscopy, orders placed     For GERD:    Follow antireflux precautions:    Avoiding eating within 3 to 4 hours of bedtime.    Avoid foods that can trigger symptoms which may include:  citrus fruits  spicy foods,  Tomatoes  Red sauces   Chocolate  coffee/tea  caffeinated or carbonated beverages  alcohol  You will need cardiac clearance prior to endoscopies

## 2022-09-27 NOTE — TELEPHONE ENCOUNTER
Caller: Octavia Pendleton    Relationship: Self    Best call back number: [phone]    What is the best time to reach you: ANY TIME    Who are you requesting to speak with (clinical staff, provider,  specific staff member): DR. KEHRER    What was the call regarding: PATIENT HAD HER FIRST APPOINTMENT WITH [facility] TODAY AND THEY HAVE SCHEDULED HER SCOPE PROCEDURE FOR 11/21/22 AND THE DOCTOR ADVISED HER TO CONTACT DR. KEHRER TO ORDER A CARDIOLOGY WORK UP PRIOR TO HER PROCEDURE. ANAESTHESIOLOGIST NEEDS THIS BEFORE AGREEING TO THE PROCEDURE.    PLEASE ADVISE    Do you require a callback: YES

## 2022-10-13 ENCOUNTER — OFFICE VISIT (OUTPATIENT)
Dept: NEUROSURGERY | Facility: CLINIC | Age: 72
End: 2022-10-13

## 2022-10-13 VITALS
OXYGEN SATURATION: 98 % | TEMPERATURE: 96.9 F | HEART RATE: 87 BPM | HEIGHT: 63 IN | WEIGHT: 214.8 LBS | BODY MASS INDEX: 38.06 KG/M2 | DIASTOLIC BLOOD PRESSURE: 80 MMHG | SYSTOLIC BLOOD PRESSURE: 129 MMHG

## 2022-10-13 DIAGNOSIS — M54.16 LUMBAR RADICULOPATHY: ICD-10-CM

## 2022-10-13 DIAGNOSIS — R10.9 LEFT FLANK PAIN: Primary | ICD-10-CM

## 2022-10-13 PROCEDURE — 99204 OFFICE O/P NEW MOD 45 MIN: CPT | Performed by: NEUROLOGICAL SURGERY

## 2022-10-20 ENCOUNTER — INFUSION (OUTPATIENT)
Dept: ONCOLOGY | Facility: HOSPITAL | Age: 72
End: 2022-10-20

## 2022-10-20 VITALS
DIASTOLIC BLOOD PRESSURE: 72 MMHG | HEIGHT: 63 IN | WEIGHT: 215.6 LBS | HEART RATE: 86 BPM | OXYGEN SATURATION: 98 % | RESPIRATION RATE: 18 BRPM | SYSTOLIC BLOOD PRESSURE: 136 MMHG | TEMPERATURE: 97.3 F | BODY MASS INDEX: 38.2 KG/M2

## 2022-10-20 DIAGNOSIS — J45.40 MODERATE PERSISTENT ASTHMA, UNSPECIFIED WHETHER COMPLICATED: Primary | ICD-10-CM

## 2022-10-20 PROCEDURE — 96372 THER/PROPH/DIAG INJ SC/IM: CPT

## 2022-10-20 PROCEDURE — 25010000002 OMALIZUMAB 150 MG/ML SOLUTION PREFILLED SYRINGE: Performed by: ALLERGY & IMMUNOLOGY

## 2022-10-20 RX ADMIN — OMALIZUMAB 150 MG: 150 INJECTION, SOLUTION SUBCUTANEOUS at 15:10

## 2022-10-20 NOTE — NURSING NOTE
Arrived for fasenra injection. Indication and side effects reviewed. Medications & allergies verified. Fasenra administered SC in right  arm without incidence. Denies any respiratory problems, or increase in SOA. Pt tolerated injection well.  Instructed not to stop using their systemic or inhaled corticosteroids unless directed by their physician, and instructed  to call prescribing MD for any concerns or questions.  Pt vu and discharged in stable condition; declined the 30 minute wait.

## 2022-10-27 ENCOUNTER — OFFICE VISIT (OUTPATIENT)
Dept: CARDIOLOGY | Facility: CLINIC | Age: 72
End: 2022-10-27

## 2022-10-27 VITALS
HEIGHT: 63 IN | WEIGHT: 216.4 LBS | DIASTOLIC BLOOD PRESSURE: 80 MMHG | HEART RATE: 82 BPM | BODY MASS INDEX: 38.34 KG/M2 | SYSTOLIC BLOOD PRESSURE: 132 MMHG

## 2022-10-27 DIAGNOSIS — Z01.810 PREOP CARDIOVASCULAR EXAM: Primary | ICD-10-CM

## 2022-10-27 DIAGNOSIS — Q24.5: ICD-10-CM

## 2022-10-27 PROCEDURE — 93000 ELECTROCARDIOGRAM COMPLETE: CPT | Performed by: INTERNAL MEDICINE

## 2022-10-27 PROCEDURE — 99204 OFFICE O/P NEW MOD 45 MIN: CPT | Performed by: INTERNAL MEDICINE

## 2022-10-27 NOTE — PROGRESS NOTES
[facility] New Patient Office Note     Encounter Date:10/27/22  Patient:Octavia Pendleton  :1950  MRN:[id number]    Referring Provider: Meredith Lea Kehrer, MD    Consulted for: Cardiac clearance    Chief Complaint:   Chief Complaint   Patient presents with   • Surgical Clearance     History of Presenting Illness:      Ms. Pendleton is 72 y.o. 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  In general patient flexer doing fairly well.  She is planning on having a upper and lower endoscopy for further evaluation of gastric symptoms.  She does mention 2 episodes of chest discomfort which is a burning sensation mainly in the middle of the night.  Lasted for about an hour and resolved on their own.  This prompted further evaluation with her GI colleagues planning on doing a colonoscopy.  Given her cardiac history she was asked to get cardiac clearance from us.  Fortunately patient is doing very well.  She is not had any recurrent cardiac symptoms such as exertional angina.  She was diagnosed with an anomalous left main coronary back in  with further follow-up with a coronary CTA demonstrating a benign anatomical course.  She really has not had any cardiac follow-up since then      Review of Systems:  Review of Systems   Constitutional: Negative.   HENT: Negative.    Eyes: Negative.    Cardiovascular: Positive for dyspnea on exertion.   Respiratory: Positive for shortness of breath.    Endocrine: Negative.    Hematologic/Lymphatic: Negative.    Skin: Negative.    Musculoskeletal: Negative.    Gastrointestinal: Positive for heartburn.   Genitourinary: Negative.    Neurological: Negative.    Psychiatric/Behavioral: Negative.    Allergic/Immunologic:  Negative.        Current Outpatient Medications on File Prior to Visit   Medication Sig Dispense Refill   • albuterol (PROVENTIL HFA;VENTOLIN HFA) 108 (90 BASE) MCG/ACT inhaler Inhale 2 puffs every 4 (four) hours as needed for wheezing.     • aspirin 81 MG chewable tablet Chew 81 mg Daily.     • azelastine (ASTELIN) 0.1 % nasal spray 1 spray into the nostril(s) as directed by provider 2 (Two) Times a Day. Use in each nostril as directed 30 mL 2   • azelastine (OPTIVAR) 0.05 % ophthalmic solution Administer 1 drop to both eyes 2 (Two) Times a Day. 6 each 3   • cyanocobalamin 1000 MCG/ML injection Inject 1 mL under the skin into the appropriate area as directed Every 30 (Thirty) Days. 3 mL 3   • EPINEPHrine (EPIPEN) 0.3 MG/0.3ML solution auto-injector injection      • esomeprazole (nexIUM) 40 MG capsule Take 1 capsule by mouth 2 (Two) Times a Day. 180 capsule 3   • ferrous sulfate 325 (65 FE) MG tablet Take 1 tablet by mouth Daily With Breakfast. 90 tablet 3   • fexofenadine (ALLEGRA) 180 MG tablet Take 180 mg by mouth daily.     • fluticasone (FLONASE) 50 MCG/ACT nasal spray 2 sprays into the nostril(s) as directed by provider Daily. Administer 2 sprays in each nostril for each dose.     • Fluticasone Furoate-Vilanterol (BREO ELLIPTA IN) Inhale 2 puffs Daily.     • hydroCHLOROthiazide (HYDRODIURIL) 12.5 MG tablet Take 1 tablet by mouth Daily. 90 tablet 3   • levalbuterol (XOPENEX) 0.63 MG/3ML nebulizer solution Take 1 ampule by nebulization every 4 (four) hours as needed for wheezing.     • levothyroxine (SYNTHROID, LEVOTHROID) 50 MCG tablet Take 1 tablet by mouth Daily. 90 tablet 3   • metaxalone (Skelaxin) 800 MG tablet Take 0.5-1 tablets by mouth 3 (Three) Times a Day As Needed for Muscle Spasms. 60 tablet 0   • omalizumab (XOLAIR) 150 MG injection Inject 150 mg under the skin every 30 (thirty) days.     • potassium chloride (KLOR-CON) 20 MEQ CR tablet Take 1 tablet by mouth 2 (Two) Times a Day. 180 tablet 3   •  Probiotic Product (FLORAJEN3) capsule Take 1 tablet by mouth daily.     • thiamine (B-1) 100 MG/ML injection Inject 2 mL into the appropriate muscle as directed by prescriber Every 30 (Thirty) Days. 6 mL 3   • venlafaxine XR (Effexor XR) 75 MG 24 hr capsule Take 1 capsule by mouth Daily. 90 capsule 1   • [DISCONTINUED] thiamine (B-1) 100 MG/ML injection Inject 2 mL into the appropriate muscle as directed by prescriber Daily. 2 mL 0     Current Facility-Administered Medications on File Prior to Visit   Medication Dose Route Frequency Provider Last Rate Last Admin   • cyanocobalamin injection 1,000 mcg  1,000 mcg Intramuscular Q28 Days Kehrer, Meredith Lea, MD   1,000 mcg at 03/16/21 1515   • thiamine (B-1) injection 200 mg  200 mg Intramuscular Daily Kehrer, Meredith Lea, MD   200 mg at 09/26/22 1143       Allergies   Allergen Reactions   • Codeine Hallucinations       Past Medical History:   Diagnosis Date   • Abnormal EEG    • Acute upper respiratory infection    • Allergic conjunctivitis    • Allergic rhinitis    • Anemia    • Anesthesia complication     HARD TO AWAKEN   • Arthritis    • Asthma    • Atopic dermatitis    • BMI 40.0-44.9, adult ([facility])    • Bronchitis    • Cataract    • Cervical neuritis    • Cervical stenosis of spine    • Cervicalgia    • Chronic obstructive asthma with acute exacerbation ([facility])    • DDD (degenerative disc disease), lumbar    • Depression    • Disease of thyroid gland    • Dizzinesses    • Dysphagia     RESOLVED AFTER SURG   • Erosive esophagitis    • Facet arthritis of cervical region    • Generalized osteoarthritis of multiple sites    • GERD (gastroesophageal reflux disease)    • Hemangioma    • History of esophageal stricture    • HL (hearing loss)    • Hypercholesteremia    • Hyperlipidemia    • Hypertension    • Hypokalemia    • Hypothyroidism    • Influenza A with respiratory manifestations    • Internal hemorrhoids    • Intestinal malabsorption    • Low back pain    • Mixed  hyperlipidemia    • Multiple joint complaints    • Need for prophylactic vaccination against Streptococcus pneumoniae (pneumococcus) and influenza    • Neoplasm of uncertain behavior of skin    • JAY on CPAP     NEW SLEEP TESTDONE SEPT, TRIAL OF NOT WEARING CPAP   • Osteopenia of lumbar spine    • Osteopenia of spine    • Osteoporosis    • Pain in arm    • Panniculitis    • PONV (postoperative nausea and vomiting)    • Screening for malignant neoplasm of breast    • Screening for malignant neoplasm of cervix    • Sleep apnea    • SOB (shortness of breath)    • Thiamine deficiency    • TIA (transient ischemic attack)    • Vitamin B deficiency    • Vitamin B12 deficiency    • Vitamin D deficiency    • Wears glasses        Past Surgical History:   Procedure Laterality Date   • ABDOMINOPLASTY N/A 11/01/2019    Procedure: ABDOMINOPLASTY;  Surgeon: Waterhouse, Maurine, MD;  Location: [facility];  Service: Plastics   • BARIATRIC SURGERY     • CARDIAC CATHETERIZATION  10/2009    Abnormal blood vessels   • CARPAL TUNNEL RELEASE Left    • CERVICAL DISC SURGERY  04/21/2016    bones spurs removed as well   • CERVICAL SPINE SURGERY  04/20/2017   • CHOLECYSTECTOMY     • CLOSED REDUCTION WRIST FRACTURE  09/02/2021   • COLONOSCOPY     • DIAGNOSTIC LAPAROSCOPY     • ELBOW PROCEDURE Left     release of nerve similar to carpal tunnel   • EPIDURAL BLOCK      every 3 months.    • EYE SURGERY Left 2000    CYST, CATARACT   • FRACTURE SURGERY     • GASTRIC BYPASS  10/2010       • HAND SURGERY Right    • HEMORRHOIDECTOMY     • HYSTERECTOMY     • INGUINAL HERNIA REPAIR Bilateral    • JOINT REPLACEMENT     • OOPHORECTOMY     • PANNICULECTOMY N/A 11/01/2019    Procedure: PANNICULECTOMY;  Surgeon: Waterhouse, Maurine, MD;  Location: [facility];  Service: Plastics   • REPLACEMENT TOTAL KNEE BILATERAL     • RIB FRACTURE SURGERY      1ST RIB REMOVED, DUE NUMBNESS IN ARM   • ROTATOR CUFF REPAIR Bilateral    • SPINE SURGERY    "      Social History     Socioeconomic History   • Marital status:    Tobacco Use   • Smoking status: Never   • Smokeless tobacco: Never   Vaping Use   • Vaping Use: Never used   Substance and Sexual Activity   • Alcohol use: Yes     Alcohol/week: 1.0 standard drink     Types: 1 Glasses of wine per week     Comment: rare use/some caffeine use   • Drug use: No   • Sexual activity: Not Currently     Partners: Male       Family History   Problem Relation Age of Onset   • Arthritis Mother    • Diabetes Mother    • Heart disease Mother    • Vision loss Mother    • Vasculitis Father         Cerebral   • Coronary artery disease Father    • Early death Father    • Hearing loss Father    • Heart disease Father    • Cancer Sister    • Diabetes Sister    • Hypertension Sister    • Diabetes Sister    • Diabetes Brother    • Diabetes Brother    • Breast cancer Paternal Aunt    • Diabetes Other    • Malig Hyperthermia Neg Hx    • Colon cancer Neg Hx    • Colon polyps Neg Hx    • Crohn's disease Neg Hx    • Irritable bowel syndrome Neg Hx    • Ulcerative colitis Neg Hx        The following portions of the patient's history were reviewed and updated as appropriate: allergies, current medications, past family history, past medical history, past social history, past surgical history and problem list.       Objective:       Vitals:    10/27/22 1414   BP: 132/80   BP Location: Left arm   Patient Position: Sitting   Pulse: 82   Weight: 98.2 kg (216 lb 6.4 oz)   Height: 160 cm (63\")       Body mass index is 38.33 kg/m².    Physical Exam:  Constitutional: Well appearing, Well-developed, No acute distress   HENT: Oropharynx clear and membrane moist  Eyes: Normal conjunctiva, no sclera icterus.  Neck: Supple, no carotid bruit bilaterally.  Cardiovascular: Regular rate and rhythm, No Murmur, No bilateral lower extremity edema.  Pulmonary: Normal respiratory effort, Normal lung sounds, no wheezing.  Abdominal: Soft, nontender, no " hepatosplenomegaly, liver is non-pulsatile.  Neurological: Alert and orient x 3.   Skin: Warm, dry, no ecchymosis, no rash.  Psych: Appropriate mood and affect. Normal judgment and insight.      Lab Results   Component Value Date     09/26/2022     03/30/2022    K 4.3 09/26/2022    K 4.1 03/30/2022     09/26/2022     03/30/2022    CO2 30.5 (H) 09/26/2022    CO2 25 03/30/2022    BUN 15 09/26/2022    BUN 23 03/30/2022    CREATININE 0.84 09/26/2022    CREATININE 0.84 03/30/2022    EGFRIFNONA 65 02/08/2022    EGFRIFNONA 67 09/17/2021    EGFRIFAFRI 74 02/08/2022    EGFRIFAFRI 81 09/17/2021    GLUCOSE 90 09/26/2022    GLUCOSE 91 03/30/2022    CALCIUM 9.1 09/26/2022    CALCIUM 9.3 03/30/2022    PROTENTOTREF 6.5 09/26/2022    PROTENTOTREF 6.6 03/30/2022    ALBUMIN 4.20 09/26/2022    ALBUMIN 4.0 03/30/2022    BILITOT 0.5 09/26/2022    BILITOT 0.5 03/30/2022    AST 18 09/26/2022    AST 17 03/30/2022    ALT 15 09/26/2022    ALT 13 03/30/2022     Lab Results   Component Value Date    WBC 7.25 09/26/2022    WBC 9.4 02/08/2022    HGB 14.3 09/26/2022    HGB 14.3 02/08/2022    HCT 42.9 09/26/2022    HCT 42.8 02/08/2022    MCV 88.1 09/26/2022    MCV 88 02/08/2022     09/26/2022     02/08/2022     Lab Results   Component Value Date    TRIG 121 09/26/2022    TRIG 113 03/30/2022    HDL 89 (H) 09/26/2022    HDL 70 03/30/2022     (H) 09/26/2022     (H) 03/30/2022     No results found for: PROBNP, BNP  Lab Results   Component Value Date    CKTOTAL 106 03/16/2021     Lab Results   Component Value Date    TSH 0.799 09/26/2022    TSH 0.593 03/30/2022         ECG 12 Lead    Date/Time: 10/27/2022 4:54 PM  Performed by: Drew Thompson MD  Authorized by: Drew Thompson MD   Previous ECG: no previous ECG available  Rhythm: sinus rhythm        Cardiac CTA 10/20/2009 [facility]:  · Anomalous left main coronary artery with origin from separate ostium of the right coronary cusp  traversing and a retroaortic path and does not have an interarterial course.  · No significant coronary artery disease noted throughout the coronary arteries.            Assessment:          Diagnosis Plan   1. Preop cardiovascular exam  ECG 12 Lead      2. Anomalous origin of left main coronary artery from right coronary artery aortic sinus, with anomalous origin of right coronary artery from left main coronary artery aortic sinus  ECG 12 Lead             Plan:       Ms. Pendleton is 72 y.o. 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  From a cardiac perspective she is doing well clinically.  Also her EKG is normal.  She does have a history of anomalous left main coronary artery but this is only an anatomical variant and does not portend any higher cardiac risk.  Patient is a low risk patient undergoing a low risk procedure and may proceed forward as scheduled.    Preoperative cardiac evaluation:  · Patient is a low risk patient undergoing a low risk procedure.  No further cardiac testing needed at this time may proceed forward with scheduled procedure.    Anomalous left main coronary artery:  · Patient has not left main arising from the right coronary cusp taking a posterior course and not intra-arterial and thus a benign course  · Does not increase cardiovascular risk    Follow-up:  1 year    Thank you for allowing me to participate in the care of Octavia Pendleton. Feel free to contact me directly with any further questions or concerns.    Drew Thompson MD  [facility]  10/27/22  16:57 EDT

## 2022-10-31 ENCOUNTER — TELEPHONE (OUTPATIENT)
Dept: GASTROENTEROLOGY | Facility: CLINIC | Age: 72
End: 2022-10-31

## 2022-11-17 ENCOUNTER — INFUSION (OUTPATIENT)
Dept: ONCOLOGY | Facility: HOSPITAL | Age: 72
End: 2022-11-17

## 2022-11-17 VITALS
HEIGHT: 63 IN | WEIGHT: 214 LBS | RESPIRATION RATE: 18 BRPM | HEART RATE: 72 BPM | OXYGEN SATURATION: 96 % | BODY MASS INDEX: 37.92 KG/M2 | TEMPERATURE: 97.3 F

## 2022-11-17 DIAGNOSIS — J45.40 MODERATE PERSISTENT ASTHMA, UNSPECIFIED WHETHER COMPLICATED: Primary | ICD-10-CM

## 2022-11-17 PROCEDURE — 25010000002 OMALIZUMAB 150 MG/ML SOLUTION PREFILLED SYRINGE: Performed by: ALLERGY & IMMUNOLOGY

## 2022-11-17 PROCEDURE — 96372 THER/PROPH/DIAG INJ SC/IM: CPT

## 2022-11-17 RX ADMIN — OMALIZUMAB 150 MG: 150 INJECTION, SOLUTION SUBCUTANEOUS at 14:10

## 2022-11-22 ENCOUNTER — HOSPITAL ENCOUNTER (OUTPATIENT)
Facility: SURGERY CENTER | Age: 72
Setting detail: HOSPITAL OUTPATIENT SURGERY
Discharge: HOME | End: 2022-11-22
Attending: INTERNAL MEDICINE | Admitting: INTERNAL MEDICINE

## 2022-11-22 ENCOUNTER — ANESTHESIA EVENT (OUTPATIENT)
Dept: SURGERY | Facility: SURGERY CENTER | Age: 72
End: 2022-11-22

## 2022-11-22 ENCOUNTER — ANESTHESIA (OUTPATIENT)
Dept: SURGERY | Facility: SURGERY CENTER | Age: 72
End: 2022-11-22

## 2022-11-22 VITALS
TEMPERATURE: 97.1 F | WEIGHT: 209 LBS | DIASTOLIC BLOOD PRESSURE: 96 MMHG | RESPIRATION RATE: 16 BRPM | SYSTOLIC BLOOD PRESSURE: 156 MMHG | BODY MASS INDEX: 37.02 KG/M2 | OXYGEN SATURATION: 96 % | HEART RATE: 74 BPM

## 2022-11-22 DIAGNOSIS — Z98.84 S/P GASTRIC BYPASS: ICD-10-CM

## 2022-11-22 DIAGNOSIS — Z12.11 ENCOUNTER FOR SCREENING COLONOSCOPY: ICD-10-CM

## 2022-11-22 PROCEDURE — 25010000002 PROPOFOL 10 MG/ML EMULSION: Performed by: ANESTHESIOLOGY

## 2022-11-22 PROCEDURE — C1726 CATH, BAL DIL, NON-VASCULAR: HCPCS | Performed by: INTERNAL MEDICINE

## 2022-11-22 PROCEDURE — 88305 TISSUE EXAM BY PATHOLOGIST: CPT | Performed by: INTERNAL MEDICINE

## 2022-11-22 PROCEDURE — 25010000002 PHENYLEPHRINE 10 MG/ML SOLUTION: Performed by: ANESTHESIOLOGY

## 2022-11-22 PROCEDURE — 45385 COLONOSCOPY W/LESION REMOVAL: CPT | Performed by: INTERNAL MEDICINE

## 2022-11-22 PROCEDURE — 43249 ESOPH EGD DILATION <30 MM: CPT | Performed by: INTERNAL MEDICINE

## 2022-11-22 RX ORDER — SODIUM CHLORIDE, SODIUM LACTATE, POTASSIUM CHLORIDE, CALCIUM CHLORIDE 600; 310; 30; 20 MG/100ML; MG/100ML; MG/100ML; MG/100ML
30 INJECTION, SOLUTION INTRAVENOUS CONTINUOUS PRN
Status: DISCONTINUED | OUTPATIENT
Start: 2022-11-22 | End: 2022-11-22 | Stop reason: HOSPADM

## 2022-11-22 RX ORDER — MAGNESIUM HYDROXIDE 1200 MG/15ML
LIQUID ORAL AS NEEDED
Status: DISCONTINUED | OUTPATIENT
Start: 2022-11-22 | End: 2022-11-22 | Stop reason: HOSPADM

## 2022-11-22 RX ORDER — PHENYLEPHRINE HYDROCHLORIDE 10 MG/ML
INJECTION INTRAVENOUS AS NEEDED
Status: DISCONTINUED | OUTPATIENT
Start: 2022-11-22 | End: 2022-11-22 | Stop reason: SURG

## 2022-11-22 RX ORDER — SODIUM CHLORIDE 0.9 % (FLUSH) 0.9 %
3 SYRINGE (ML) INJECTION EVERY 12 HOURS SCHEDULED
Status: DISCONTINUED | OUTPATIENT
Start: 2022-11-22 | End: 2022-11-22 | Stop reason: HOSPADM

## 2022-11-22 RX ORDER — SODIUM CHLORIDE 0.9 % (FLUSH) 0.9 %
10 SYRINGE (ML) INJECTION AS NEEDED
Status: DISCONTINUED | OUTPATIENT
Start: 2022-11-22 | End: 2022-11-22 | Stop reason: HOSPADM

## 2022-11-22 RX ORDER — PROPOFOL 10 MG/ML
VIAL (ML) INTRAVENOUS AS NEEDED
Status: DISCONTINUED | OUTPATIENT
Start: 2022-11-22 | End: 2022-11-22 | Stop reason: SURG

## 2022-11-22 RX ORDER — LIDOCAINE HYDROCHLORIDE 20 MG/ML
INJECTION, SOLUTION INFILTRATION; PERINEURAL AS NEEDED
Status: DISCONTINUED | OUTPATIENT
Start: 2022-11-22 | End: 2022-11-22 | Stop reason: SURG

## 2022-11-22 RX ORDER — SODIUM CHLORIDE, SODIUM LACTATE, POTASSIUM CHLORIDE, CALCIUM CHLORIDE 600; 310; 30; 20 MG/100ML; MG/100ML; MG/100ML; MG/100ML
INJECTION, SOLUTION INTRAVENOUS CONTINUOUS PRN
Status: DISCONTINUED | OUTPATIENT
Start: 2022-11-22 | End: 2022-11-22 | Stop reason: SURG

## 2022-11-22 RX ADMIN — PROPOFOL 80 MG: 10 INJECTION, EMULSION INTRAVENOUS at 08:28

## 2022-11-22 RX ADMIN — SODIUM CHLORIDE, SODIUM LACTATE, POTASSIUM CHLORIDE, AND CALCIUM CHLORIDE: .6; .31; .03; .02 INJECTION, SOLUTION INTRAVENOUS at 08:25

## 2022-11-22 RX ADMIN — PHENYLEPHRINE HYDROCHLORIDE 100 MCG: 10 INJECTION INTRAVENOUS at 08:42

## 2022-11-22 RX ADMIN — PROPOFOL 200 MCG/KG/MIN: 10 INJECTION, EMULSION INTRAVENOUS at 08:28

## 2022-11-22 RX ADMIN — SODIUM CHLORIDE, POTASSIUM CHLORIDE, SODIUM LACTATE AND CALCIUM CHLORIDE 30 ML/HR: 600; 310; 30; 20 INJECTION, SOLUTION INTRAVENOUS at 07:38

## 2022-11-22 RX ADMIN — LIDOCAINE HYDROCHLORIDE 60 MG: 20 INJECTION, SOLUTION INFILTRATION; PERINEURAL at 08:28

## 2022-11-22 NOTE — ANESTHESIA PREPROCEDURE EVALUATION
" Anesthesia Evaluation     Patient summary reviewed and Nursing notes reviewed   history of anesthetic complications: PONV               Airway   Mallampati: III  No difficulty expected  Dental      Pulmonary    (+) COPD moderate, asthma,shortness of breath, sleep apnea on CPAP,   Cardiovascular   Exercise tolerance: poor (<4 METS)    ECG reviewed  Rhythm: regular  Rate: normal    (+) hypertension 2 medications or greater, CAD, hyperlipidemia,     ROS comment: From cardiac clearance \"does have a history of anomalous left main coronary artery but this is only an anatomical variant and does not portend any higher cardiac risk\"    Neuro/Psych  (+) TIA, headaches, dizziness/light headedness, numbness, psychiatric history Anxiety,    GI/Hepatic/Renal/Endo    (+) morbid obesity, GERD, PUD,  thyroid problem hypothyroidism    Musculoskeletal     (+) neck pain,   Abdominal    Substance History - negative use     OB/GYN negative ob/gyn ROS         Other   arthritis,                      Anesthesia Plan    ASA 3     MAC     intravenous induction     Anesthetic plan, risks, benefits, and alternatives have been provided, discussed and informed consent has been obtained with: spouse/significant other and patient.      "

## 2022-11-22 NOTE — ANESTHESIA POSTPROCEDURE EVALUATION
Patient: Octavia Pendleton    Procedure Summary     Date: 11/22/22 Room / Location: [facility] / [facility]    Anesthesia Start: 0825 Anesthesia Stop: 0901    Procedures:       ESOPHAGOGASTRODUODENOSCOPY with dilation      COLONOSCOPY with polypectomy Diagnosis:       S/P gastric bypass      Encounter for screening colonoscopy      (S/P gastric bypass [Z98.84])      (Encounter for screening colonoscopy [Z12.11])    Surgeons: Agustín Rolon MD Provider: Peggy Betancur MD    Anesthesia Type: MAC ASA Status: 3          Anesthesia Type: MAC    Vitals  Vitals Value Taken Time   /78 11/22/22 0904   Temp     Pulse 80 11/22/22 0904   Resp 16 11/22/22 0904   SpO2 95 % 11/22/22 0904           Post Anesthesia Care and Evaluation    Patient location during evaluation: bedside  Patient participation: complete - patient participated  Level of consciousness: awake  Pain management: adequate    Airway patency: patent  Anesthetic complications: No anesthetic complications    Cardiovascular status: acceptable  Respiratory status: acceptable  Hydration status: acceptable    Comments: /78   Pulse 80   Temp 36.2 °C (97.1 °F) (Temporal)   Resp 16   Wt 94.8 kg (209 lb)   SpO2 95%   BMI 37.02 kg/m²

## 2022-11-22 NOTE — H&P
No chief complaint on file.      HPI  Patient today for an EGD due to GERD and dysphagia and a colonoscopy for screening.  She has a history of gastric bypass.         Problem List:    Patient Active Problem List   Diagnosis   • Asthma, moderate persistent   • JAY on CPAP   • Chronic fatigue   • S/P abdominoplasty   • Panniculus   • Hyperlipidemia   • Hypothyroidism   • Vitamin B12 deficiency   • Vitamin D deficiency   • Thiamine deficiency   • S/P gastric bypass   • GERD (gastroesophageal reflux disease)   • Depression   • Class 2 obesity   • Snoring   • Morning headache   • Left flank pain   • Encounter for screening colonoscopy   • Lumbar radiculopathy   • Anomalous origin of left main coronary artery from right coronary artery aortic sinus, with anomalous origin of right coronary artery from left main coronary artery aortic sinus       Medical History:    Past Medical History:   Diagnosis Date   • Abnormal EEG    • Acute upper respiratory infection    • Allergic conjunctivitis    • Allergic rhinitis    • Anemia    • Anesthesia complication     HARD TO AWAKEN   • Arthritis    • Asthma    • Atopic dermatitis    • BMI 40.0-44.9, adult ([facility])    • Bronchitis    • Cataract    • Cervical neuritis    • Cervical stenosis of spine    • Cervicalgia    • Chronic obstructive asthma with acute exacerbation ([facility])    • DDD (degenerative disc disease), lumbar    • Depression    • Disease of thyroid gland    • Dizzinesses    • Dysphagia     RESOLVED AFTER SURG   • Erosive esophagitis    • Facet arthritis of cervical region    • Generalized osteoarthritis of multiple sites    • GERD (gastroesophageal reflux disease)    • Hemangioma    • History of esophageal stricture    • HL (hearing loss)    • Hypercholesteremia    • Hyperlipidemia    • Hypertension    • Hypokalemia    • Hypothyroidism    • Influenza A with respiratory manifestations    • Internal hemorrhoids    • Intestinal malabsorption    • Low back pain    • Mixed  hyperlipidemia    • Multiple joint complaints    • Need for prophylactic vaccination against Streptococcus pneumoniae (pneumococcus) and influenza    • Neoplasm of uncertain behavior of skin    • JAY on CPAP     NEW SLEEP TESTDONE SEPT, TRIAL OF NOT WEARING CPAP   • Osteopenia of lumbar spine    • Osteopenia of spine    • Osteoporosis    • Pain in arm    • Panniculitis    • PONV (postoperative nausea and vomiting)    • Screening for malignant neoplasm of breast    • Screening for malignant neoplasm of cervix    • Sleep apnea    • SOB (shortness of breath)    • Thiamine deficiency    • TIA (transient ischemic attack)    • Vitamin B deficiency    • Vitamin B12 deficiency    • Vitamin D deficiency    • Wears glasses         Social History:    Social History     Socioeconomic History   • Marital status:    Tobacco Use   • Smoking status: Never   • Smokeless tobacco: Never   Vaping Use   • Vaping Use: Never used   Substance and Sexual Activity   • Alcohol use: Yes     Alcohol/week: 1.0 standard drink     Types: 1 Glasses of wine per week     Comment: rare use/some caffeine use   • Drug use: No   • Sexual activity: Not Currently     Partners: Male       Family History:   Family History   Problem Relation Age of Onset   • Arthritis Mother    • Diabetes Mother    • Heart disease Mother    • Vision loss Mother    • Vasculitis Father         Cerebral   • Coronary artery disease Father    • Early death Father    • Hearing loss Father    • Heart disease Father    • Cancer Sister    • Diabetes Sister    • Hypertension Sister    • Diabetes Sister    • Diabetes Brother    • Diabetes Brother    • Breast cancer Paternal Aunt    • Diabetes Other    • Malig Hyperthermia Neg Hx    • Colon cancer Neg Hx    • Colon polyps Neg Hx    • Crohn's disease Neg Hx    • Irritable bowel syndrome Neg Hx    • Ulcerative colitis Neg Hx        Surgical History:   Past Surgical History:   Procedure Laterality Date   • ABDOMINOPLASTY N/A  11/01/2019    Procedure: ABDOMINOPLASTY;  Surgeon: Waterhouse, Maurine, MD;  Location: [facility] OR;  Service: Plastics   • BARIATRIC SURGERY     • CARDIAC CATHETERIZATION  10/2009    Abnormal blood vessels   • CARPAL TUNNEL RELEASE Left    • CERVICAL DISC SURGERY  04/21/2016    bones spurs removed as well   • CERVICAL SPINE SURGERY  04/20/2017   • CHOLECYSTECTOMY     • CLOSED REDUCTION WRIST FRACTURE  09/02/2021   • COLONOSCOPY     • DIAGNOSTIC LAPAROSCOPY     • ELBOW PROCEDURE Left     release of nerve similar to carpal tunnel   • EPIDURAL BLOCK      every 3 months.    • EYE SURGERY Left 2000    CYST, CATARACT   • FRACTURE SURGERY     • GASTRIC BYPASS  10/2010       • HAND SURGERY Right    • HEMORRHOIDECTOMY     • HYSTERECTOMY     • INGUINAL HERNIA REPAIR Bilateral    • JOINT REPLACEMENT     • OOPHORECTOMY     • PANNICULECTOMY N/A 11/01/2019    Procedure: PANNICULECTOMY;  Surgeon: Waterhouse, Maurine, MD;  Location: [facility];  Service: Plastics   • REPLACEMENT TOTAL KNEE BILATERAL     • RIB FRACTURE SURGERY      1ST RIB REMOVED, DUE NUMBNESS IN ARM   • ROTATOR CUFF REPAIR Bilateral    • SPINE SURGERY           Current Facility-Administered Medications:   •  lactated ringers infusion, 30 mL/hr, Intravenous, Continuous PRN, Lorena English, APRN  •  sodium chloride 0.9 % flush 10 mL, 10 mL, Intravenous, PRN, Lorena English, APRN  •  sodium chloride 0.9 % flush 3 mL, 3 mL, Intravenous, Q12H, Lorena English, APRN    Allergies:   Allergies   Allergen Reactions   • Codeine Hallucinations        The following portions of the patient's history were reviewed by me and updated as appropriate: review of systems, allergies, current medications, past family history, past medical history, past social history, past surgical history and problem list.    There were no vitals filed for this visit.    PHYSICAL EXAM:    CONSTITUTIONAL:  today's vital signs reviewed by me  GASTROINTESTINAL: abdomen is soft nontender  nondistended with normal active bowel sounds, no masses are appreciated    Assessment/ Plan  We will proceed today with EGD and colonoscopy.    Risks and benefits as well as alternatives to endoscopic evaluation were explained to the patient and they voiced understanding and wish to proceed.  These risks include but are not limited to the risk of bleeding, perforation, adverse reaction to sedation, and missed lesions.  The patient was given the opportunity to ask questions prior to the endoscopic procedure.

## 2022-11-23 LAB
LAB AP CASE REPORT: NORMAL
LAB AP CLINICAL INFORMATION: NORMAL
PATH REPORT.FINAL DX SPEC: NORMAL
PATH REPORT.GROSS SPEC: NORMAL

## 2022-12-15 ENCOUNTER — INFUSION (OUTPATIENT)
Dept: ONCOLOGY | Facility: HOSPITAL | Age: 72
End: 2022-12-15

## 2022-12-15 VITALS
HEIGHT: 63 IN | DIASTOLIC BLOOD PRESSURE: 85 MMHG | OXYGEN SATURATION: 95 % | RESPIRATION RATE: 18 BRPM | SYSTOLIC BLOOD PRESSURE: 143 MMHG | BODY MASS INDEX: 38.24 KG/M2 | WEIGHT: 215.8 LBS | HEART RATE: 84 BPM | TEMPERATURE: 96.9 F

## 2022-12-15 DIAGNOSIS — J45.40 MODERATE PERSISTENT ASTHMA, UNSPECIFIED WHETHER COMPLICATED: Primary | ICD-10-CM

## 2022-12-15 PROCEDURE — 25010000002 OMALIZUMAB 150 MG/ML SOLUTION PREFILLED SYRINGE: Performed by: ALLERGY & IMMUNOLOGY

## 2022-12-15 PROCEDURE — 96372 THER/PROPH/DIAG INJ SC/IM: CPT

## 2022-12-15 RX ORDER — BNT162B2 ORIGINAL AND OMICRON BA.4/BA.5 .1125; .1125 MG/2.25ML; MG/2.25ML
INJECTION, SUSPENSION INTRAMUSCULAR
COMMUNITY
Start: 2022-10-18

## 2022-12-15 RX ADMIN — OMALIZUMAB 150 MG: 150 INJECTION, SOLUTION SUBCUTANEOUS at 13:45

## 2022-12-15 NOTE — NURSING NOTE
Patient tolerated xolair injection without difficulty, and declined to stay for 30 minutes post injection. Instructed to call her prescribing MD for any concerns prior to next appt. Discharged per ambulation.

## 2022-12-21 ENCOUNTER — OFFICE VISIT (OUTPATIENT)
Dept: GASTROENTEROLOGY | Facility: CLINIC | Age: 72
End: 2022-12-21

## 2022-12-21 VITALS
BODY MASS INDEX: 38.45 KG/M2 | WEIGHT: 217 LBS | HEIGHT: 63 IN | HEART RATE: 78 BPM | OXYGEN SATURATION: 96 % | DIASTOLIC BLOOD PRESSURE: 78 MMHG | TEMPERATURE: 97.8 F | SYSTOLIC BLOOD PRESSURE: 124 MMHG

## 2022-12-21 DIAGNOSIS — Z86.010 HX OF ADENOMATOUS COLONIC POLYPS: ICD-10-CM

## 2022-12-21 DIAGNOSIS — Z12.11 COLON CANCER SCREENING: ICD-10-CM

## 2022-12-21 DIAGNOSIS — K21.9 GASTROESOPHAGEAL REFLUX DISEASE, UNSPECIFIED WHETHER ESOPHAGITIS PRESENT: Primary | Chronic | ICD-10-CM

## 2022-12-21 DIAGNOSIS — K22.2 SCHATZKI'S RING: Chronic | ICD-10-CM

## 2022-12-21 DIAGNOSIS — K44.9 HIATAL HERNIA: Chronic | ICD-10-CM

## 2022-12-21 PROCEDURE — 99214 OFFICE O/P EST MOD 30 MIN: CPT | Performed by: NURSE PRACTITIONER

## 2022-12-21 NOTE — PATIENT INSTRUCTIONS
For GERD and hiatal hernia, continue esomeprazole 40 mg twice daily.    2.   Continue daily probiotic.     3.   You may continue to use Preparation H sparingly as needed for hemorrhoid flare ups.     4.  Next colonoscopy due November 2027 for colon cancer screening due to your history of colon polyps.     5.   Recommend annual follow up for reassessment of symptoms and med refills or sooner if symptoms worsen.

## 2022-12-21 NOTE — PROGRESS NOTES
Chief Complaint   Patient presents with   • Follow-up     GERD, hiatal hernia, follow up after scopes         History of Present Illness  72-year-old female presents the office today for follow-up.  She was last seen in office on 9/27/2022.  She has a history of GERD and gastric bypass surgery in 2008.  She underwent EGD and colonoscopy on 11/22/2022.  EGD revealed a 2 cm hiatal hernia, Schatzki's ring that was traversed and dilated with a balloon dilator, and previous evidence of gastric bypass surgery.  Colonoscopy revealed nonbleeding internal hemorrhoids and 1 6 mm colon polyp identified as a tubular adenoma on pathology.  Patient was recommended to undergo surveillance colonoscopy at a 5-year interval, which will be due November 2027.    She continues esomeprazole 40 mg twice daily. Symptoms are managed pretty well with occasional flare ups with reflux. Last flare was about 3 months ago and she experienced reflux and heartburn for several days in a row. She just worked through her symptoms and did not take any OTC medications. She denies any nausea or vomiting. She reports significant improvement with swallowing following esophageal dilation. She reports that she is able to swallow pills now without difficulty. She denies any abdominal pain. She reports a 15 lb weight gain over the past 2 months. She reports a change in her medication for depression, and the weight gain began after that. She plans to discuss this with her PCP.     She takes a daily probiotic as well as ferrous sulfate. She reports that her BMs are regular with some occasional constipation. She does have hemorrhoids that bleed on occasion. She uses preparation H suppositories as needed, generally 2-3 for a flare and may not need to use them again for several months. Most of the time stools are regular and formed.     Review of Systems   Constitutional: Positive for unexpected weight change. Negative for fever.   HENT: Negative for trouble  "swallowing.    Cardiovascular: Negative for chest pain.   Gastrointestinal: Negative for abdominal distention, abdominal pain, anal bleeding, blood in stool, constipation, diarrhea, nausea, rectal pain and vomiting.      Result Review :       Office Visit with Agustín Rolon MD (09/27/2022)  UPPER GI ENDOSCOPY (11/22/2022 08:24)  COLONOSCOPY (11/22/2022 08:23)  Tissue Pathology Exam (11/22/2022 08:50)    Vital Signs:   /78   Pulse 78   Temp 97.8 °F (36.6 °C)   Ht 160 cm (63\")   Wt 98.4 kg (217 lb)   SpO2 96%   BMI 38.44 kg/m²     Body mass index is 38.44 kg/m².     Physical Exam  Vitals reviewed.   Constitutional:       Appearance: Normal appearance.   Pulmonary:      Effort: Pulmonary effort is normal. No respiratory distress.   Abdominal:      General: Abdomen is flat. Bowel sounds are normal. There is no distension.      Palpations: Abdomen is soft. There is no mass.      Tenderness: There is no abdominal tenderness. There is no guarding.   Musculoskeletal:         General: Normal range of motion.   Skin:     General: Skin is warm and dry.   Neurological:      General: No focal deficit present.      Mental Status: She is alert and oriented to person, place, and time.   Psychiatric:         Mood and Affect: Mood normal.         Behavior: Behavior normal.         Thought Content: Thought content normal.         Judgment: Judgment normal.       Assessment and Plan    Diagnoses and all orders for this visit:    1. Gastroesophageal reflux disease, unspecified whether esophagitis present (Primary)    2. Hiatal hernia    3. Schatzki's ring    4. Hx of adenomatous colonic polyps    5. Colon cancer screening           Patient Instructions   1.  For GERD and hiatal hernia, continue esomeprazole 40 mg twice daily.    2.   Continue daily probiotic.     3.   You may continue to use Preparation H sparingly as needed for hemorrhoid flare ups.     4.  Next colonoscopy due November 2027 for colon cancer " screening due to your history of colon polyps.     5.   Recommend annual follow up for reassessment of symptoms and med refills or sooner if symptoms worsen.     Discussion:    EGD and colonoscopy findings and pathology reviewed with patient today during her office visit.  For further management of GERD and her hiatal hernia, patient to continue esomeprazole 40 mg twice daily.  This prescription is filled by her PCP office.  Patient to also continue antireflux precautions    For treatment of hemorrhoids, patient to continue use of Preparation H suppositories on an as-needed basis only.  Continue daily probiotic.  Next surveillance colonoscopy due November 2027 due to history of colon polyps.  Recommend annual office follow-up for reassessment of symptoms and med refills, or sooner should symptoms recur.  Patient verbalized understanding of above plan of care and is in agreement.  All questions answered and support provided.       EMR Dragon/Transcription Disclaimer:  This document has been Dictated utilizing Dragon dictation.

## 2023-01-04 ENCOUNTER — TELEPHONE (OUTPATIENT)
Dept: FAMILY MEDICINE CLINIC | Facility: CLINIC | Age: 73
End: 2023-01-04

## 2023-01-04 ENCOUNTER — OFFICE VISIT (OUTPATIENT)
Dept: FAMILY MEDICINE CLINIC | Facility: CLINIC | Age: 73
End: 2023-01-04
Payer: MEDICARE

## 2023-01-04 VITALS
DIASTOLIC BLOOD PRESSURE: 76 MMHG | BODY MASS INDEX: 38.02 KG/M2 | WEIGHT: 214.6 LBS | OXYGEN SATURATION: 96 % | SYSTOLIC BLOOD PRESSURE: 128 MMHG | HEART RATE: 71 BPM | HEIGHT: 63 IN | TEMPERATURE: 98.2 F

## 2023-01-04 DIAGNOSIS — R10.9 LEFT FLANK PAIN: ICD-10-CM

## 2023-01-04 DIAGNOSIS — F33.41 RECURRENT MAJOR DEPRESSIVE DISORDER, IN PARTIAL REMISSION: ICD-10-CM

## 2023-01-04 DIAGNOSIS — M51.34 THORACIC DEGENERATIVE DISC DISEASE: ICD-10-CM

## 2023-01-04 DIAGNOSIS — E51.9 THIAMINE DEFICIENCY: Primary | ICD-10-CM

## 2023-01-04 DIAGNOSIS — Z98.84 S/P GASTRIC BYPASS: ICD-10-CM

## 2023-01-04 DIAGNOSIS — E78.5 HYPERLIPIDEMIA, UNSPECIFIED HYPERLIPIDEMIA TYPE: ICD-10-CM

## 2023-01-04 DIAGNOSIS — E03.9 HYPOTHYROIDISM, UNSPECIFIED TYPE: ICD-10-CM

## 2023-01-04 DIAGNOSIS — I10 ESSENTIAL HYPERTENSION: ICD-10-CM

## 2023-01-04 PROCEDURE — 96372 THER/PROPH/DIAG INJ SC/IM: CPT | Performed by: FAMILY MEDICINE

## 2023-01-04 PROCEDURE — 99214 OFFICE O/P EST MOD 30 MIN: CPT | Performed by: FAMILY MEDICINE

## 2023-01-04 RX ORDER — BUPROPION HYDROCHLORIDE 150 MG/1
150 TABLET ORAL EVERY MORNING
Qty: 90 TABLET | Refills: 0 | Status: SHIPPED | OUTPATIENT
Start: 2023-01-04 | End: 2023-02-06

## 2023-01-04 RX ORDER — THIAMINE HYDROCHLORIDE 100 MG/ML
200 INJECTION, SOLUTION INTRAMUSCULAR; INTRAVENOUS DAILY
Status: SHIPPED | OUTPATIENT
Start: 2023-01-04

## 2023-01-04 RX ORDER — VENLAFAXINE HYDROCHLORIDE 37.5 MG/1
CAPSULE, EXTENDED RELEASE ORAL
Qty: 14 CAPSULE | Refills: 0 | Status: SHIPPED | OUTPATIENT
Start: 2023-01-04 | End: 2023-02-06 | Stop reason: SINTOL

## 2023-01-04 RX ADMIN — THIAMINE HYDROCHLORIDE 200 MG: 100 INJECTION, SOLUTION INTRAMUSCULAR; INTRAVENOUS at 08:30

## 2023-01-04 NOTE — TELEPHONE ENCOUNTER
----- Message from Meredith Lea Kehrer, MD sent at 1/4/2023 12:37 PM EST -----  Patient sees Dr. Chance Julian for pain management.  She has had left flank pain for over a year and has had extensive work-up.  I did an MRI of her thoracic spine and wonder if her lower thoracic arthritis on that side could be causing her symptoms.  I was wondering if he would consider doing an intervention through that area.  Please reach out to his office at [phone] and see if they need a copy of her thoracic MRI.  I would be glad to talk to him.

## 2023-01-04 NOTE — PROGRESS NOTES
Chief Complaint  Med Refill and Depression    Subjective        Octavia Pendleton presents to [facility]  History of Present Illness  The patient presents today for follow-up on her multiple chronic medical problems.    Depression  Ms. Penldeton is currently taking venlafaxine 37.5 mg daily, which has been helpful for her mood. She is due for a refill on 01/06/2023 or 01/07/2023.    Weight loss  Patient is concerned about her weight. She states she has been \" absolutely starving, \" and her weight today is 214 pounds. Before starting the venlafaxine, her weight was 202 pounds. She states she was excited to get her weight under 200 pounds, but she gained 10 pounds. She states a few weeks ago, her weight was 217.5 pounds. Today, her weight is down 3 pounds. She denies trying Wellbutrin in the past.    Left flank pain  Ms. Pendleton reports she had testing done, including a colonoscopy and upper endoscopy. She states she is still concerned with a sharp pain in her left flank. She states the pain is constant. The patient states the sharp knife-cutting pain is no longer present, but the pain has not subsided. She states it feels like a \" huge bruise \" and is swollen. The patient reports she has been unable to sleep on her left side for a long time. She reports that she is uncomfortable lying on her back. She has not seen urology. Patient did have a kidney stone, which has not moved.     Arthritis  Patient states she gets injections every 3 months for her lower back. She admits to having arthritis in the lower thoracic area. She states she is unsure if they have tried the lower thoracic region. The patient states she has a follow-up appointment with Dr. Julian on 01/11/2023.    Hypertension  Her blood pressure is within normal limits today.    Hyperlipidemia  Ms. Pendleton's  cholesterol was slightly elevated at her last visit. Her LDL was better. Her total cholesterol was higher. Her ferritin and  iron were good. Her B12 was lower than it had been. She states she has been taking her B12 supplement every 3 months at home.    Neuropathy  The patient reports she has not been in the bathtub for a long time because she fell and injured her legs. She states she is having trouble maintaining her balance in the shower.  She states she would like to try medical marijuana.  She is wondering about getting a walk-in tub.      Objective    A review of systems was performed, and the pertinent positives are noted in the HPI.  Vital Signs:  /76   Pulse 71   Temp 98.2 °F (36.8 °C)   Ht 160 cm (63\")   Wt 97.3 kg (214 lb 9.6 oz)   SpO2 96%   BMI 38.01 kg/m²   Estimated body mass index is 38.01 kg/m² as calculated from the following:    Height as of this encounter: 160 cm (63\").    Weight as of this encounter: 97.3 kg (214 lb 9.6 oz).          Physical Exam  Constitutional:       General: She is not in acute distress.     Appearance: Normal appearance. She is well-developed. She is obese.   HENT:      Head: Normocephalic and atraumatic.      Right Ear: Tympanic membrane, ear canal and external ear normal.      Left Ear: Tympanic membrane, ear canal and external ear normal.      Mouth/Throat:      Mouth: Mucous membranes are moist.      Pharynx: Oropharynx is clear.   Eyes:      Conjunctiva/sclera: Conjunctivae normal.      Pupils: Pupils are equal, round, and reactive to light.   Neck:      Thyroid: No thyromegaly.   Cardiovascular:      Rate and Rhythm: Normal rate and regular rhythm.      Heart sounds: No murmur heard.  Pulmonary:      Effort: Pulmonary effort is normal.      Breath sounds: Normal breath sounds. No wheezing.   Abdominal:      General: Bowel sounds are normal.      Palpations: Abdomen is soft.      Tenderness: There is no abdominal tenderness.   Musculoskeletal:         General: Normal range of motion.      Cervical back: Neck supple.   Lymphadenopathy:      Cervical: No cervical adenopathy.    Skin:     General: Skin is warm and dry.   Neurological:      Mental Status: She is alert and oriented to person, place, and time.   Psychiatric:         Mood and Affect: Mood normal.         Behavior: Behavior normal.        Result Review :                Assessment and Plan   Diagnoses and all orders for this visit:    1. Thiamine deficiency (Primary)  -     thiamine (B-1) injection 200 mg    2. Recurrent major depressive disorder, in partial remission (HCC)  -     buPROPion XL (Wellbutrin XL) 150 MG 24 hr tablet; Take 1 tablet by mouth Every Morning.  Dispense: 90 tablet; Refill: 0    3. Essential hypertension    4. Hypothyroidism, unspecified type    5. Hyperlipidemia, unspecified hyperlipidemia type    6. S/P gastric bypass    7. Left flank pain    8. Thoracic degenerative disc disease    Other orders  -     venlafaxine XR (Effexor XR) 37.5 MG 24 hr capsule; 1 tab daily for 1 week then every other day until gone  Dispense: 14 capsule; Refill: 0    Recurrent depression-we will try Wellbutrin and titrate off the venlafaxine, close follow-up, patient let me know if her mood gets worse  Hypertension-controlled  Hypothyroidism-labs are up-to-date  Left flank pain with thoracic degenerative disc disease-we will reach out to her pain specialist to see if he feels an injection might be worthwhile in that area.  Patient to let me know when she needs a prescription or letter supporting a walk-in tub.       Follow Up   Return in about 1 month (around 2/4/2023) for Recheck mood and weight.  Patient was given instructions and counseling regarding her condition or for health maintenance advice. Please see specific information pulled into the AVS if appropriate.       Answers for HPI/ROS submitted by the patient on 12/28/2022  Please describe your symptoms.: Follow-up for med change and review of testing.  Have you had these symptoms before?: Yes  How long have you been having these symptoms?: Greater than 2 weeks  Please  list any medications you are currently taking for this condition.: New med for depression causing weight gain.  Need to consider a change.  Test results provide  no explanation for left side back pain and soreness.  Please describe any probable cause for these symptoms. : Unknown  What is the primary reason for your visit?: Other    Transcribed from ambient dictation for Meredith Lea Kehrer, MD by Laila Srivastava.  01/04/23   10:24 EST    Patient or patient representative verbalized consent to the visit recording.  I have personally performed the services described in this document as transcribed by the above individual, and it is both accurate and complete.    Answers for HPI/ROS submitted by the patient on 12/28/2022  Please describe your symptoms.: Follow-up for med change and review of testing.  Have you had these symptoms before?: Yes  How long have you been having these symptoms?: Greater than 2 weeks  Please list any medications you are currently taking for this condition.: New med for depression causing weight gain.  Need to consider a change.  Test results provide  no explanation for left side back pain and soreness.  Please describe any probable cause for these symptoms. : Unknown  What is the primary reason for your visit?: Other

## 2023-01-12 ENCOUNTER — INFUSION (OUTPATIENT)
Dept: ONCOLOGY | Facility: HOSPITAL | Age: 73
End: 2023-01-12
Payer: MEDICARE

## 2023-01-12 VITALS
RESPIRATION RATE: 18 BRPM | BODY MASS INDEX: 38.8 KG/M2 | TEMPERATURE: 97.4 F | HEART RATE: 86 BPM | WEIGHT: 219 LBS | OXYGEN SATURATION: 97 % | HEIGHT: 63 IN | DIASTOLIC BLOOD PRESSURE: 78 MMHG | SYSTOLIC BLOOD PRESSURE: 131 MMHG

## 2023-01-12 DIAGNOSIS — J45.40 MODERATE PERSISTENT ASTHMA, UNSPECIFIED WHETHER COMPLICATED: Primary | ICD-10-CM

## 2023-01-12 PROCEDURE — 25010000002 OMALIZUMAB 150 MG/ML SOLUTION PREFILLED SYRINGE: Performed by: ALLERGY & IMMUNOLOGY

## 2023-01-12 PROCEDURE — 96372 THER/PROPH/DIAG INJ SC/IM: CPT

## 2023-01-12 RX ADMIN — OMALIZUMAB 150 MG: 150 INJECTION, SOLUTION SUBCUTANEOUS at 13:49

## 2023-01-12 NOTE — NURSING NOTE
Patient tolerated xolair injections without difficulty, and declined 30 minute post injection monitoring. Instructed to call her prescribing MD for any concerns prior to next appt. Discharged in stable condition.

## 2023-01-23 RX ORDER — VENLAFAXINE HYDROCHLORIDE 37.5 MG/1
CAPSULE, EXTENDED RELEASE ORAL
Qty: 14 CAPSULE | Refills: 0 | OUTPATIENT
Start: 2023-01-23

## 2023-02-06 ENCOUNTER — OFFICE VISIT (OUTPATIENT)
Dept: FAMILY MEDICINE CLINIC | Facility: CLINIC | Age: 73
End: 2023-02-06
Payer: MEDICARE

## 2023-02-06 VITALS
OXYGEN SATURATION: 98 % | WEIGHT: 216.2 LBS | HEIGHT: 63 IN | SYSTOLIC BLOOD PRESSURE: 128 MMHG | BODY MASS INDEX: 38.31 KG/M2 | TEMPERATURE: 98.2 F | HEART RATE: 93 BPM | DIASTOLIC BLOOD PRESSURE: 70 MMHG

## 2023-02-06 DIAGNOSIS — I10 ESSENTIAL HYPERTENSION: ICD-10-CM

## 2023-02-06 DIAGNOSIS — F33.41 RECURRENT MAJOR DEPRESSIVE DISORDER, IN PARTIAL REMISSION: Primary | ICD-10-CM

## 2023-02-06 PROCEDURE — 99213 OFFICE O/P EST LOW 20 MIN: CPT | Performed by: FAMILY MEDICINE

## 2023-02-06 RX ORDER — HYDROXYZINE HYDROCHLORIDE 10 MG/1
10 TABLET, FILM COATED ORAL 3 TIMES DAILY PRN
Qty: 30 TABLET | Refills: 0 | Status: SHIPPED | OUTPATIENT
Start: 2023-02-06 | End: 2023-03-15 | Stop reason: SDUPTHER

## 2023-02-06 NOTE — PROGRESS NOTES
"Chief Complaint  Med Refill, Depression, and Weight Check    Subjective        Octavia Pendleton presents to [facility]  History of Present Illness  The patient presents today for follow-up on depression.    Depression  The patient states that her mood is \"horrible\". She states the venlafaxine had helped; however, she was gaining weight. She was then started on Wellbutrin, which is not helping her depression. The patient states this week she has been tearing up, which she does not do. She then adds that she has been a \"nervous wreck\". She states she has been  for 55 years and she has been in [place] for the last 3 weeks. The patient states that last week she thought she was going to go home, get a train, get a divorce, pack her bags and go. She states that it is unusual for her. She states that normally she would be patient. She states she flew home this morning, and almost was in a full blown asthma attack by the time she entered the airplane. The patient declines wanting to go back on Lexapro or Effexor.  She denies any SI or HI.  Objective   Vital Signs:  /70   Pulse 93   Temp 98.2 °F (36.8 °C)   Ht 160 cm (63\")   Wt 98.1 kg (216 lb 3.2 oz)   SpO2 98%   BMI 38.30 kg/m²   Estimated body mass index is 38.3 kg/m² as calculated from the following:    Height as of this encounter: 160 cm (63\").    Weight as of this encounter: 98.1 kg (216 lb 3.2 oz).             Physical Exam  Constitutional:       General: She is not in acute distress.     Appearance: Normal appearance. She is well-developed.   HENT:      Head: Normocephalic and atraumatic.      Right Ear: External ear normal.      Left Ear: External ear normal.   Eyes:      Conjunctiva/sclera: Conjunctivae normal.      Pupils: Pupils are equal, round, and reactive to light.   Neck:      Thyroid: No thyromegaly.   Pulmonary:      Effort: Pulmonary effort is normal.   Neurological:      Mental Status: She is alert and " oriented to person, place, and time.   Psychiatric:         Mood and Affect: Mood normal.         Behavior: Behavior normal.        Result Review :                   Assessment and Plan   Diagnoses and all orders for this visit:    1. Recurrent major depressive disorder, in partial remission (HCC) (Primary)  -     Vortioxetine HBr (Trintellix) 10 MG tablet tablet; Take 1 tablet by mouth Daily With Breakfast.  Dispense: 30 tablet; Refill: 1  -     hydrOXYzine (ATARAX) 10 MG tablet; Take 1 tablet by mouth 3 (Three) Times a Day As Needed for Anxiety.  Dispense: 30 tablet; Refill: 0    2. Essential hypertension    1. Depression.  - The patient will start on Trintellix.  We will give her some hydroxyzine as needed.  Let me know if Trintellix is not covered by insurance.  Stop the Wellbutrin.    The patient will follow-up in 1 month.        Transcribed from ambient dictation for Meredith Lea Kehrer, MD by Lizbeth Srivastava.  02/06/23   15:17 EST    Patient or patient representative verbalized consent to the visit recording.  I have personally performed the services described in this document as transcribed by the above individual, and it is both accurate and complete.    Follow Up   No follow-ups on file.  Patient was given instructions and counseling regarding her condition or for health maintenance advice. Please see specific information pulled into the AVS if appropriate.

## 2023-02-09 ENCOUNTER — INFUSION (OUTPATIENT)
Dept: ONCOLOGY | Facility: HOSPITAL | Age: 73
End: 2023-02-09
Payer: MEDICARE

## 2023-02-09 VITALS
HEIGHT: 63 IN | HEART RATE: 87 BPM | OXYGEN SATURATION: 94 % | TEMPERATURE: 97.4 F | WEIGHT: 216.4 LBS | SYSTOLIC BLOOD PRESSURE: 148 MMHG | RESPIRATION RATE: 18 BRPM | BODY MASS INDEX: 38.34 KG/M2 | DIASTOLIC BLOOD PRESSURE: 86 MMHG

## 2023-02-09 DIAGNOSIS — J45.40 MODERATE PERSISTENT ASTHMA, UNSPECIFIED WHETHER COMPLICATED: Primary | ICD-10-CM

## 2023-02-09 PROCEDURE — 25010000002 OMALIZUMAB 150 MG/ML SOLUTION PREFILLED SYRINGE: Performed by: ALLERGY & IMMUNOLOGY

## 2023-02-09 PROCEDURE — 96372 THER/PROPH/DIAG INJ SC/IM: CPT

## 2023-02-09 RX ADMIN — OMALIZUMAB 150 MG: 150 INJECTION, SOLUTION SUBCUTANEOUS at 13:45

## 2023-03-09 ENCOUNTER — INFUSION (OUTPATIENT)
Dept: ONCOLOGY | Facility: HOSPITAL | Age: 73
End: 2023-03-09
Payer: MEDICARE

## 2023-03-09 VITALS
DIASTOLIC BLOOD PRESSURE: 82 MMHG | RESPIRATION RATE: 18 BRPM | TEMPERATURE: 96.8 F | OXYGEN SATURATION: 93 % | WEIGHT: 216.4 LBS | SYSTOLIC BLOOD PRESSURE: 142 MMHG | HEIGHT: 64 IN | HEART RATE: 84 BPM | BODY MASS INDEX: 36.95 KG/M2

## 2023-03-09 DIAGNOSIS — J45.40 MODERATE PERSISTENT ASTHMA, UNSPECIFIED WHETHER COMPLICATED: Primary | ICD-10-CM

## 2023-03-09 PROCEDURE — 25010000002 OMALIZUMAB 150 MG/ML SOLUTION PREFILLED SYRINGE: Performed by: ALLERGY & IMMUNOLOGY

## 2023-03-09 PROCEDURE — 96372 THER/PROPH/DIAG INJ SC/IM: CPT

## 2023-03-09 RX ADMIN — OMALIZUMAB 150 MG: 150 INJECTION, SOLUTION SUBCUTANEOUS at 12:52

## 2023-03-09 NOTE — NURSING NOTE
Patient tolerated xolair injections without difficulty, and not  monitored for 30 minutes post injections (pt denied need) and was dc'd with no S/S of reaction. Instructed to call her prescribing MD for any concerns prior to next appt. Discharged per ambulation.     PT REPORTS WORSENING OF ALLERGY TYPE SYMPTOMS LATELY CAUSING PT TO USE INHALERS. PT ADVISED TO F/U W/ HER ALLERGIST. PT V/U.

## 2023-03-15 ENCOUNTER — OFFICE VISIT (OUTPATIENT)
Dept: FAMILY MEDICINE CLINIC | Facility: CLINIC | Age: 73
End: 2023-03-15
Payer: MEDICARE

## 2023-03-15 VITALS
DIASTOLIC BLOOD PRESSURE: 82 MMHG | OXYGEN SATURATION: 98 % | HEIGHT: 64 IN | WEIGHT: 222.6 LBS | TEMPERATURE: 98.2 F | HEART RATE: 78 BPM | SYSTOLIC BLOOD PRESSURE: 132 MMHG | BODY MASS INDEX: 38 KG/M2

## 2023-03-15 DIAGNOSIS — Z98.84 S/P GASTRIC BYPASS: ICD-10-CM

## 2023-03-15 DIAGNOSIS — E51.9 THIAMINE DEFICIENCY: ICD-10-CM

## 2023-03-15 DIAGNOSIS — J22 LOWER RESP. TRACT INFECTION: ICD-10-CM

## 2023-03-15 DIAGNOSIS — E53.8 VITAMIN B12 DEFICIENCY: ICD-10-CM

## 2023-03-15 DIAGNOSIS — I10 ESSENTIAL HYPERTENSION: Primary | ICD-10-CM

## 2023-03-15 DIAGNOSIS — E78.5 HYPERLIPIDEMIA, UNSPECIFIED HYPERLIPIDEMIA TYPE: ICD-10-CM

## 2023-03-15 DIAGNOSIS — E03.9 HYPOTHYROIDISM, UNSPECIFIED TYPE: ICD-10-CM

## 2023-03-15 DIAGNOSIS — F33.41 RECURRENT MAJOR DEPRESSIVE DISORDER, IN PARTIAL REMISSION: ICD-10-CM

## 2023-03-15 DIAGNOSIS — E61.1 IRON DEFICIENCY: ICD-10-CM

## 2023-03-15 PROCEDURE — 96372 THER/PROPH/DIAG INJ SC/IM: CPT | Performed by: FAMILY MEDICINE

## 2023-03-15 PROCEDURE — 99214 OFFICE O/P EST MOD 30 MIN: CPT | Performed by: FAMILY MEDICINE

## 2023-03-15 RX ORDER — HYDROXYZINE HYDROCHLORIDE 10 MG/1
10 TABLET, FILM COATED ORAL 3 TIMES DAILY PRN
Qty: 90 TABLET | Refills: 1 | Status: SHIPPED | OUTPATIENT
Start: 2023-03-15

## 2023-03-15 RX ORDER — HYDROCODONE BITARTRATE AND HOMATROPINE METHYLBROMIDE ORAL SOLUTION 5; 1.5 MG/5ML; MG/5ML
LIQUID ORAL
Qty: 118 ML | Refills: 0 | Status: SHIPPED | OUTPATIENT
Start: 2023-03-15

## 2023-03-15 RX ADMIN — THIAMINE HYDROCHLORIDE 200 MG: 100 INJECTION, SOLUTION INTRAMUSCULAR; INTRAVENOUS at 14:26

## 2023-03-15 NOTE — PROGRESS NOTES
"Chief Complaint  Med Refill, Depression, went to urgent care  (Cough congestion ), and Fall (On Friday  )    Subjective        Octavia Pendleton presents to [facility]  History of Present Illness  Has been sick for 3 weeks with a cough.  Ended up going to urgent care and was given a steroid and antibiotics.  Finally feeling better.  Chest got tight, O2 dropped.   Fell when she was sick and hurt her right leg and ankle.  Has been getting better now.  Knee still just a little sore.    Does notice an improvement in her mood on the trintellix.  Was surprised at how much the atarax helped her.     Has been trying to get a walk in tub.  Has bad neuropathy in her feet.  Balance issue has caused falls and she has a problem standing in the shower.  Has fallen getting in to a regular tub.    She is also due for labs for multiple chronic medical problems.  She is compliant and her blood pressures been under control.      Objective   Vital Signs:  /82   Pulse 78   Temp 98.2 °F (36.8 °C)   Ht 161.3 cm (63.5\")   Wt 101 kg (222 lb 9.6 oz)   SpO2 98%   BMI 38.81 kg/m²   Estimated body mass index is 38.81 kg/m² as calculated from the following:    Height as of this encounter: 161.3 cm (63.5\").    Weight as of this encounter: 101 kg (222 lb 9.6 oz).             Physical Exam  Constitutional:       General: She is not in acute distress.     Appearance: Normal appearance. She is well-developed. She is obese.   HENT:      Head: Normocephalic and atraumatic.      Right Ear: Tympanic membrane, ear canal and external ear normal.      Left Ear: Tympanic membrane, ear canal and external ear normal.      Mouth/Throat:      Mouth: Mucous membranes are moist.      Pharynx: Oropharynx is clear.   Eyes:      Conjunctiva/sclera: Conjunctivae normal.      Pupils: Pupils are equal, round, and reactive to light.   Neck:      Thyroid: No thyromegaly.   Cardiovascular:      Rate and Rhythm: Normal rate and " regular rhythm.      Heart sounds: No murmur heard.  Pulmonary:      Effort: Pulmonary effort is normal.      Breath sounds: Normal breath sounds. No wheezing.   Abdominal:      General: Bowel sounds are normal.      Palpations: Abdomen is soft.      Tenderness: There is no abdominal tenderness.   Musculoskeletal:         General: Normal range of motion.      Cervical back: Neck supple.   Lymphadenopathy:      Cervical: No cervical adenopathy.   Skin:     General: Skin is warm and dry.   Neurological:      Mental Status: She is alert and oriented to person, place, and time.   Psychiatric:         Mood and Affect: Mood normal.         Behavior: Behavior normal.        Result Review :                   Assessment and Plan   Diagnoses and all orders for this visit:    1. Essential hypertension (Primary)  -     CBC & Differential  -     Comprehensive Metabolic Panel    2. Recurrent major depressive disorder, in partial remission (HCC)  -     hydrOXYzine (ATARAX) 10 MG tablet; Take 1 tablet by mouth 3 (Three) Times a Day As Needed for Anxiety.  Dispense: 90 tablet; Refill: 1  -     Vortioxetine HBr (Trintellix) 10 MG tablet tablet; Take 1 tablet by mouth Daily With Breakfast.  Dispense: 90 tablet; Refill: 1    3. Lower resp. tract infection  -     HYDROcodone Bit-Homatrop MBr (HYCODAN) 5-1.5 MG/5ML solution; 1-2 tsp po q6 hours prn  Dispense: 118 mL; Refill: 0    4. Thiamine deficiency  -     Vitamin B1, Whole Blood    5. Hyperlipidemia, unspecified hyperlipidemia type    6. Hypothyroidism, unspecified type  -     TSH    7. S/P gastric bypass  -     CBC & Differential  -     Comprehensive Metabolic Panel  -     TSH  -     Vitamin B12  -     Vitamin B1, Whole Blood  -     Iron  -     Ferritin    8. Vitamin B12 deficiency  -     TSH    9. Iron deficiency  -     Iron  -     Ferritin    Hypertension-controlled  Depression-improved  Respiratory tract infection- cough medicine refilled, finish out antibiotics, no more steroids  at this point         Follow Up   Return in about 3 months (around 6/15/2023) for Recheck depression.  Patient was given instructions and counseling regarding her condition or for health maintenance advice. Please see specific information pulled into the AVS if appropriate.       Answers for HPI/ROS submitted by the patient on 3/8/2023  Please describe your symptoms.: Monitoring of new medication.  Also severe cough.  Have you had these symptoms before?: No  How long have you been having these symptoms?: 1-2 weeks  Please list any medications you are currently taking for this condition.: Xopenex thru nebulizer, albuterhol inhaler and nyqul.  What is the primary reason for your visit?: Other

## 2023-03-17 DIAGNOSIS — K21.9 GASTROESOPHAGEAL REFLUX DISEASE WITHOUT ESOPHAGITIS: Chronic | ICD-10-CM

## 2023-03-17 RX ORDER — ESOMEPRAZOLE MAGNESIUM 40 MG/1
CAPSULE, DELAYED RELEASE ORAL
Qty: 180 CAPSULE | Refills: 3 | Status: SHIPPED | OUTPATIENT
Start: 2023-03-17

## 2023-03-20 LAB
ALBUMIN SERPL-MCNC: 4 G/DL (ref 3.8–4.8)
ALBUMIN/GLOB SERPL: 1.5 {RATIO} (ref 1.2–2.2)
ALP SERPL-CCNC: 93 IU/L (ref 44–121)
ALT SERPL-CCNC: 16 IU/L (ref 0–32)
AST SERPL-CCNC: 20 IU/L (ref 0–40)
BASOPHILS # BLD AUTO: 0 X10E3/UL (ref 0–0.2)
BASOPHILS NFR BLD AUTO: 0 %
BILIRUB SERPL-MCNC: <0.2 MG/DL (ref 0–1.2)
BUN SERPL-MCNC: 22 MG/DL (ref 8–27)
BUN/CREAT SERPL: 27 (ref 12–28)
CALCIUM SERPL-MCNC: 9.2 MG/DL (ref 8.7–10.3)
CHLORIDE SERPL-SCNC: 101 MMOL/L (ref 96–106)
CO2 SERPL-SCNC: 23 MMOL/L (ref 20–29)
CREAT SERPL-MCNC: 0.81 MG/DL (ref 0.57–1)
EGFRCR SERPLBLD CKD-EPI 2021: 78 ML/MIN/1.73
EOSINOPHIL # BLD AUTO: 0 X10E3/UL (ref 0–0.4)
EOSINOPHIL NFR BLD AUTO: 0 %
ERYTHROCYTE [DISTWIDTH] IN BLOOD BY AUTOMATED COUNT: 12.6 % (ref 11.7–15.4)
FERRITIN SERPL-MCNC: 128 NG/ML (ref 15–150)
GLOBULIN SER CALC-MCNC: 2.6 G/DL (ref 1.5–4.5)
GLUCOSE SERPL-MCNC: 115 MG/DL (ref 70–99)
HCT VFR BLD AUTO: 40.2 % (ref 34–46.6)
HGB BLD-MCNC: 13.3 G/DL (ref 11.1–15.9)
IMM GRANULOCYTES # BLD AUTO: 0.1 X10E3/UL (ref 0–0.1)
IMM GRANULOCYTES NFR BLD AUTO: 1 %
IRON SERPL-MCNC: 70 UG/DL (ref 27–139)
LYMPHOCYTES # BLD AUTO: 1.8 X10E3/UL (ref 0.7–3.1)
LYMPHOCYTES NFR BLD AUTO: 18 %
MCH RBC QN AUTO: 29.1 PG (ref 26.6–33)
MCHC RBC AUTO-ENTMCNC: 33.1 G/DL (ref 31.5–35.7)
MCV RBC AUTO: 88 FL (ref 79–97)
MONOCYTES # BLD AUTO: 0.4 X10E3/UL (ref 0.1–0.9)
MONOCYTES NFR BLD AUTO: 5 %
NEUTROPHILS # BLD AUTO: 7.3 X10E3/UL (ref 1.4–7)
NEUTROPHILS NFR BLD AUTO: 76 %
PLATELET # BLD AUTO: 437 X10E3/UL (ref 150–450)
POTASSIUM SERPL-SCNC: 4.4 MMOL/L (ref 3.5–5.2)
PROT SERPL-MCNC: 6.6 G/DL (ref 6–8.5)
RBC # BLD AUTO: 4.57 X10E6/UL (ref 3.77–5.28)
SODIUM SERPL-SCNC: 140 MMOL/L (ref 134–144)
TSH SERPL DL<=0.005 MIU/L-ACNC: 0.35 UIU/ML (ref 0.45–4.5)
VIT B1 BLD-SCNC: 208.5 NMOL/L (ref 66.5–200)
VIT B12 SERPL-MCNC: 683 PG/ML (ref 232–1245)
WBC # BLD AUTO: 9.7 X10E3/UL (ref 3.4–10.8)

## 2023-04-01 DIAGNOSIS — F33.41 RECURRENT MAJOR DEPRESSIVE DISORDER, IN PARTIAL REMISSION: ICD-10-CM

## 2023-04-03 RX ORDER — BUPROPION HYDROCHLORIDE 150 MG/1
TABLET ORAL
Qty: 90 TABLET | Refills: 0 | OUTPATIENT
Start: 2023-04-03

## 2023-04-06 ENCOUNTER — INFUSION (OUTPATIENT)
Dept: ONCOLOGY | Facility: HOSPITAL | Age: 73
End: 2023-04-06
Payer: MEDICARE

## 2023-04-06 VITALS
WEIGHT: 220.2 LBS | DIASTOLIC BLOOD PRESSURE: 76 MMHG | HEIGHT: 64 IN | BODY MASS INDEX: 37.59 KG/M2 | SYSTOLIC BLOOD PRESSURE: 117 MMHG | TEMPERATURE: 96.9 F | HEART RATE: 76 BPM | OXYGEN SATURATION: 93 % | RESPIRATION RATE: 18 BRPM

## 2023-04-06 DIAGNOSIS — J45.40 MODERATE PERSISTENT ASTHMA, UNSPECIFIED WHETHER COMPLICATED: Primary | ICD-10-CM

## 2023-04-06 PROCEDURE — 25010000002 OMALIZUMAB 150 MG/ML SOLUTION PREFILLED SYRINGE: Performed by: ALLERGY & IMMUNOLOGY

## 2023-04-06 PROCEDURE — 96372 THER/PROPH/DIAG INJ SC/IM: CPT

## 2023-04-06 RX ADMIN — OMALIZUMAB 150 MG: 150 INJECTION, SOLUTION SUBCUTANEOUS at 08:27

## 2023-04-06 NOTE — NURSING NOTE
Patient tolerated xolair injections without difficulty, and pt declined to be monitored for 30 minutes post injections. Instructed to call her prescribing MD for any concerns prior to next appt. Discharged per ambulation in stable condition.

## 2023-05-04 ENCOUNTER — INFUSION (OUTPATIENT)
Dept: ONCOLOGY | Facility: HOSPITAL | Age: 73
End: 2023-05-04
Payer: MEDICARE

## 2023-05-04 VITALS
HEIGHT: 64 IN | SYSTOLIC BLOOD PRESSURE: 144 MMHG | BODY MASS INDEX: 37.05 KG/M2 | OXYGEN SATURATION: 95 % | WEIGHT: 217 LBS | TEMPERATURE: 97.8 F | RESPIRATION RATE: 18 BRPM | DIASTOLIC BLOOD PRESSURE: 83 MMHG | HEART RATE: 91 BPM

## 2023-05-04 DIAGNOSIS — J45.40 MODERATE PERSISTENT ASTHMA, UNSPECIFIED WHETHER COMPLICATED: Primary | ICD-10-CM

## 2023-05-04 PROCEDURE — 96372 THER/PROPH/DIAG INJ SC/IM: CPT

## 2023-05-04 PROCEDURE — 25010000002 OMALIZUMAB 150 MG/ML SOLUTION PREFILLED SYRINGE: Performed by: ALLERGY & IMMUNOLOGY

## 2023-05-04 RX ADMIN — OMALIZUMAB 150 MG: 150 INJECTION, SOLUTION SUBCUTANEOUS at 08:35

## 2023-05-04 NOTE — NURSING NOTE
Patient tolerated xolair injections without difficulty, pt denied post wait,no S/S of reaction. Instructed to call her prescribing MD for any concerns prior to next appt. Discharged per ambulation with VSS.

## 2023-06-01 ENCOUNTER — INFUSION (OUTPATIENT)
Dept: ONCOLOGY | Facility: HOSPITAL | Age: 73
End: 2023-06-01

## 2023-06-01 VITALS
HEIGHT: 64 IN | WEIGHT: 218.2 LBS | RESPIRATION RATE: 15 BRPM | SYSTOLIC BLOOD PRESSURE: 128 MMHG | DIASTOLIC BLOOD PRESSURE: 79 MMHG | OXYGEN SATURATION: 95 % | BODY MASS INDEX: 37.25 KG/M2 | HEART RATE: 78 BPM | TEMPERATURE: 98 F

## 2023-06-01 DIAGNOSIS — J45.40 MODERATE PERSISTENT ASTHMA, UNSPECIFIED WHETHER COMPLICATED: Primary | ICD-10-CM

## 2023-06-01 PROCEDURE — 25010000002 OMALIZUMAB 150 MG/ML SOLUTION PREFILLED SYRINGE: Performed by: ALLERGY & IMMUNOLOGY

## 2023-06-01 PROCEDURE — 96372 THER/PROPH/DIAG INJ SC/IM: CPT

## 2023-06-01 RX ADMIN — OMALIZUMAB 150 MG: 150 INJECTION, SOLUTION SUBCUTANEOUS at 08:50

## 2023-06-01 NOTE — NURSING NOTE
Patient tolerated xolair injections without difficulty. Pt declined 30 minute wait post injection as she stated she has tolerated well. Instructed to call her prescribing MD for any concerns prior to next appt. Discharged per ambulation in stable condition.

## 2023-07-06 PROBLEM — R06.83 SNORING: Status: RESOLVED | Noted: 2021-10-28 | Resolved: 2023-07-06

## 2023-07-11 ENCOUNTER — TELEPHONE (OUTPATIENT)
Dept: FAMILY MEDICINE CLINIC | Facility: CLINIC | Age: 73
End: 2023-07-11

## 2023-07-11 NOTE — TELEPHONE ENCOUNTER
Caller: GRETA    Relationship: KATLYN    Best call back number: [phone]    What is the best time to reach you: ANY    Who are you requesting to speak with (clinical staff, provider,  specific staff member): MAKAYLA    Do you know the name of the person who called: BRICECURTIS    What was the call regarding: SHE NEEDS A PRIOR AUTHORIZATION    Is it okay if the provider responds through MyChart: NO

## 2023-07-11 NOTE — TELEPHONE ENCOUNTER
WAS SPEAKING WITH KECIA FROM [facility] TRIED TRANSFERRING CALL BUT CALL WAS DISCONNECTED.

## 2023-07-27 ENCOUNTER — CLINICAL SUPPORT (OUTPATIENT)
Dept: FAMILY MEDICINE CLINIC | Facility: CLINIC | Age: 73
End: 2023-07-27
Payer: MEDICARE

## 2023-07-27 ENCOUNTER — INFUSION (OUTPATIENT)
Dept: ONCOLOGY | Facility: HOSPITAL | Age: 73
End: 2023-07-27
Payer: MEDICARE

## 2023-07-27 VITALS
HEIGHT: 64 IN | DIASTOLIC BLOOD PRESSURE: 78 MMHG | RESPIRATION RATE: 15 BRPM | OXYGEN SATURATION: 96 % | SYSTOLIC BLOOD PRESSURE: 117 MMHG | WEIGHT: 219.4 LBS | HEART RATE: 77 BPM | TEMPERATURE: 96.6 F | BODY MASS INDEX: 37.46 KG/M2

## 2023-07-27 DIAGNOSIS — J45.40 MODERATE PERSISTENT ASTHMA, UNSPECIFIED WHETHER COMPLICATED: Primary | ICD-10-CM

## 2023-07-27 DIAGNOSIS — E51.9 THIAMINE DEFICIENCY: Primary | ICD-10-CM

## 2023-07-27 PROCEDURE — 25010000002 OMALIZUMAB 150 MG/ML SOLUTION PREFILLED SYRINGE: Performed by: ALLERGY & IMMUNOLOGY

## 2023-07-27 PROCEDURE — 96372 THER/PROPH/DIAG INJ SC/IM: CPT

## 2023-07-27 RX ORDER — THIAMINE HYDROCHLORIDE 100 MG/ML
100 INJECTION, SOLUTION INTRAMUSCULAR; INTRAVENOUS DAILY
Status: SHIPPED | OUTPATIENT
Start: 2023-07-27

## 2023-07-27 RX ADMIN — OMALIZUMAB 150 MG: 150 INJECTION, SOLUTION SUBCUTANEOUS at 08:47

## 2023-07-27 RX ADMIN — THIAMINE HYDROCHLORIDE 100 MG: 100 INJECTION, SOLUTION INTRAMUSCULAR; INTRAVENOUS at 09:36

## 2023-07-27 NOTE — NURSING NOTE
Patient tolerated xolair injection without difficulty, and declined 30 minute post injections. Instructed to call her prescribing MD for any concerns prior to next appt. Discharged per ambulation with VSS.

## 2023-08-21 ENCOUNTER — OFFICE VISIT (OUTPATIENT)
Dept: FAMILY MEDICINE CLINIC | Facility: CLINIC | Age: 73
End: 2023-08-21
Payer: MEDICARE

## 2023-08-21 VITALS
DIASTOLIC BLOOD PRESSURE: 86 MMHG | OXYGEN SATURATION: 98 % | SYSTOLIC BLOOD PRESSURE: 120 MMHG | TEMPERATURE: 98.6 F | WEIGHT: 220.2 LBS | HEART RATE: 83 BPM | HEIGHT: 64 IN | BODY MASS INDEX: 37.59 KG/M2

## 2023-08-21 DIAGNOSIS — T78.49XA ALLERGIC REACTION TO ADHESIVE: Primary | ICD-10-CM

## 2023-08-21 PROCEDURE — 99213 OFFICE O/P EST LOW 20 MIN: CPT | Performed by: NURSE PRACTITIONER

## 2023-08-21 RX ORDER — PREDNISONE 20 MG/1
TABLET ORAL
Qty: 12 TABLET | Refills: 0 | Status: SHIPPED | OUTPATIENT
Start: 2023-08-21 | End: 2023-08-29

## 2023-08-21 NOTE — PROGRESS NOTES
"Chief Complaint  Rash (On left side of neck and shoulder, had basil cell removed and was allergic to bandage)    Subjective        Octavia Pendleton presents to [facility]  History of Present Illness    Patient is a patient of Dr. Meredith Kehrer.  She presents today with complaint of rash on left side of neck and shoulder.  She had a basal cell carcinoma removed and was allergic to bandage on it.  Had this done 2 weeks ago.  Hasn't used for over a week    OTC: benadryl cream, neosporin     Objective   Vital Signs:  /86   Pulse 83   Temp 98.6 øF (37 øC)   Ht 161.9 cm (63.75\")   Wt 99.9 kg (220 lb 3.2 oz)   SpO2 98%   BMI 38.09 kg/mý   Estimated body mass index is 38.09 kg/mý as calculated from the following:    Height as of this encounter: 161.9 cm (63.75\").    Weight as of this encounter: 99.9 kg (220 lb 3.2 oz).             Physical Exam  Constitutional:       Appearance: Normal appearance.   Cardiovascular:      Rate and Rhythm: Normal rate and regular rhythm.      Pulses: Normal pulses.   Pulmonary:      Effort: Pulmonary effort is normal.      Breath sounds: Normal breath sounds.   Skin:     General: Skin is warm and dry.      Findings: Rash (erythematous rash on right chest, extending to neck) present.   Neurological:      Mental Status: She is alert and oriented to person, place, and time.   Psychiatric:         Mood and Affect: Mood normal.         Behavior: Behavior normal.         Thought Content: Thought content normal.         Judgment: Judgment normal.      Result Review :                   Assessment and Plan   Diagnoses and all orders for this visit:    1. Allergic reaction to adhesive (Primary)    Other orders  -     predniSONE (DELTASONE) 20 MG tablet; Take 1 tablet by mouth 2 (Two) Times a Day for 4 days, THEN 1 tablet Daily for 4 days.  Dispense: 12 tablet; Refill: 0      We will treat rash with steroid.  May continue to use Benadryl cream.  If no " resolution notify provider.  She verbalized understanding and is agreeable         Follow Up   No follow-ups on file.  Patient was given instructions and counseling regarding her condition or for health maintenance advice. Please see specific information pulled into the AVS if appropriate.

## 2023-08-24 ENCOUNTER — INFUSION (OUTPATIENT)
Dept: ONCOLOGY | Facility: HOSPITAL | Age: 73
End: 2023-08-24
Payer: MEDICARE

## 2023-08-24 VITALS
BODY MASS INDEX: 37.39 KG/M2 | HEART RATE: 70 BPM | WEIGHT: 219 LBS | OXYGEN SATURATION: 95 % | TEMPERATURE: 96.4 F | SYSTOLIC BLOOD PRESSURE: 144 MMHG | DIASTOLIC BLOOD PRESSURE: 84 MMHG | RESPIRATION RATE: 15 BRPM | HEIGHT: 64 IN

## 2023-08-24 DIAGNOSIS — J45.40 MODERATE PERSISTENT ASTHMA, UNSPECIFIED WHETHER COMPLICATED: Primary | ICD-10-CM

## 2023-08-24 PROCEDURE — 25010000002 OMALIZUMAB 150 MG/ML SOLUTION PREFILLED SYRINGE: Performed by: ALLERGY & IMMUNOLOGY

## 2023-08-24 PROCEDURE — 96372 THER/PROPH/DIAG INJ SC/IM: CPT

## 2023-08-24 RX ADMIN — OMALIZUMAB 150 MG: 150 INJECTION, SOLUTION SUBCUTANEOUS at 08:38

## 2023-08-24 NOTE — NURSING NOTE
Patient tolerated xolair injections without difficulty, and declined to monitored for 30 minutes post injections. Instructed to call her prescribing MD for any concerns prior to next appt. Discharged per ambulation.

## 2023-09-18 DIAGNOSIS — I10 ESSENTIAL HYPERTENSION: ICD-10-CM

## 2023-09-18 DIAGNOSIS — E03.9 HYPOTHYROIDISM, UNSPECIFIED TYPE: ICD-10-CM

## 2023-09-18 RX ORDER — HYDROCHLOROTHIAZIDE 12.5 MG/1
TABLET ORAL
Qty: 90 TABLET | Refills: 3 | Status: SHIPPED | OUTPATIENT
Start: 2023-09-18

## 2023-09-18 RX ORDER — LEVOTHYROXINE SODIUM 0.05 MG/1
TABLET ORAL
Qty: 90 TABLET | Refills: 3 | Status: SHIPPED | OUTPATIENT
Start: 2023-09-18

## 2023-09-21 ENCOUNTER — INFUSION (OUTPATIENT)
Dept: ONCOLOGY | Facility: HOSPITAL | Age: 73
End: 2023-09-21
Payer: MEDICARE

## 2023-09-21 VITALS
TEMPERATURE: 96.7 F | OXYGEN SATURATION: 95 % | HEIGHT: 64 IN | SYSTOLIC BLOOD PRESSURE: 136 MMHG | DIASTOLIC BLOOD PRESSURE: 77 MMHG | BODY MASS INDEX: 36.81 KG/M2 | WEIGHT: 215.6 LBS | HEART RATE: 77 BPM | RESPIRATION RATE: 15 BRPM

## 2023-09-21 DIAGNOSIS — J45.40 MODERATE PERSISTENT ASTHMA, UNSPECIFIED WHETHER COMPLICATED: Primary | ICD-10-CM

## 2023-09-21 PROCEDURE — 25010000002 OMALIZUMAB 150 MG/ML SOLUTION PREFILLED SYRINGE: Performed by: ALLERGY & IMMUNOLOGY

## 2023-09-21 PROCEDURE — 96372 THER/PROPH/DIAG INJ SC/IM: CPT

## 2023-09-21 RX ADMIN — OMALIZUMAB 150 MG: 150 INJECTION, SOLUTION SUBCUTANEOUS at 08:33

## 2023-09-21 NOTE — NURSING NOTE
Patient tolerated xolair injections without difficulty, no S/S of reaction. Instructed to call her prescribing MD for any concerns prior to next appt. Discharged per ambulation with VSS.

## 2023-09-22 DIAGNOSIS — F33.41 RECURRENT MAJOR DEPRESSIVE DISORDER, IN PARTIAL REMISSION: ICD-10-CM

## 2023-09-22 RX ORDER — VORTIOXETINE 10 MG/1
TABLET, FILM COATED ORAL
Qty: 90 TABLET | Refills: 0 | Status: SHIPPED | OUTPATIENT
Start: 2023-09-22

## 2023-10-03 DIAGNOSIS — F33.41 RECURRENT MAJOR DEPRESSIVE DISORDER, IN PARTIAL REMISSION: ICD-10-CM

## 2023-10-03 RX ORDER — HYDROXYZINE HYDROCHLORIDE 10 MG/1
TABLET, FILM COATED ORAL
Qty: 90 TABLET | Refills: 1 | Status: SHIPPED | OUTPATIENT
Start: 2023-10-03

## 2023-10-19 ENCOUNTER — INFUSION (OUTPATIENT)
Dept: ONCOLOGY | Facility: HOSPITAL | Age: 73
End: 2023-10-19
Payer: MEDICARE

## 2023-10-19 ENCOUNTER — OFFICE VISIT (OUTPATIENT)
Dept: FAMILY MEDICINE CLINIC | Facility: CLINIC | Age: 73
End: 2023-10-19
Payer: MEDICARE

## 2023-10-19 VITALS
HEART RATE: 82 BPM | SYSTOLIC BLOOD PRESSURE: 126 MMHG | WEIGHT: 213.6 LBS | HEIGHT: 64 IN | RESPIRATION RATE: 15 BRPM | OXYGEN SATURATION: 93 % | DIASTOLIC BLOOD PRESSURE: 81 MMHG | BODY MASS INDEX: 36.47 KG/M2 | TEMPERATURE: 98 F

## 2023-10-19 VITALS
OXYGEN SATURATION: 96 % | SYSTOLIC BLOOD PRESSURE: 120 MMHG | RESPIRATION RATE: 22 BRPM | BODY MASS INDEX: 36.47 KG/M2 | HEART RATE: 88 BPM | TEMPERATURE: 96.9 F | WEIGHT: 213.6 LBS | HEIGHT: 64 IN | DIASTOLIC BLOOD PRESSURE: 80 MMHG

## 2023-10-19 DIAGNOSIS — J01.90 ACUTE SINUSITIS, RECURRENCE NOT SPECIFIED, UNSPECIFIED LOCATION: ICD-10-CM

## 2023-10-19 DIAGNOSIS — J45.901 EXACERBATION OF PERSISTENT ASTHMA, UNSPECIFIED ASTHMA SEVERITY: Primary | ICD-10-CM

## 2023-10-19 DIAGNOSIS — J45.40 MODERATE PERSISTENT ASTHMA, UNSPECIFIED WHETHER COMPLICATED: Primary | ICD-10-CM

## 2023-10-19 PROCEDURE — 96372 THER/PROPH/DIAG INJ SC/IM: CPT

## 2023-10-19 PROCEDURE — 25010000002 OMALIZUMAB 150 MG/ML SOLUTION PREFILLED SYRINGE: Performed by: ALLERGY & IMMUNOLOGY

## 2023-10-19 RX ORDER — PREDNISONE 10 MG/1
TABLET ORAL
Qty: 1 EACH | Refills: 0 | Status: SHIPPED | OUTPATIENT
Start: 2023-10-20

## 2023-10-19 RX ORDER — AZITHROMYCIN 250 MG/1
TABLET, FILM COATED ORAL
Qty: 6 TABLET | Refills: 0 | Status: SHIPPED | OUTPATIENT
Start: 2023-10-19

## 2023-10-19 RX ORDER — IPRATROPIUM BROMIDE AND ALBUTEROL SULFATE 2.5; .5 MG/3ML; MG/3ML
3 SOLUTION RESPIRATORY (INHALATION)
Status: DISCONTINUED | OUTPATIENT
Start: 2023-10-19 | End: 2023-10-19

## 2023-10-19 RX ORDER — IPRATROPIUM BROMIDE AND ALBUTEROL SULFATE 2.5; .5 MG/3ML; MG/3ML
3 SOLUTION RESPIRATORY (INHALATION) ONCE
Status: COMPLETED | OUTPATIENT
Start: 2023-10-19 | End: 2023-10-19

## 2023-10-19 RX ORDER — TRIAMCINOLONE ACETONIDE 40 MG/ML
40 INJECTION, SUSPENSION INTRA-ARTICULAR; INTRAMUSCULAR ONCE
Status: COMPLETED | OUTPATIENT
Start: 2023-10-19 | End: 2023-10-19

## 2023-10-19 RX ADMIN — IPRATROPIUM BROMIDE AND ALBUTEROL SULFATE 3 ML: 2.5; .5 SOLUTION RESPIRATORY (INHALATION) at 13:42

## 2023-10-19 RX ADMIN — TRIAMCINOLONE ACETONIDE 40 MG: 40 INJECTION, SUSPENSION INTRA-ARTICULAR; INTRAMUSCULAR at 13:37

## 2023-10-19 RX ADMIN — OMALIZUMAB 150 MG: 150 INJECTION, SOLUTION SUBCUTANEOUS at 09:06

## 2023-10-19 NOTE — NURSING NOTE
Patient tolerated xolair injections without difficulty. Declines 30 minutes post injections. Instructed to call her prescribing MD for any concerns prior to next appt. Patient tolerated treatment without complaints. Discharged in stable condition.

## 2023-10-19 NOTE — PATIENT INSTRUCTIONS
Start over-the-counter nasal saline rinses 2 times daily, apply before Flonase  Start over-the-counter Flonase 1 puff in each nostril 2 times daily for 2 weeks, apply after saline rinse  Continue allegra  Call back if you have any worsening symptoms, fever 100.4 or above  Go to the hospital if you experience shortness of breath that does not respond to albuterol inhaler

## 2023-10-19 NOTE — PROGRESS NOTES
"Chief Complaint  Cough (A week.), Headache, and Sore Throat    Subjective        Octavia Pendleton presents to [facility]  History of Present Illness      73-year-old female with chronic asthma and allergies complains of worsening cough and sore throat for the past week.    Patient states her symptoms started after he started remodeling the basement and is still the drywall.   Patient has been coughing nonstop, she feels very congested in her sinuses and chest.  She feels short of breath and is using her inhaler a dozen times a day.  Patient denies fever, chills, chest pain, abdominal pain, N/V/D.        Objective   Vital Signs:  /80   Pulse 88   Temp 96.9 °F (36.1 °C)   Resp 22   Ht 161.3 cm (63.5\")   Wt 96.9 kg (213 lb 9.6 oz)   SpO2 96%   BMI 37.24 kg/m²   Estimated body mass index is 37.24 kg/m² as calculated from the following:    Height as of this encounter: 161.3 cm (63.5\").    Weight as of this encounter: 96.9 kg (213 lb 9.6 oz).               Physical Exam  Cardiovascular:      Rate and Rhythm: Normal rate and regular rhythm.      Pulses: Normal pulses.   Pulmonary:      Effort: Pulmonary effort is normal. No accessory muscle usage.      Breath sounds: No stridor or decreased air movement. Wheezing (Diffuse expiratory wheezing) present. No decreased breath sounds, rhonchi or rales.      Comments: Coughing severely  Musculoskeletal:      Right lower leg: No edema.      Left lower leg: No edema.   Skin:     Capillary Refill: Capillary refill takes less than 2 seconds.        Result Review :                   Assessment and Plan   Diagnoses and all orders for this visit:    1. Exacerbation of persistent asthma, unspecified asthma severity (Primary)  -     Discontinue: ipratropium-albuterol (DUO-NEB) nebulizer solution 3 mL  -     triamcinolone acetonide (KENALOG-40) injection 40 mg  -     predniSONE (DELTASONE) 10 MG (21) dose pack; Use as directed on package  " Dispense: 1 each; Refill: 0  -     azithromycin (Zithromax Z-Pee) 250 MG tablet; Take 2 tablets by mouth on day 1, then 1 tablet daily on days 2-5  Dispense: 6 tablet; Refill: 0  -     ipratropium-albuterol (DUO-NEB) nebulizer solution 3 mL    2. Acute sinusitis, recurrence not specified, unspecified location  -     predniSONE (DELTASONE) 10 MG (21) dose pack; Use as directed on package  Dispense: 1 each; Refill: 0  -     azithromycin (Zithromax Z-Pee) 250 MG tablet; Take 2 tablets by mouth on day 1, then 1 tablet daily on days 2-5  Dispense: 6 tablet; Refill: 0    Patient is having asthma exacerbation and acute sinusitis.  Has had nonresponding to Allegra, albuterol and Astelin nose spray    Performed DuoNeb session in office.  Patient felt much better afterwards.  Her wheezing significantly improved.  I will give patient a steroid injection 40 mg Kenalog  Steroid taper for 6 days  Continue Breo and albuterol inhaler  Azithromycin pack  Saline rinse twice daily followed by Flonase twice daily  Continue Allegra and Astelin spray  Advised patient to go to the hospital if she experience shortness of breath that does not respond to albuterol.  Advised patient to stay away from her basement           Follow Up   Return if symptoms worsen or fail to improve.  Patient was given instructions and counseling regarding her condition or for health maintenance advice. Please see specific information pulled into the AVS if appropriate.

## 2023-10-24 ENCOUNTER — OFFICE VISIT (OUTPATIENT)
Dept: FAMILY MEDICINE CLINIC | Facility: CLINIC | Age: 73
End: 2023-10-24
Payer: MEDICARE

## 2023-10-24 VITALS
SYSTOLIC BLOOD PRESSURE: 124 MMHG | HEART RATE: 74 BPM | BODY MASS INDEX: 36.29 KG/M2 | TEMPERATURE: 98.6 F | HEIGHT: 64 IN | DIASTOLIC BLOOD PRESSURE: 70 MMHG | WEIGHT: 212.6 LBS | OXYGEN SATURATION: 98 %

## 2023-10-24 DIAGNOSIS — E55.9 VITAMIN D DEFICIENCY, UNSPECIFIED: ICD-10-CM

## 2023-10-24 DIAGNOSIS — Z12.31 VISIT FOR SCREENING MAMMOGRAM: ICD-10-CM

## 2023-10-24 DIAGNOSIS — J45.50 SEVERE PERSISTENT ASTHMA, UNCOMPLICATED: ICD-10-CM

## 2023-10-24 DIAGNOSIS — E53.8 VITAMIN B12 DEFICIENCY: ICD-10-CM

## 2023-10-24 DIAGNOSIS — Z00.00 MEDICARE ANNUAL WELLNESS VISIT, SUBSEQUENT: Primary | ICD-10-CM

## 2023-10-24 DIAGNOSIS — F33.41 RECURRENT MAJOR DEPRESSIVE DISORDER, IN PARTIAL REMISSION: ICD-10-CM

## 2023-10-24 DIAGNOSIS — K21.9 GASTROESOPHAGEAL REFLUX DISEASE WITHOUT ESOPHAGITIS: ICD-10-CM

## 2023-10-24 DIAGNOSIS — E78.5 HYPERLIPIDEMIA, UNSPECIFIED HYPERLIPIDEMIA TYPE: ICD-10-CM

## 2023-10-24 DIAGNOSIS — Z98.84 S/P GASTRIC BYPASS: ICD-10-CM

## 2023-10-24 DIAGNOSIS — E03.9 HYPOTHYROIDISM, UNSPECIFIED TYPE: ICD-10-CM

## 2023-10-24 DIAGNOSIS — J45.901 EXACERBATION OF PERSISTENT ASTHMA, UNSPECIFIED ASTHMA SEVERITY: ICD-10-CM

## 2023-10-24 DIAGNOSIS — D51.8 OTHER VITAMIN B12 DEFICIENCY ANEMIA: ICD-10-CM

## 2023-10-24 DIAGNOSIS — Z91.81 AT MODERATE RISK FOR FALL: ICD-10-CM

## 2023-10-24 DIAGNOSIS — I10 ESSENTIAL HYPERTENSION: ICD-10-CM

## 2023-10-24 NOTE — PATIENT INSTRUCTIONS
Medicare Wellness  Personal Prevention Plan of Service     Date of Office Visit:    Encounter Provider:  Meredith Lea Kehrer, MD  Place of Service:  [facility]  Patient Name: Octavia Pendleton  :  1950    As part of the Medicare Wellness portion of your visit today, we are providing you with this personalized preventive plan of services (PPPS). This plan is based upon recommendations of the [place] Preventive Services Task Force (USPSTF) and the Advisory Committee on Immunization Practices (ACIP).    This lists the preventive care services that should be considered, and provides dates of when you are due. Items listed as completed are up-to-date and do not require any further intervention.    Health Maintenance   Topic Date Due    COVID-19 Vaccine (6 - 2023-24 season) 2023    ANNUAL WELLNESS VISIT  2023    LIPID PANEL  2023    BMI FOLLOWUP  2023    DXA SCAN  10/28/2025 (Originally 10/28/2022)    MAMMOGRAM  2023    COLORECTAL CANCER SCREENING  2027    TDAP/TD VACCINES (2 - Td or Tdap) 2031    HEPATITIS C SCREENING  Completed    INFLUENZA VACCINE  Completed    Pneumococcal Vaccine 65+  Completed    ZOSTER VACCINE  Completed    PAP SMEAR  Discontinued       Orders Placed This Encounter   Procedures    Mammo Screening Digital Tomosynthesis Bilateral With CAD     Standing Status:   Future     Standing Expiration Date:   10/24/2024     Order Specific Question:   Reason for Exam:     Answer:   screening     Order Specific Question:   Does this patient have a diabetic monitoring/medication delivering device on?     Answer:   No     Order Specific Question:   Release to patient     Answer:   Routine Release [[id number]]       Return in about 6 months (around 2024) for Recheck HTN.        Fall Prevention in the Home, Adult  Falls can cause injuries and affect people of all ages. There are many simple things that you can do to make  your home safe and to help prevent falls. Ask for help when making these changes, if needed.  What actions can I take to prevent falls?  General instructions  Use good lighting in all rooms. Replace any light bulbs that burn out, turn on lights if it is dark, and use night-lights.  Place frequently used items in easy-to-reach places. Lower the shelves around your home if necessary.  Set up furniture so that there are clear paths around it. Avoid moving your furniture around.  Remove throw rugs and other tripping hazards from the floor.  Avoid walking on wet floors.  Fix any uneven floor surfaces.  Add color or contrast paint or tape to grab bars and handrails in your home. Place contrasting color strips on the first and last steps of staircases.  When you use a stepladder, make sure that it is completely opened and that the sides and supports are firmly locked. Have someone hold the ladder while you are using it. Do not climb a closed stepladder.  Know where your pets are when moving through your home.  What can I do in the bathroom?         Keep the floor dry. Immediately clean up any water that is on the floor.  Remove soap buildup in the tub or shower regularly.  Use nonskid mats or decals on the floor of the tub or shower.  Attach bath mats securely with double-sided, nonslip rug tape.  If you need to sit down while you are in the shower, use a plastic, nonslip stool.  Install grab bars by the toilet and in the tub and shower. Do not use towel bars as grab bars.  What can I do in the bedroom?  Make sure that a bedside light is easy to reach.  Do not use oversized bedding that reaches the floor.  Have a firm chair that has side arms to use for getting dressed.  What can I do in the kitchen?  Clean up any spills right away.  If you need to reach for something above you, use a sturdy step stool that has a grab bar.  Keep electrical cables out of the way.  Do not use floor polish or wax that makes floors slippery.  If you must use wax, make sure that it is non-skid floor wax.  What can I do with my stairs?  Do not leave any items on the stairs.  Make sure that you have a light switch at the top and the bottom of the stairs. Have them installed if you do not have them.  Make sure that there are handrails on both sides of the stairs. Fix handrails that are broken or loose. Make sure that handrails are as long as the staircases.  Install non-slip stair treads on all stairs in your home.  Avoid having throw rugs at the top or bottom of stairs, or secure the rugs with carpet tape to prevent them from moving.  Choose a carpet design that does not hide the edge of steps on the stairs.  Check any carpeting to make sure that it is firmly attached to the stairs. Fix any carpet that is loose or worn.  What can I do on the outside of my home?  Use bright outdoor lighting.  Regularly repair the edges of walkways and driveways and fix any cracks.  Remove high doorway thresholds.  Trim any shrubbery on the main path into your home.  Regularly check that handrails are securely fastened and in good repair. Both sides of all steps should have handrails.  Install guardrails along the edges of any raised decks or porches.  Clear walkways of debris and clutter, including tools and rocks.  Have leaves, snow, and ice cleared regularly.  Use sand or salt on walkways during winter months.  In the garage, clean up any spills right away, including grease or oil spills.  What other actions can I take?  Wear closed-toe shoes that fit well and support your feet. Wear shoes that have rubber soles or low heels.  Use mobility aids as needed, such as canes, walkers, scooters, and crutches.  Review your medicines with your health care provider. Some medicines can cause dizziness or changes in blood pressure, which increase your risk of falling.  Talk with your health care provider about other ways that you can decrease your risk of falls. This may include  working with a physical therapist or  to improve your strength, balance, and endurance.  Where to find more information  Centers for Disease Control and Prevention, [place]: www.cdc.gov  [place] [employer] Aging: www.daniela.nih.gov  Contact a health care provider if:  You are afraid of falling at home.  You feel weak, drowsy, or dizzy at home.  You fall at home.  Summary  There are many simple things that you can do to make your home safe and to help prevent falls.  Ways to make your home safe include removing tripping hazards and installing grab bars in the bathroom.  Ask for help when making these changes in your home.  This information is not intended to replace advice given to you by your health care provider. Make sure you discuss any questions you have with your health care provider.  Document Revised: 09/19/2022 Document Reviewed: 07/21/2021  [employer]vier Patient Education © 2023 [employer]

## 2023-10-24 NOTE — PROGRESS NOTES
The ABCs of the Annual Wellness Visit  Subsequent Medicare Wellness Visit    Subjective    Octavia Pendleton is a 73 y.o. female who presents for a Subsequent Medicare Wellness Visit.    The following portions of the patient's history were reviewed and   updated as appropriate: allergies, current medications, past family history, past medical history, past social history, past surgical history, and problem list.    Compared to one year ago, the patient feels her physical   health is the same. Except SOA worsening. Injections in back helping.     Compared to one year ago, the patient feels her mental   health is better.    Recent Hospitalizations:  She was not admitted to the hospital during the last year.       Current Medical Providers:  Patient Care Team:  Kehrer, Meredith Lea, MD as PCP - General (Family Medicine)  Kehrer, Meredith Lea, MD as PCP - Family Medicine  Agustín Rolon MD as Consulting Physician (Gastroenterology)  Gwendolyn Silvestre APRN as Nurse Practitioner (Gastroenterology)    Outpatient Medications Prior to Visit   Medication Sig Dispense Refill    albuterol (PROVENTIL HFA;VENTOLIN HFA) 108 (90 BASE) MCG/ACT inhaler Inhale 2 puffs Every 4 (Four) Hours As Needed for Wheezing.      aspirin 81 MG chewable tablet Chew 1 tablet Daily.      azelastine (ASTELIN) 0.1 % nasal spray 1 spray into the nostril(s) as directed by provider 2 (Two) Times a Day. Use in each nostril as directed 30 mL 2    azelastine (OPTIVAR) 0.05 % ophthalmic solution Administer 1 drop to both eyes 2 (Two) Times a Day. 6 each 3    azithromycin (Zithromax Z-Pee) 250 MG tablet Take 2 tablets by mouth on day 1, then 1 tablet daily on days 2-5 6 tablet 0    cyanocobalamin 1000 MCG/ML injection Inject 1 mL under the skin into the appropriate area as directed Every 30 (Thirty) Days. 3 mL 3    EPINEPHrine (EPIPEN) 0.3 MG/0.3ML solution auto-injector injection       esomeprazole (nexIUM) 40 MG capsule TAKE ONE CAPSULE BY MOUTH TWICE  A  capsule 3    ferrous sulfate 325 (65 FE) MG tablet Take 1 tablet by mouth Daily With Breakfast. 90 tablet 3    fexofenadine (ALLEGRA) 180 MG tablet Take 1 tablet by mouth Daily.      fluticasone (FLONASE) 50 MCG/ACT nasal spray 2 sprays into the nostril(s) as directed by provider Daily. Administer 2 sprays in each nostril for each dose.      Fluticasone Furoate-Vilanterol (BREO ELLIPTA IN) Inhale 2 puffs Daily.      hydroCHLOROthiazide (HYDRODIURIL) 12.5 MG tablet TAKE ONE TABLET BY MOUTH DAILY 90 tablet 3    hydrOXYzine (ATARAX) 10 MG tablet TAKE ONE TABLET BY MOUTH THREE TIMES A DAY AS NEEDED FOR ANXIETY 90 tablet 1    levalbuterol (XOPENEX) 0.63 MG/3ML nebulizer solution Take 1 ampule by nebulization Every 4 (Four) Hours As Needed for Wheezing.      levothyroxine (SYNTHROID, LEVOTHROID) 50 MCG tablet TAKE ONE TABLET BY MOUTH DAILY 90 tablet 3    metaxalone (Skelaxin) 800 MG tablet Take 0.5-1 tablets by mouth 3 (Three) Times a Day As Needed for Muscle Spasms. 60 tablet 0    omalizumab (XOLAIR) 150 MG injection Inject 1.2 mL under the skin into the appropriate area as directed Every 30 (Thirty) Days.      potassium chloride (KLOR-CON) 20 MEQ CR tablet Take 1 tablet by mouth 2 (Two) Times a Day. 180 tablet 3    predniSONE (DELTASONE) 10 MG (21) dose pack Use as directed on package 1 each 0    Probiotic Product (FLORAJEN3) capsule Take 1 tablet by mouth daily.      thiamine (B-1) 100 MG/ML injection Inject 2 mL into the appropriate muscle as directed by prescriber Every 30 (Thirty) Days. 6 mL 3    Vortioxetine HBr (Trintellix) 10 MG tablet tablet TAKE ONE TABLET BY MOUTH DAILY WITH BREAKFAST 90 tablet 0    Pfizer COVID-19 Vac Bivalent 30 MCG/0.3ML suspension  (Patient not taking: Reported on 3/15/2023)       Facility-Administered Medications Prior to Visit   Medication Dose Route Frequency Provider Last Rate Last Admin    cyanocobalamin injection 1,000 mcg  1,000 mcg Intramuscular Q28 Days Kehrer, Meredith  "MD Sallie   1,000 mcg at 03/16/21 1515    thiamine (B-1) injection 100 mg  100 mg Intramuscular Daily Deny Izquierdo APRN   100 mg at 07/27/23 0936    thiamine (B-1) injection 200 mg  200 mg Intramuscular Daily Kehrer, Meredith Lea, MD   200 mg at 09/26/22 1143    thiamine (B-1) injection 200 mg  200 mg Intramuscular Daily Kehrer, Meredith Lea, MD   200 mg at 03/15/23 1426       No opioid medication identified on active medication list. I have reviewed chart for other potential  high risk medication/s and harmful drug interactions in the elderly.        Aspirin is on active medication list. Aspirin use is indicated based on review of current medical condition/s. Pros and cons of this therapy have been discussed today. Benefits of this medication outweigh potential harm.  Patient has been encouraged to continue taking this medication.  .      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  ACP discussion was held with the patient during this visit. Patient has an advance directive (not in EMR), copy requested.     Objective    Vitals:    10/24/23 0951   BP: 124/70   Pulse: 74   Temp: 98.6 °F (37 °C)   SpO2: 98%   Weight: 96.4 kg (212 lb 9.6 oz)   Height: 161.3 cm (63.5\")     Estimated body mass index is 37.07 kg/m² as calculated from the following:    Height " "as of this encounter: 161.3 cm (63.5\").    Weight as of this encounter: 96.4 kg (212 lb 9.6 oz).    Class 2 Severe Obesity (BMI >=35 and <=39.9). Obesity-related health conditions include the following: hypertension and dyslipidemias. Obesity is unchanged. BMI is is above average; BMI management plan is completed. We discussed portion control and increasing exercise.      Does the patient have evidence of cognitive impairment? No          HEALTH RISK ASSESSMENT    Smoking Status:  Social History     Tobacco Use   Smoking Status Never   Smokeless Tobacco Never     Alcohol Consumption:  Social History     Substance and Sexual Activity   Alcohol Use Yes    Alcohol/week: 1.0 standard drink of alcohol    Types: 1 Glasses of wine per week    Comment: rare use/some caffeine use     Fall Risk Screen:    KIARA Fall Risk Assessment was completed, and patient is at MODERATE risk for falls. Assessment completed on:10/24/2023    Depression Screening:      10/24/2023     9:49 AM   PHQ-2/PHQ-9 Depression Screening   Little Interest or Pleasure in Doing Things 0-->not at all   Feeling Down, Depressed or Hopeless 0-->not at all   PHQ-9: Brief Depression Severity Measure Score 0       Health Habits and Functional and Cognitive Screening:      10/24/2023     9:49 AM   Functional & Cognitive Status   Do you have difficulty preparing food and eating? No   Do you have difficulty bathing yourself, getting dressed or grooming yourself? No   Do you have difficulty using the toilet? No   Do you have difficulty moving around from place to place? No   Do you have trouble with steps or getting out of a bed or a chair? Yes   Current Diet Well Balanced Diet   Dental Exam Up to date   Eye Exam Up to date   Exercise (times per week) 3 times per week   Current Exercises Include Yard Work;Walking;House Cleaning   Do you need help using the phone?  No   Are you deaf or do you have serious difficulty hearing?  Yes   Do you need help to go to places " out of walking distance? Yes   Do you need help shopping? No   Do you need help preparing meals?  No   Do you need help with housework?  No   Do you need help with laundry? No   Do you need help taking your medications? No   Do you need help managing money? No   Do you ever drive or ride in a car without wearing a seat belt? No       Age-appropriate Screening Schedule:  Refer to the list below for future screening recommendations based on patient's age, sex and/or medical conditions. Orders for these recommended tests are listed in the plan section. The patient has been provided with a written plan.    Health Maintenance   Topic Date Due    COVID-19 Vaccine (6 - 2023-24 season) 09/01/2023    ANNUAL WELLNESS VISIT  09/26/2023    LIPID PANEL  09/26/2023    DXA SCAN  10/28/2025 (Originally 10/28/2022)    MAMMOGRAM  12/20/2023    BMI FOLLOWUP  10/24/2024    COLORECTAL CANCER SCREENING  11/22/2027    TDAP/TD VACCINES (2 - Td or Tdap) 09/17/2031    HEPATITIS C SCREENING  Completed    INFLUENZA VACCINE  Completed    Pneumococcal Vaccine 65+  Completed    ZOSTER VACCINE  Completed    PAP SMEAR  Discontinued                  CMS Preventative Services Quick Reference  Risk Factors Identified During Encounter  Fall Risk-High or Moderate: Discussed Fall Prevention in the home  The above risks/problems have been discussed with the patient.  Pertinent information has been shared with the patient in the After Visit Summary.  An After Visit Summary and PPPS were made available to the patient.    Follow Up:   Next Medicare Wellness visit to be scheduled in 1 year.       Additional E&M Note during same encounter follows:  Patient has multiple medical problems which are significant and separately identifiable that require additional work above and beyond the Medicare Wellness Visit.      Chief Complaint  Medicare Wellness-subsequent, Depression, Hypertension, Hyperlipidemia, and Hypothyroidism    Subjective        HPI  Octavia ROONEY  "Keerthi is also being seen today for follow-up on hypertension, dyslipidemia, hypothyroidism, depression, and multiple vitamin deficiency status post bariatric surgery.  She saw Dr. Simpson last week in my absence for a bad sinus infection.She is finally doing much better.Her cough has improved.She is using her inhalers regularly.She denies any acute complaints today.         Objective   Vital Signs:  /70   Pulse 74   Temp 98.6 °F (37 °C)   Ht 161.3 cm (63.5\")   Wt 96.4 kg (212 lb 9.6 oz)   SpO2 98%   BMI 37.07 kg/m²     Physical Exam  Vitals and nursing note reviewed.   Constitutional:       General: She is not in acute distress.     Appearance: Normal appearance. She is well-developed. She is obese.   HENT:      Head: Normocephalic and atraumatic.      Right Ear: Tympanic membrane, ear canal and external ear normal.      Left Ear: Tympanic membrane, ear canal and external ear normal.      Nose: Nose normal.      Mouth/Throat:      Mouth: Mucous membranes are moist.      Pharynx: Oropharynx is clear. No oropharyngeal exudate or posterior oropharyngeal erythema.   Eyes:      Conjunctiva/sclera: Conjunctivae normal.      Pupils: Pupils are equal, round, and reactive to light.   Neck:      Thyroid: No thyromegaly.   Cardiovascular:      Rate and Rhythm: Normal rate and regular rhythm.      Heart sounds: No murmur heard.  Pulmonary:      Effort: Pulmonary effort is normal.      Breath sounds: Decreased breath sounds present. No wheezing, rhonchi or rales.      Comments: Coughing during exam but no auditory wheezes  Abdominal:      General: Abdomen is flat. Bowel sounds are normal. There is no distension.      Palpations: Abdomen is soft. There is no mass.      Tenderness: There is no abdominal tenderness.      Hernia: No hernia is present.   Musculoskeletal:         General: No swelling. Normal range of motion.      Cervical back: Normal range of motion and neck supple.      Right lower leg: No edema.      " Left lower leg: No edema.   Lymphadenopathy:      Cervical: No cervical adenopathy.   Skin:     General: Skin is warm and dry.      Capillary Refill: Capillary refill takes less than 2 seconds.      Findings: No rash.   Neurological:      General: No focal deficit present.      Mental Status: She is alert and oriented to person, place, and time.      Cranial Nerves: No cranial nerve deficit.   Psychiatric:         Mood and Affect: Mood normal.         Behavior: Behavior normal.                    Progress Notes by Benny Simpson MD (10/19/2023 13:00)      Assessment and Plan   Diagnoses and all orders for this visit:    1. Medicare annual wellness visit, subsequent (Primary)    2. Essential hypertension  -     Lipid Panel  -     CBC & Differential  -     Comprehensive Metabolic Panel  -     TSH    3. Hypothyroidism, unspecified type  -     TSH  -     Vitamin D,25-Hydroxy    4. S/P gastric bypass  -     Vitamin B12  -     Vitamin D,25-Hydroxy  -     Vitamin B1, Whole Blood  -     Folate  -     Ferritin  -     Iron    5. Recurrent major depressive disorder, in partial remission    6. Hyperlipidemia, unspecified hyperlipidemia type  -     Lipid Panel  -     Comprehensive Metabolic Panel    7. Gastroesophageal reflux disease without esophagitis  -     Vitamin B12    8. Vitamin B12 deficiency    9. Exacerbation of persistent asthma, unspecified asthma severity    10. At moderate risk for fall    11. Severe persistent asthma, uncomplicated    12. Visit for screening mammogram  -     Mammo Screening Digital Tomosynthesis Bilateral With CAD; Future    13. Other vitamin B12 deficiency anemia  -     Ferritin  -     Iron    14. Vitamin D deficiency, unspecified  -     Vitamin D,25-Hydroxy    Control of chronic medical problems-much improved mood from earlier this year.  We will go ahead and check routine labs and follow-up in 6 months.         Follow Up   Return in about 6 months (around 4/24/2024) for Recheck HTN.  Patient  was given instructions and counseling regarding her condition or for health maintenance advice. Please see specific information pulled into the AVS if appropriate.

## 2023-10-28 LAB
25(OH)D3+25(OH)D2 SERPL-MCNC: 53 NG/ML (ref 30–100)
ALBUMIN SERPL-MCNC: 4.4 G/DL (ref 3.8–4.8)
ALBUMIN/GLOB SERPL: 1.7 {RATIO} (ref 1.2–2.2)
ALP SERPL-CCNC: 97 IU/L (ref 44–121)
ALT SERPL-CCNC: 20 IU/L (ref 0–32)
AST SERPL-CCNC: 17 IU/L (ref 0–40)
BASOPHILS # BLD AUTO: 0 X10E3/UL (ref 0–0.2)
BASOPHILS NFR BLD AUTO: 0 %
BILIRUB SERPL-MCNC: 0.4 MG/DL (ref 0–1.2)
BUN SERPL-MCNC: 22 MG/DL (ref 8–27)
BUN/CREAT SERPL: 24 (ref 12–28)
CALCIUM SERPL-MCNC: 9.8 MG/DL (ref 8.7–10.3)
CHLORIDE SERPL-SCNC: 103 MMOL/L (ref 96–106)
CHOLEST SERPL-MCNC: 212 MG/DL (ref 100–199)
CO2 SERPL-SCNC: 26 MMOL/L (ref 20–29)
CREAT SERPL-MCNC: 0.91 MG/DL (ref 0.57–1)
EGFRCR SERPLBLD CKD-EPI 2021: 67 ML/MIN/1.73
EOSINOPHIL # BLD AUTO: 0 X10E3/UL (ref 0–0.4)
EOSINOPHIL NFR BLD AUTO: 0 %
ERYTHROCYTE [DISTWIDTH] IN BLOOD BY AUTOMATED COUNT: 13 % (ref 11.7–15.4)
FERRITIN SERPL-MCNC: 98 NG/ML (ref 15–150)
FOLATE SERPL-MCNC: >20 NG/ML
GLOBULIN SER CALC-MCNC: 2.6 G/DL (ref 1.5–4.5)
GLUCOSE SERPL-MCNC: 101 MG/DL (ref 70–99)
HCT VFR BLD AUTO: 43.1 % (ref 34–46.6)
HDLC SERPL-MCNC: 95 MG/DL
HGB BLD-MCNC: 14.5 G/DL (ref 11.1–15.9)
IMM GRANULOCYTES # BLD AUTO: 0.1 X10E3/UL (ref 0–0.1)
IMM GRANULOCYTES NFR BLD AUTO: 1 %
IRON SERPL-MCNC: 114 UG/DL (ref 27–139)
LDLC SERPL CALC-MCNC: 96 MG/DL (ref 0–99)
LYMPHOCYTES # BLD AUTO: 2.9 X10E3/UL (ref 0.7–3.1)
LYMPHOCYTES NFR BLD AUTO: 27 %
MCH RBC QN AUTO: 29.2 PG (ref 26.6–33)
MCHC RBC AUTO-ENTMCNC: 33.6 G/DL (ref 31.5–35.7)
MCV RBC AUTO: 87 FL (ref 79–97)
MONOCYTES # BLD AUTO: 1.1 X10E3/UL (ref 0.1–0.9)
MONOCYTES NFR BLD AUTO: 11 %
NEUTROPHILS # BLD AUTO: 6.4 X10E3/UL (ref 1.4–7)
NEUTROPHILS NFR BLD AUTO: 61 %
PLATELET # BLD AUTO: 327 X10E3/UL (ref 150–450)
POTASSIUM SERPL-SCNC: 4.5 MMOL/L (ref 3.5–5.2)
PROT SERPL-MCNC: 7 G/DL (ref 6–8.5)
RBC # BLD AUTO: 4.97 X10E6/UL (ref 3.77–5.28)
SODIUM SERPL-SCNC: 142 MMOL/L (ref 134–144)
TRIGL SERPL-MCNC: 127 MG/DL (ref 0–149)
TSH SERPL DL<=0.005 MIU/L-ACNC: 0.3 UIU/ML (ref 0.45–4.5)
VIT B1 BLD-SCNC: 178.7 NMOL/L (ref 66.5–200)
VIT B12 SERPL-MCNC: 671 PG/ML (ref 232–1245)
VLDLC SERPL CALC-MCNC: 21 MG/DL (ref 5–40)
WBC # BLD AUTO: 10.6 X10E3/UL (ref 3.4–10.8)

## 2023-10-30 ENCOUNTER — OFFICE VISIT (OUTPATIENT)
Age: 73
End: 2023-10-30
Payer: MEDICARE

## 2023-10-30 VITALS
HEART RATE: 77 BPM | WEIGHT: 215 LBS | HEIGHT: 64 IN | DIASTOLIC BLOOD PRESSURE: 90 MMHG | BODY MASS INDEX: 36.7 KG/M2 | SYSTOLIC BLOOD PRESSURE: 132 MMHG

## 2023-10-30 DIAGNOSIS — Q24.5: Primary | ICD-10-CM

## 2023-10-30 DIAGNOSIS — E03.9 HYPOTHYROIDISM, UNSPECIFIED TYPE: ICD-10-CM

## 2023-10-30 DIAGNOSIS — R07.9 CHEST PAIN, UNSPECIFIED TYPE: ICD-10-CM

## 2023-10-30 PROCEDURE — 1159F MED LIST DOCD IN RCRD: CPT | Performed by: INTERNAL MEDICINE

## 2023-10-30 PROCEDURE — 1160F RVW MEDS BY RX/DR IN RCRD: CPT | Performed by: INTERNAL MEDICINE

## 2023-10-30 PROCEDURE — 99214 OFFICE O/P EST MOD 30 MIN: CPT | Performed by: INTERNAL MEDICINE

## 2023-10-30 PROCEDURE — 93000 ELECTROCARDIOGRAM COMPLETE: CPT | Performed by: INTERNAL MEDICINE

## 2023-10-30 RX ORDER — LEVOTHYROXINE SODIUM 0.05 MG/1
TABLET ORAL
Start: 2023-10-30

## 2023-10-30 NOTE — PROGRESS NOTES
[facility] Follow Up Office Note     Encounter Date:10/30/23  Patient:Octavia Pendleton  :1950  MRN:[id number]      Chief Complaint:   Chief Complaint   Patient presents with    1 year follow-up     History of Presenting Illness:      Ms. Pendleton is 73 y.o. 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  Since her last appointment she has been doing okay she occasionally gets episodes of sharp chest pain located in the center of the chest.  They occur randomly.  Usually last for a few minutes and resolve on their own no associated symptoms    Review of Systems:  Review of Systems   Constitutional: Negative.   HENT: Negative.     Eyes: Negative.    Cardiovascular:  Positive for chest pain.   Respiratory: Negative.     Endocrine: Negative.    Hematologic/Lymphatic: Negative.    Skin: Negative.    Musculoskeletal: Negative.    Gastrointestinal:  Positive for heartburn.   Genitourinary: Negative.    Neurological: Negative.    Psychiatric/Behavioral: Negative.     Allergic/Immunologic: Negative.        Current Outpatient Medications on File Prior to Visit   Medication Sig Dispense Refill    albuterol (PROVENTIL HFA;VENTOLIN HFA) 108 (90 BASE) MCG/ACT inhaler Inhale 2 puffs Every 4 (Four) Hours As Needed for Wheezing.      aspirin 81 MG chewable tablet Chew 1 tablet Daily.      azelastine (ASTELIN) 0.1 % nasal spray 1 spray into the nostril(s) as directed by provider 2 (Two) Times a Day. Use in each nostril as directed 30 mL 2    azelastine (OPTIVAR) 0.05 % ophthalmic solution Administer 1 drop to both eyes 2 (Two) Times a Day. 6 each 3    cyanocobalamin 1000 MCG/ML injection Inject 1 mL under the skin into the appropriate area as directed Every 30 (Thirty) Days. 3 mL 3    EPINEPHrine (EPIPEN) 0.3 MG/0.3ML solution  auto-injector injection       esomeprazole (nexIUM) 40 MG capsule TAKE ONE CAPSULE BY MOUTH TWICE A  capsule 3    ferrous sulfate 325 (65 FE) MG tablet Take 1 tablet by mouth Daily With Breakfast. 90 tablet 3    fexofenadine (ALLEGRA) 180 MG tablet Take 1 tablet by mouth Daily.      fluticasone (FLONASE) 50 MCG/ACT nasal spray 2 sprays into the nostril(s) as directed by provider Daily. Administer 2 sprays in each nostril for each dose.      Fluticasone Furoate-Vilanterol (BREO ELLIPTA IN) Inhale 2 puffs Daily.      hydroCHLOROthiazide (HYDRODIURIL) 12.5 MG tablet TAKE ONE TABLET BY MOUTH DAILY 90 tablet 3    hydrOXYzine (ATARAX) 10 MG tablet TAKE ONE TABLET BY MOUTH THREE TIMES A DAY AS NEEDED FOR ANXIETY 90 tablet 1    levalbuterol (XOPENEX) 0.63 MG/3ML nebulizer solution Take 1 ampule by nebulization Every 4 (Four) Hours As Needed for Wheezing.      metaxalone (Skelaxin) 800 MG tablet Take 0.5-1 tablets by mouth 3 (Three) Times a Day As Needed for Muscle Spasms. 60 tablet 0    omalizumab (XOLAIR) 150 MG injection Inject 1.2 mL under the skin into the appropriate area as directed Every 30 (Thirty) Days.      potassium chloride (KLOR-CON) 20 MEQ CR tablet Take 1 tablet by mouth 2 (Two) Times a Day. 180 tablet 3    Probiotic Product (FLORAJEN3) capsule Take 1 tablet by mouth daily.      thiamine (B-1) 100 MG/ML injection Inject 2 mL into the appropriate muscle as directed by prescriber Every 30 (Thirty) Days. 6 mL 3    Vortioxetine HBr (Trintellix) 10 MG tablet tablet TAKE ONE TABLET BY MOUTH DAILY WITH BREAKFAST 90 tablet 0    [DISCONTINUED] azithromycin (Zithromax Z-Pee) 250 MG tablet Take 2 tablets by mouth on day 1, then 1 tablet daily on days 2-5 6 tablet 0    [DISCONTINUED] levothyroxine (SYNTHROID, LEVOTHROID) 50 MCG tablet TAKE ONE TABLET BY MOUTH DAILY 90 tablet 3    [DISCONTINUED] Pfizer COVID-19 Vac Bivalent 30 MCG/0.3ML suspension       [DISCONTINUED] predniSONE (DELTASONE) 10 MG (21) dose pack Use  as directed on package 1 each 0     Current Facility-Administered Medications on File Prior to Visit   Medication Dose Route Frequency Provider Last Rate Last Admin    cyanocobalamin injection 1,000 mcg  1,000 mcg Intramuscular Q28 Days Kehrer, Meredith Lea, MD   1,000 mcg at 03/16/21 1515    thiamine (B-1) injection 100 mg  100 mg Intramuscular Daily Deny Izquierdo APRN   100 mg at 07/27/23 0936    thiamine (B-1) injection 200 mg  200 mg Intramuscular Daily Kehrer, Meredith Lea, MD   200 mg at 09/26/22 1143    thiamine (B-1) injection 200 mg  200 mg Intramuscular Daily Kehrer, Meredith Lea, MD   200 mg at 03/15/23 1426       Allergies   Allergen Reactions    Codeine Hallucinations    Adhesive Tape Rash    Tape Rash       Past Medical History:   Diagnosis Date    Abnormal EEG     Acute upper respiratory infection     Allergic conjunctivitis     Allergic rhinitis     Anemia     Anesthesia complication     HARD TO AWAKEN    Arthritis     Asthma     Atopic dermatitis     BMI 40.0-44.9, adult     Bronchitis     Cataract     Cervical neuritis     Cervical stenosis of spine     Cervicalgia     Chronic obstructive asthma with acute exacerbation     DDD (degenerative disc disease), lumbar     Depression     Disease of thyroid gland     Dizzinesses     Dysphagia     RESOLVED AFTER SURG    Erosive esophagitis     Facet arthritis of cervical region     Generalized osteoarthritis of multiple sites     GERD (gastroesophageal reflux disease)     Hemangioma     History of esophageal stricture     HL (hearing loss)     Hypercholesteremia     Hyperlipidemia     Hypertension     Hypokalemia     Hypothyroidism     Influenza A with respiratory manifestations     Internal hemorrhoids     Intestinal malabsorption     Low back pain     Mixed hyperlipidemia     Multiple joint complaints     Need for prophylactic vaccination against Streptococcus pneumoniae (pneumococcus) and influenza     Neoplasm of uncertain behavior of skin     JAY on  CPAP     NEW SLEEP TESTDONE SEPT, TRIAL OF NOT WEARING CPAP    Osteopenia of lumbar spine     Osteopenia of spine     Osteoporosis     Pain in arm     Panniculitis     PONV (postoperative nausea and vomiting)     Screening for malignant neoplasm of breast     Screening for malignant neoplasm of cervix     Sleep apnea     SOB (shortness of breath)     Stroke     Thiamine deficiency     TIA (transient ischemic attack)     Vitamin B deficiency     Vitamin B12 deficiency     Vitamin D deficiency     Wears glasses        Past Surgical History:   Procedure Laterality Date    ABDOMINOPLASTY N/A 11/01/2019    Procedure: ABDOMINOPLASTY;  Surgeon: Waterhouse, Maurine, MD;  Location: [facility] MAIN OR;  Service: Plastics    BARIATRIC SURGERY      CARDIAC CATHETERIZATION  10/2009    Abnormal blood vessels    CARPAL TUNNEL RELEASE Left     CERVICAL DISC SURGERY  04/21/2016    bones spurs removed as well    CERVICAL SPINE SURGERY  04/20/2017    CHOLECYSTECTOMY      CLOSED REDUCTION WRIST FRACTURE  09/02/2021    COLONOSCOPY      COLONOSCOPY N/A 11/22/2022    Procedure: COLONOSCOPY with polypectomy;  Surgeon: Agustín Rolon MD;  Location: [facility] [place] OR;  Service: Gastroenterology;  Laterality: N/A;  hemorrhoids, polyp    DIAGNOSTIC LAPAROSCOPY      ELBOW PROCEDURE Left     release of nerve similar to carpal tunnel    ENDOSCOPY N/A 11/22/2022    Procedure: ESOPHAGOGASTRODUODENOSCOPY with dilation;  Surgeon: Agustín Rolon MD;  Location: [facility] [place] OR;  Service: Gastroenterology;  Laterality: N/A;  gastric bypass, hiatal hernia, schatzki's ring, dilated to 20mm    EPIDURAL BLOCK      every 3 months.     EYE SURGERY Left 2000    CYST, CATARACT    FRACTURE SURGERY      GASTRIC BYPASS  10/2010        HAND SURGERY Right     HEMORRHOIDECTOMY      HYSTERECTOMY      INGUINAL HERNIA REPAIR Bilateral     JOINT REPLACEMENT      OOPHORECTOMY      PANNICULECTOMY N/A 11/01/2019    Procedure: PANNICULECTOMY;  Surgeon: Waterhouse,  "MD Una;  Location: [facility] OR;  Service: Plastics    REPLACEMENT TOTAL KNEE BILATERAL      RIB FRACTURE SURGERY      1ST RIB REMOVED, DUE NUMBNESS IN ARM    ROTATOR CUFF REPAIR Bilateral     SPINE SURGERY         Social History     Socioeconomic History    Marital status:    Tobacco Use    Smoking status: Never    Smokeless tobacco: Never   Vaping Use    Vaping Use: Never used   Substance and Sexual Activity    Alcohol use: Yes     Alcohol/week: 1.0 standard drink of alcohol     Types: 1 Glasses of wine per week     Comment: rare use/some caffeine use    Drug use: No    Sexual activity: Not Currently     Partners: Male     Birth control/protection: Hysterectomy       Family History   Problem Relation Age of Onset    Arthritis Mother     Diabetes Mother     Heart disease Mother         Congestive heart failure    Vision loss Mother     Vasculitis Father         Cerebral    Coronary artery disease Father     Early death Father     Hearing loss Father     Heart disease Father     Cancer Sister     Diabetes Sister     Hypertension Sister     Diabetes Sister     Diabetes Brother     Diabetes Brother     Breast cancer Paternal Aunt     Diabetes Other     Malig Hyperthermia Neg Hx     Colon cancer Neg Hx     Colon polyps Neg Hx     Crohn's disease Neg Hx     Irritable bowel syndrome Neg Hx     Ulcerative colitis Neg Hx        The following portions of the patient's history were reviewed and updated as appropriate: allergies, current medications, past family history, past medical history, past social history, past surgical history and problem list.       Objective:       Vitals:    10/30/23 1102   BP: 132/90   BP Location: Right arm   Patient Position: Sitting   Pulse: 77   Weight: 97.5 kg (215 lb)   Height: 161.3 cm (63.5\")       Body mass index is 37.49 kg/m².    Physical Exam:  Constitutional: Well appearing, Well-developed, No acute distress   HENT: Oropharynx clear and membrane moist  Eyes: Normal " "conjunctiva, no sclera icterus.  Neck: Supple, no carotid bruit bilaterally.  Cardiovascular: Regular rate and rhythm, No Murmur, No bilateral lower extremity edema.  Pulmonary: Normal respiratory effort, normal lung sounds, no wheezing.  Neurological: Alert and orient x 3.   Skin: Warm, dry, no ecchymosis, no rash.  Psych: Appropriate mood and affect. Normal judgment and insight.       Lab Results   Component Value Date     10/24/2023     03/15/2023    K 4.5 10/24/2023    K 4.4 03/15/2023     10/24/2023     03/15/2023    CO2 26 10/24/2023    CO2 23 03/15/2023    BUN 22 10/24/2023    BUN 22 03/15/2023    CREATININE 0.91 10/24/2023    CREATININE 0.81 03/15/2023    EGFRIFNONA 65 02/08/2022    EGFRIFNONA 67 09/17/2021    EGFRIFAFRI 74 02/08/2022    EGFRIFAFRI 81 09/17/2021    GLUCOSE 101 (H) 10/24/2023    GLUCOSE 115 (H) 03/15/2023    CALCIUM 9.8 10/24/2023    CALCIUM 9.2 03/15/2023    PROTENTOTREF 7.0 10/24/2023    PROTENTOTREF 6.6 03/15/2023    ALBUMIN 4.4 10/24/2023    ALBUMIN 4.0 03/15/2023    BILITOT 0.4 10/24/2023    BILITOT <0.2 03/15/2023    AST 17 10/24/2023    AST 20 03/15/2023    ALT 20 10/24/2023    ALT 16 03/15/2023     Lab Results   Component Value Date    WBC 10.6 10/24/2023    WBC 9.7 03/15/2023    HGB 14.5 10/24/2023    HGB 13.3 03/15/2023    HCT 43.1 10/24/2023    HCT 40.2 03/15/2023    MCV 87 10/24/2023    MCV 88 03/15/2023     10/24/2023     03/15/2023     Lab Results   Component Value Date    TRIG 127 10/24/2023    TRIG 121 09/26/2022    HDL 95 10/24/2023    HDL 89 (H) 09/26/2022    LDL 96 10/24/2023     (H) 09/26/2022     No results found for: \"PROBNP\", \"BNP\"  Lab Results   Component Value Date    CKTOTAL 106 03/16/2021     Lab Results   Component Value Date    TSH 0.297 (L) 10/24/2023    TSH 0.354 (L) 03/15/2023         ECG 12 Lead    Date/Time: 10/30/2023 11:26 AM  Performed by: Drew Thompson MD    Authorized by: Drew Thompson MD  " Comparison: compared with previous ECG from 10/27/2022  Similar to previous ECG  Rhythm: sinus rhythm      Cardiac CTA 10/20/2009 [facility]:  Anomalous left main coronary artery with origin from separate ostium of the right coronary cusp traversing and a retroaortic path and does not have an interarterial course.  No significant coronary artery disease noted throughout the coronary arteries.            Assessment:          Diagnosis Plan   1. 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 Chest pain, unspecified type  ECG 12 Lead    Adult Transthoracic Echo Complete W/ Cont if Necessary Per Protocol               Plan:       Ms. Pendleton is 73 y.o. 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  Overall patient is doing okay but she is having some symptoms of chest pain.  Sounds more a either muscular skeletal or pericardial in nature.  Less likely be angina not particularly exertional.  I would like to for start with an echocardiogram and see if there is any abnormalities there.  She might benefit from a short course of ibuprofen but given all of her issues with GERD would hold off until we get our echocardiogram and only do a short course if needed.      Chest pain:  Atypical sharp pain  Could be pericardial we will get echocardiogram to assess    Anomalous left main coronary artery:  Patient has not left main arising from the right coronary cusp taking a posterior course and not intra-arterial and thus a benign course  Does not increase cardiovascular risk      Follow-up:  6 months    Thank you for allowing me to participate in the care of Octavia Pendleton. Feel free to contact me  directly with any further questions or concerns.    Drew Thompson MD  [facility]  10/30/23  11:38 EDT

## 2023-11-07 ENCOUNTER — HOSPITAL ENCOUNTER (OUTPATIENT)
Dept: MAMMOGRAPHY | Facility: HOSPITAL | Age: 73
Discharge: HOME | End: 2023-11-07
Admitting: FAMILY MEDICINE
Payer: MEDICARE

## 2023-11-07 DIAGNOSIS — Z12.31 VISIT FOR SCREENING MAMMOGRAM: ICD-10-CM

## 2023-11-07 PROCEDURE — 77067 SCR MAMMO BI INCL CAD: CPT

## 2023-11-07 PROCEDURE — 77063 BREAST TOMOSYNTHESIS BI: CPT

## 2023-11-13 ENCOUNTER — HOSPITAL ENCOUNTER (OUTPATIENT)
Dept: CARDIOLOGY | Facility: HOSPITAL | Age: 73
Discharge: HOME | End: 2023-11-13
Admitting: INTERNAL MEDICINE
Payer: MEDICARE

## 2023-11-13 VITALS
SYSTOLIC BLOOD PRESSURE: 139 MMHG | DIASTOLIC BLOOD PRESSURE: 74 MMHG | HEART RATE: 74 BPM | WEIGHT: 215 LBS | BODY MASS INDEX: 38.09 KG/M2 | HEIGHT: 63 IN

## 2023-11-13 DIAGNOSIS — R07.9 CHEST PAIN, UNSPECIFIED TYPE: ICD-10-CM

## 2023-11-13 LAB
AORTIC ARCH: 2.9 CM
AORTIC DIMENSIONLESS INDEX: 0.8 (DI)
ASCENDING AORTA: 3.1 CM
BH CV ECHO MEAS - ACS: 2.02 CM
BH CV ECHO MEAS - AO MAX PG: 7 MMHG
BH CV ECHO MEAS - AO MEAN PG: 4 MMHG
BH CV ECHO MEAS - AO ROOT DIAM: 3.4 CM
BH CV ECHO MEAS - AO V2 MAX: 132 CM/SEC
BH CV ECHO MEAS - AO V2 VTI: 28.4 CM
BH CV ECHO MEAS - AVA(I,D): 2.7 CM2
BH CV ECHO MEAS - EDV(CUBED): 85.2 ML
BH CV ECHO MEAS - EDV(MOD-SP2): 101 ML
BH CV ECHO MEAS - EDV(MOD-SP4): 84 ML
BH CV ECHO MEAS - EF(MOD-BP): 59.6 %
BH CV ECHO MEAS - EF(MOD-SP2): 64.4 %
BH CV ECHO MEAS - EF(MOD-SP4): 54.8 %
BH CV ECHO MEAS - ESV(CUBED): 34.2 ML
BH CV ECHO MEAS - ESV(MOD-SP2): 36 ML
BH CV ECHO MEAS - ESV(MOD-SP4): 38 ML
BH CV ECHO MEAS - FS: 26.2 %
BH CV ECHO MEAS - IVS/LVPW: 1 CM
BH CV ECHO MEAS - IVSD: 0.8 CM
BH CV ECHO MEAS - LAT PEAK E' VEL: 8.7 CM/SEC
BH CV ECHO MEAS - LV DIASTOLIC VOL/BSA (35-75): 42.1 CM2
BH CV ECHO MEAS - LV MASS(C)D: 109.4 GRAMS
BH CV ECHO MEAS - LV MAX PG: 4.4 MMHG
BH CV ECHO MEAS - LV MEAN PG: 2 MMHG
BH CV ECHO MEAS - LV SYSTOLIC VOL/BSA (12-30): 19.1 CM2
BH CV ECHO MEAS - LV V1 MAX: 105 CM/SEC
BH CV ECHO MEAS - LV V1 VTI: 23.2 CM
BH CV ECHO MEAS - LVIDD: 4.4 CM
BH CV ECHO MEAS - LVIDS: 3.2 CM
BH CV ECHO MEAS - LVOT AREA: 3.3 CM2
BH CV ECHO MEAS - LVOT DIAM: 2.04 CM
BH CV ECHO MEAS - LVPWD: 0.8 CM
BH CV ECHO MEAS - MED PEAK E' VEL: 8.5 CM/SEC
BH CV ECHO MEAS - MR MAX PG: 25.8 MMHG
BH CV ECHO MEAS - MR MAX VEL: 253.8 CM/SEC
BH CV ECHO MEAS - MV A DUR: 0.12 SEC
BH CV ECHO MEAS - MV A MAX VEL: 79.5 CM/SEC
BH CV ECHO MEAS - MV DEC SLOPE: 604.7 CM/SEC2
BH CV ECHO MEAS - MV DEC TIME: 0.19 SEC
BH CV ECHO MEAS - MV E MAX VEL: 98.8 CM/SEC
BH CV ECHO MEAS - MV E/A: 1.24
BH CV ECHO MEAS - MV MAX PG: 4.8 MMHG
BH CV ECHO MEAS - MV MEAN PG: 1.96 MMHG
BH CV ECHO MEAS - MV P1/2T: 53.3 MSEC
BH CV ECHO MEAS - MV V2 VTI: 28.1 CM
BH CV ECHO MEAS - MVA(P1/2T): 4.1 CM2
BH CV ECHO MEAS - MVA(VTI): 2.7 CM2
BH CV ECHO MEAS - PA ACC TIME: 0.1 SEC
BH CV ECHO MEAS - PA V2 MAX: 103.2 CM/SEC
BH CV ECHO MEAS - PULM A REVS DUR: 0.14 SEC
BH CV ECHO MEAS - PULM A REVS VEL: 42.7 CM/SEC
BH CV ECHO MEAS - PULM DIAS VEL: 43.7 CM/SEC
BH CV ECHO MEAS - PULM S/D: 1.12
BH CV ECHO MEAS - PULM SYS VEL: 49 CM/SEC
BH CV ECHO MEAS - QP/QS: 0.57
BH CV ECHO MEAS - RAP SYSTOLE: 3 MMHG
BH CV ECHO MEAS - RV MAX PG: 1.5 MMHG
BH CV ECHO MEAS - RV V1 MAX: 61.3 CM/SEC
BH CV ECHO MEAS - RV V1 VTI: 14.2 CM
BH CV ECHO MEAS - RVOT DIAM: 1.98 CM
BH CV ECHO MEAS - RVSP: 34.9 MMHG
BH CV ECHO MEAS - SI(MOD-SP2): 32.6 ML/M2
BH CV ECHO MEAS - SI(MOD-SP4): 23.1 ML/M2
BH CV ECHO MEAS - SUP REN AO DIAM: 2 CM
BH CV ECHO MEAS - SV(LVOT): 76.2 ML
BH CV ECHO MEAS - SV(MOD-SP2): 65 ML
BH CV ECHO MEAS - SV(MOD-SP4): 46 ML
BH CV ECHO MEAS - SV(RVOT): 43.5 ML
BH CV ECHO MEAS - TAPSE (>1.6): 2.22 CM
BH CV ECHO MEAS - TR MAX PG: 31.9 MMHG
BH CV ECHO MEAS - TR MAX VEL: 282.3 CM/SEC
BH CV ECHO MEASUREMENTS AVERAGE E/E' RATIO: 11.49
BH CV XLRA - RV BASE: 3.3 CM
BH CV XLRA - RV LENGTH: 6.7 CM
BH CV XLRA - RV MID: 3.4 CM
BH CV XLRA - TDI S': 10.6 CM/SEC
SINUS: 3 CM
STJ: 2.5 CM

## 2023-11-13 PROCEDURE — 25510000001 PERFLUTREN (DEFINITY) 8.476 MG IN SODIUM CHLORIDE (PF) 0.9 % 10 ML INJECTION: Performed by: INTERNAL MEDICINE

## 2023-11-13 PROCEDURE — 93306 TTE W/DOPPLER COMPLETE: CPT

## 2023-11-13 PROCEDURE — 93306 TTE W/DOPPLER COMPLETE: CPT | Performed by: INTERNAL MEDICINE

## 2023-11-13 RX ADMIN — PERFLUTREN 2 ML: 6.52 INJECTION, SUSPENSION INTRAVENOUS at 08:46

## 2023-11-16 ENCOUNTER — INFUSION (OUTPATIENT)
Dept: ONCOLOGY | Facility: HOSPITAL | Age: 73
End: 2023-11-16
Payer: MEDICARE

## 2023-11-16 VITALS
HEART RATE: 74 BPM | DIASTOLIC BLOOD PRESSURE: 79 MMHG | TEMPERATURE: 96.7 F | HEIGHT: 64 IN | SYSTOLIC BLOOD PRESSURE: 129 MMHG | RESPIRATION RATE: 15 BRPM | BODY MASS INDEX: 36.43 KG/M2 | OXYGEN SATURATION: 93 % | WEIGHT: 213.4 LBS

## 2023-11-16 DIAGNOSIS — J45.40 MODERATE PERSISTENT ASTHMA, UNSPECIFIED WHETHER COMPLICATED: Primary | ICD-10-CM

## 2023-11-16 PROCEDURE — 25010000002 OMALIZUMAB 150 MG/ML SOLUTION PREFILLED SYRINGE: Performed by: ALLERGY & IMMUNOLOGY

## 2023-11-16 PROCEDURE — 96372 THER/PROPH/DIAG INJ SC/IM: CPT

## 2023-11-16 RX ADMIN — OMALIZUMAB 150 MG: 150 INJECTION, SOLUTION SUBCUTANEOUS at 09:02

## 2023-11-16 NOTE — NURSING NOTE
Patient tolerated xolair injection without difficulty and declined post 30 minute wait. Instructed to call her prescribing MD for any concerns prior to next appt. Pt v/u. Discharged per ambulation.

## 2023-11-20 DIAGNOSIS — Z98.84 S/P GASTRIC BYPASS: ICD-10-CM

## 2023-11-20 RX ORDER — POTASSIUM CHLORIDE 1500 MG/1
20 TABLET, EXTENDED RELEASE ORAL 2 TIMES DAILY
Qty: 180 TABLET | Refills: 3 | Status: SHIPPED | OUTPATIENT
Start: 2023-11-20

## 2023-12-14 ENCOUNTER — INFUSION (OUTPATIENT)
Dept: ONCOLOGY | Facility: HOSPITAL | Age: 73
End: 2023-12-14
Payer: MEDICARE

## 2023-12-14 VITALS
TEMPERATURE: 98.6 F | BODY MASS INDEX: 37.22 KG/M2 | HEIGHT: 64 IN | SYSTOLIC BLOOD PRESSURE: 138 MMHG | RESPIRATION RATE: 15 BRPM | DIASTOLIC BLOOD PRESSURE: 85 MMHG | OXYGEN SATURATION: 94 % | WEIGHT: 218 LBS | HEART RATE: 74 BPM

## 2023-12-14 DIAGNOSIS — J45.40 MODERATE PERSISTENT ASTHMA, UNSPECIFIED WHETHER COMPLICATED: Primary | ICD-10-CM

## 2023-12-14 PROCEDURE — 25010000002 OMALIZUMAB 150 MG/ML SOLUTION PREFILLED SYRINGE: Performed by: ALLERGY & IMMUNOLOGY

## 2023-12-14 PROCEDURE — 96372 THER/PROPH/DIAG INJ SC/IM: CPT

## 2023-12-14 RX ADMIN — OMALIZUMAB 150 MG: 150 INJECTION, SOLUTION SUBCUTANEOUS at 09:08

## 2023-12-14 NOTE — NURSING NOTE
Injection  Xolair Injection performed in [place] by Cathy Leone RN. Patient tolerated the procedure well without complications.  12/14/23   Cathy Leone RN

## 2023-12-21 ENCOUNTER — OFFICE VISIT (OUTPATIENT)
Dept: GASTROENTEROLOGY | Facility: CLINIC | Age: 73
End: 2023-12-21
Payer: MEDICARE

## 2023-12-21 VITALS
SYSTOLIC BLOOD PRESSURE: 146 MMHG | WEIGHT: 217 LBS | TEMPERATURE: 98.4 F | HEIGHT: 64 IN | BODY MASS INDEX: 37.05 KG/M2 | DIASTOLIC BLOOD PRESSURE: 92 MMHG | OXYGEN SATURATION: 96 % | HEART RATE: 92 BPM

## 2023-12-21 DIAGNOSIS — Z12.11 COLON CANCER SCREENING: ICD-10-CM

## 2023-12-21 DIAGNOSIS — K21.9 GASTROESOPHAGEAL REFLUX DISEASE, UNSPECIFIED WHETHER ESOPHAGITIS PRESENT: Primary | Chronic | ICD-10-CM

## 2023-12-21 DIAGNOSIS — K22.2 SCHATZKI'S RING: ICD-10-CM

## 2023-12-21 DIAGNOSIS — Z86.010 HX OF ADENOMATOUS COLONIC POLYPS: ICD-10-CM

## 2023-12-21 DIAGNOSIS — Z98.84 S/P GASTRIC BYPASS: ICD-10-CM

## 2023-12-21 DIAGNOSIS — K64.9 HEMORRHOIDS, UNSPECIFIED HEMORRHOID TYPE: Chronic | ICD-10-CM

## 2023-12-21 DIAGNOSIS — K44.9 HIATAL HERNIA: Chronic | ICD-10-CM

## 2023-12-21 DIAGNOSIS — K59.00 CONSTIPATION, UNSPECIFIED CONSTIPATION TYPE: Chronic | ICD-10-CM

## 2023-12-21 NOTE — PROGRESS NOTES
"Chief Complaint   Patient presents with    Follow-up    Constipation    Heartburn         History of Present Illness  73-year-old female presents the office today for follow-up.  She was last seen in office on 12/21/2022.  She is a history of GERD, gastric bypass surgery in 2008, 2 cm hiatal hernia.  Previous Schatzki's ring that was traversed and dilated, internal hemorrhoids, adenomatous colon polyps, and anemia.  Last EGD and colonoscopy were performed 11/22/2022.  Patient was recommended to undergo next surveillance colonoscopy at a 5-year interval which will be due November 2027 due to her history of adenomatous colon polyps.    For GERD she continues esomeprazole 40 mg twice daily with good control of symptoms.  Occasionally she will have flares of her symptoms. She is cautious with foods. Caffeine can trigger symptoms.  Dysphagia is greatly improved with esophageal dilation.    She takes a daily probiotic and ferrous sulfate.  Occasionally she will have some constipation and takes Metamucil daily. She feels that \"things get blocked\". She can go up to 3-4 days without a BM. If she has not had a BM in 4-5 days she will use a hemorrhoid suppository. She does not take MiraLax. Intermittently she will have problems with her hemorrhoids and uses Preparation H suppositories on an as-needed basis. She had hemorrhoid surgery many years ago. She denies any rectal bleeding or abdominal pain.     Result Review :       Office Visit with Gwendolyn Silvestre APRN (12/21/2022)   UPPER GI ENDOSCOPY (11/22/2022 08:24)   COLONOSCOPY (11/22/2022 08:23)   Tissue Pathology Exam (11/22/2022 08:50)   Vital Signs:   /92   Pulse 92   Temp 98.4 °F (36.9 °C)   Ht 161.3 cm (63.5\")   Wt 98.4 kg (217 lb)   SpO2 96%   BMI 37.84 kg/m²     Body mass index is 37.84 kg/m².     Physical Exam  Vitals reviewed.   Constitutional:       Appearance: Normal appearance.   Pulmonary:      Effort: Pulmonary effort is normal. No respiratory " distress.   Abdominal:      General: Abdomen is flat. Bowel sounds are normal. There is no distension.      Palpations: Abdomen is soft. There is no mass.      Tenderness: There is no abdominal tenderness. There is no guarding.   Musculoskeletal:         General: Normal range of motion.   Skin:     General: Skin is warm and dry.   Neurological:      General: No focal deficit present.      Mental Status: She is alert and oriented to person, place, and time.   Psychiatric:         Mood and Affect: Mood normal.         Thought Content: Thought content normal.         Judgment: Judgment normal.       Assessment and Plan    Diagnoses and all orders for this visit:    1. Gastroesophageal reflux disease, unspecified whether esophagitis present (Primary)    2. Hiatal hernia    3. Constipation, unspecified constipation type    4. Hemorrhoids, unspecified hemorrhoid type    5. Schatzki's ring    6. Hx of adenomatous colonic polyps    7. Colon cancer screening    8. S/P gastric bypass           Patient Instructions   For GERD, continue esomeprazole 40 mg twice daily.     2.  For GERD, we recommend avoiding eating 3-4 hours before bedtime, eating smaller more frequent meals, and avoiding any known food triggers including spicy foods, tomatoes and tomato-based sauces, chocolate, coffee/tea, citrus fruits, carbonated  beverages and alcohol.     3.  For management of constipation we recommend daily use of MiraLax-generic is also fine. We recommend starting with 1/2 -1 capful per day and you may adjust your dosing based upon bowel habit response.     4. Continue daily Metamucil.     5.  You may continue to use Preparation H suppositories for intermittent flare of hemorrhoids.     6.  Next colonoscopy due November 2027 for colon cancer screening due to your history of colon polyps.     7.  Plan for annual office follow up for reassessment of symptoms or sooner should symptoms worsen or fail to improve.       Discussion:  Patient is  doing well with management of GERD with twice a PPI therapy which is provided by her primary care physician we will have her continue this regimen and continue to implement antireflux precautions.    Most problematic is her constipation.  She can go up to 4 to 5 days between bowel movements.  She does take an oral iron supplement.  We have recommended that she continue her daily Metamucil and add MiraLAX into her regimen on a daily basis starting off with 1/2-1 capful daily and may adjust her dosing based upon how her bowel habits respond.  She may continue to use Preparation H suppositories intermittently for flare of her hemorrhoids.  Next colonoscopy November 2027 for colon cancer screening due to her history of colon polyps.  Recommend annual office follow-up for reassessment of symptoms or sooner should her symptoms worsen or fail to improve.  Patient verbalized understanding of above plan of care and is in agreement.  All questions answered and support provided.    EMR Dragon/Transcription Disclaimer:  This document has been Dictated utilizing Dragon dictation.

## 2023-12-21 NOTE — PATIENT INSTRUCTIONS
For GERD, continue esomeprazole 40 mg twice daily.     2.  For GERD, we recommend avoiding eating 3-4 hours before bedtime, eating smaller more frequent meals, and avoiding any known food triggers including spicy foods, tomatoes and tomato-based sauces, chocolate, coffee/tea, citrus fruits, carbonated  beverages and alcohol.     3.  For management of constipation we recommend daily use of MiraLax-generic is also fine. We recommend starting with 1/2 -1 capful per day and you may adjust your dosing based upon bowel habit response.     4. Continue daily Metamucil.     5.  You may continue to use Preparation H suppositories for intermittent flare of hemorrhoids.     6.  Next colonoscopy due November 2027 for colon cancer screening due to your history of colon polyps.     7.  Plan for annual office follow up for reassessment of symptoms or sooner should symptoms worsen or fail to improve.

## 2023-12-26 DIAGNOSIS — F33.41 RECURRENT MAJOR DEPRESSIVE DISORDER, IN PARTIAL REMISSION: ICD-10-CM

## 2024-01-11 ENCOUNTER — INFUSION (OUTPATIENT)
Dept: ONCOLOGY | Facility: HOSPITAL | Age: 74
End: 2024-01-11
Payer: MEDICARE

## 2024-01-11 VITALS — DIASTOLIC BLOOD PRESSURE: 78 MMHG | SYSTOLIC BLOOD PRESSURE: 143 MMHG | TEMPERATURE: 98 F | HEART RATE: 77 BPM

## 2024-01-11 DIAGNOSIS — J45.40 MODERATE PERSISTENT ASTHMA, UNSPECIFIED WHETHER COMPLICATED: Primary | ICD-10-CM

## 2024-01-11 PROCEDURE — 25010000002 OMALIZUMAB 150 MG/ML SOLUTION PREFILLED SYRINGE: Performed by: ALLERGY & IMMUNOLOGY

## 2024-01-11 PROCEDURE — 96372 THER/PROPH/DIAG INJ SC/IM: CPT

## 2024-01-11 RX ADMIN — OMALIZUMAB 150 MG: 150 INJECTION, SOLUTION SUBCUTANEOUS at 09:02

## 2024-01-11 NOTE — NURSING NOTE
Patient tolerated xolair injections without difficulty; declined 30 minute post injection observation. Instructed to call her prescribing MD for any concerns prior to next appt. Discharged in stable condition.

## 2024-02-01 ENCOUNTER — OFFICE VISIT (OUTPATIENT)
Dept: FAMILY MEDICINE CLINIC | Facility: CLINIC | Age: 74
End: 2024-02-01
Payer: MEDICARE

## 2024-02-01 VITALS
OXYGEN SATURATION: 97 % | HEART RATE: 82 BPM | BODY MASS INDEX: 38.14 KG/M2 | SYSTOLIC BLOOD PRESSURE: 146 MMHG | TEMPERATURE: 96.6 F | WEIGHT: 223.4 LBS | DIASTOLIC BLOOD PRESSURE: 82 MMHG | HEIGHT: 64 IN

## 2024-02-01 DIAGNOSIS — I10 ESSENTIAL HYPERTENSION: Primary | ICD-10-CM

## 2024-02-01 PROCEDURE — 99213 OFFICE O/P EST LOW 20 MIN: CPT | Performed by: FAMILY MEDICINE

## 2024-02-01 RX ORDER — VALSARTAN 80 MG/1
80 TABLET ORAL DAILY
Qty: 30 TABLET | Refills: 1 | Status: SHIPPED | OUTPATIENT
Start: 2024-02-01

## 2024-02-01 NOTE — PROGRESS NOTES
"Chief Complaint  Hypertension and Headache    Subjective        Octavia Pendleton presents to [facility]  History of Present Illness  BP running very high this week.  Got so high yesterday she felt bad with headache and pulsing in her head.   Got her daughter's BP cuff and started checking it.  Head feels better now.  Felt nervous and shaky in her hands.  Has had some tingling in her left shin as well.  BP at home 152-183/, HR .  No chest pain or change in her breathing.         Objective   Vital Signs:  /82   Pulse 82   Temp 96.6 °F (35.9 °C) (Temporal)   Ht 161.3 cm (63.5\")   Wt 101 kg (223 lb 6.4 oz)   SpO2 97%   BMI 38.95 kg/m²   Estimated body mass index is 38.95 kg/m² as calculated from the following:    Height as of this encounter: 161.3 cm (63.5\").    Weight as of this encounter: 101 kg (223 lb 6.4 oz).               Physical Exam  Constitutional:       General: She is not in acute distress.     Appearance: Normal appearance. She is well-developed. She is obese.   HENT:      Head: Normocephalic and atraumatic.      Right Ear: Tympanic membrane, ear canal and external ear normal.      Left Ear: Tympanic membrane, ear canal and external ear normal.      Mouth/Throat:      Mouth: Mucous membranes are moist.      Pharynx: Oropharynx is clear.   Eyes:      Conjunctiva/sclera: Conjunctivae normal.      Pupils: Pupils are equal, round, and reactive to light.   Neck:      Thyroid: No thyromegaly.   Cardiovascular:      Rate and Rhythm: Normal rate and regular rhythm.      Heart sounds: No murmur heard.  Pulmonary:      Effort: Pulmonary effort is normal.      Breath sounds: Normal breath sounds. No wheezing.   Musculoskeletal:         General: Normal range of motion.      Cervical back: Neck supple.   Lymphadenopathy:      Cervical: No cervical adenopathy.   Skin:     General: Skin is warm and dry.   Neurological:      Mental Status: She is alert and oriented to " person, place, and time.   Psychiatric:         Mood and Affect: Mood normal.         Behavior: Behavior normal.        Result Review :                     Assessment and Plan     Diagnoses and all orders for this visit:    1. Essential hypertension (Primary)  -     Basic Metabolic Panel  -     valsartan (Diovan) 80 MG tablet; Take 1 tablet by mouth Daily.  Dispense: 30 tablet; Refill: 1    Hypertension-not to goal, add in valsartan and check a BMP and follow-up with me in about a month.  Let me know if symptoms worsen instead of improve.         Follow Up     Return in about 1 month (around 3/1/2024) for Recheck HTN.  Patient was given instructions and counseling regarding her condition or for health maintenance advice. Please see specific information pulled into the AVS if appropriate.

## 2024-02-02 LAB
BUN SERPL-MCNC: 15 MG/DL (ref 8–27)
BUN/CREAT SERPL: 17 (ref 12–28)
CALCIUM SERPL-MCNC: 9.5 MG/DL (ref 8.7–10.3)
CHLORIDE SERPL-SCNC: 101 MMOL/L (ref 96–106)
CO2 SERPL-SCNC: 25 MMOL/L (ref 20–29)
CREAT SERPL-MCNC: 0.89 MG/DL (ref 0.57–1)
EGFRCR SERPLBLD CKD-EPI 2021: 69 ML/MIN/1.73
GLUCOSE SERPL-MCNC: 111 MG/DL (ref 70–99)
POTASSIUM SERPL-SCNC: 4.9 MMOL/L (ref 3.5–5.2)
SODIUM SERPL-SCNC: 141 MMOL/L (ref 134–144)

## 2024-02-08 ENCOUNTER — INFUSION (OUTPATIENT)
Dept: ONCOLOGY | Facility: HOSPITAL | Age: 74
End: 2024-02-08
Payer: MEDICARE

## 2024-02-08 DIAGNOSIS — J45.40 MODERATE PERSISTENT ASTHMA, UNSPECIFIED WHETHER COMPLICATED: Primary | ICD-10-CM

## 2024-02-08 PROCEDURE — 96372 THER/PROPH/DIAG INJ SC/IM: CPT

## 2024-02-08 PROCEDURE — 25010000002 OMALIZUMAB 150 MG/ML SOLUTION PREFILLED SYRINGE: Performed by: ALLERGY & IMMUNOLOGY

## 2024-02-08 RX ADMIN — OMALIZUMAB 150 MG: 150 INJECTION, SOLUTION SUBCUTANEOUS at 08:43

## 2024-02-08 NOTE — NURSING NOTE
Patient tolerated xolair injections without difficulty and declined to be monitored for 30 minutes post injections. Instructed to call her prescribing MD for any concerns prior to next appt. Discharged per ambulation in a stable condition.

## 2024-02-25 DIAGNOSIS — K21.9 GASTROESOPHAGEAL REFLUX DISEASE WITHOUT ESOPHAGITIS: Chronic | ICD-10-CM

## 2024-02-26 RX ORDER — ESOMEPRAZOLE MAGNESIUM 40 MG/1
CAPSULE, DELAYED RELEASE ORAL
Qty: 180 CAPSULE | Refills: 1 | Status: SHIPPED | OUTPATIENT
Start: 2024-02-26

## 2024-03-04 ENCOUNTER — TELEPHONE (OUTPATIENT)
Dept: FAMILY MEDICINE CLINIC | Facility: CLINIC | Age: 74
End: 2024-03-04

## 2024-03-04 ENCOUNTER — OFFICE VISIT (OUTPATIENT)
Dept: FAMILY MEDICINE CLINIC | Facility: CLINIC | Age: 74
End: 2024-03-04
Payer: MEDICARE

## 2024-03-04 VITALS
WEIGHT: 221.3 LBS | HEART RATE: 85 BPM | BODY MASS INDEX: 37.78 KG/M2 | SYSTOLIC BLOOD PRESSURE: 134 MMHG | DIASTOLIC BLOOD PRESSURE: 88 MMHG | TEMPERATURE: 94.8 F | OXYGEN SATURATION: 94 % | HEIGHT: 64 IN

## 2024-03-04 DIAGNOSIS — F33.41 RECURRENT MAJOR DEPRESSIVE DISORDER, IN PARTIAL REMISSION: ICD-10-CM

## 2024-03-04 DIAGNOSIS — I10 ESSENTIAL HYPERTENSION: Primary | ICD-10-CM

## 2024-03-04 PROCEDURE — 99213 OFFICE O/P EST LOW 20 MIN: CPT | Performed by: FAMILY MEDICINE

## 2024-03-04 RX ORDER — VALSARTAN 160 MG/1
160 TABLET ORAL DAILY
Qty: 90 TABLET | Refills: 0 | Status: SHIPPED | OUTPATIENT
Start: 2024-03-04

## 2024-03-04 RX ORDER — HYDROXYZINE HYDROCHLORIDE 10 MG/1
10 TABLET, FILM COATED ORAL 3 TIMES DAILY PRN
Qty: 270 TABLET | Refills: 1 | Status: SHIPPED | OUTPATIENT
Start: 2024-03-04

## 2024-03-04 NOTE — PROGRESS NOTES
"Chief Complaint  Hypertension    Subjective        Octavia Pendleton presents to [facility]  History of Present Illness  Has been doing better with her blood pressure.  Denies any side effects.  Compliant with her other medications.  Taking anxiety meds morning and late afternoon.     Hypertension  The current episode started more than 1 month ago. The problem has been improved since onset. Associated symptoms include headaches and malaise/fatigue. Agents associated with hypertension include thyroid hormones. Risk factors for coronary artery disease include dyslipidemia, family history, obesity and post-menopausal state.       Objective   Vital Signs:  /88 (BP Location: Right arm)   Pulse 85   Temp 94.8 °F (34.9 °C) (Temporal)   Ht 161.6 cm (63.62\")   Wt 100 kg (221 lb 4.8 oz)   SpO2 94%   BMI 38.44 kg/m²   Estimated body mass index is 38.44 kg/m² as calculated from the following:    Height as of this encounter: 161.6 cm (63.62\").    Weight as of this encounter: 100 kg (221 lb 4.8 oz).               Physical Exam  Constitutional:       General: She is not in acute distress.     Appearance: Normal appearance. She is well-developed. She is obese.   HENT:      Head: Normocephalic and atraumatic.      Right Ear: Tympanic membrane, ear canal and external ear normal.      Left Ear: Tympanic membrane, ear canal and external ear normal.      Mouth/Throat:      Mouth: Mucous membranes are moist.      Pharynx: Oropharynx is clear.   Eyes:      Conjunctiva/sclera: Conjunctivae normal.      Pupils: Pupils are equal, round, and reactive to light.   Neck:      Thyroid: No thyromegaly.   Cardiovascular:      Rate and Rhythm: Normal rate and regular rhythm.      Heart sounds: No murmur heard.  Pulmonary:      Effort: Pulmonary effort is normal.      Breath sounds: Normal breath sounds. No wheezing.   Musculoskeletal:         General: Normal range of motion.      Cervical back: Neck supple. "   Lymphadenopathy:      Cervical: No cervical adenopathy.   Skin:     General: Skin is warm and dry.   Neurological:      Mental Status: She is alert and oriented to person, place, and time.   Psychiatric:         Mood and Affect: Mood normal.         Behavior: Behavior normal.        Result Review :                     Assessment and Plan     Diagnoses and all orders for this visit:    1. Essential hypertension (Primary)  -     valsartan (Diovan) 160 MG tablet; Take 1 tablet by mouth Daily.  Dispense: 90 tablet; Refill: 0    2. Recurrent major depressive disorder, in partial remission  -     hydrOXYzine (ATARAX) 10 MG tablet; Take 1 tablet by mouth 3 (Three) Times a Day As Needed for Anxiety.  Dispense: 270 tablet; Refill: 1    Hypertension-improved control, not quite to goal, increase valsartan to 160 and keep routine follow-up         Follow Up     Return for Recheck HTN, Next scheduled follow up.  Patient was given instructions and counseling regarding her condition or for health maintenance advice. Please see specific information pulled into the AVS if appropriate.         Answers submitted by the patient for this visit:  Primary Reason for Visit (Submitted on 2/26/2024)  What is the primary reason for your visit?: High Blood Pressure

## 2024-03-07 ENCOUNTER — INFUSION (OUTPATIENT)
Dept: ONCOLOGY | Facility: HOSPITAL | Age: 74
End: 2024-03-07
Payer: MEDICARE

## 2024-03-07 ENCOUNTER — CLINICAL SUPPORT (OUTPATIENT)
Dept: FAMILY MEDICINE CLINIC | Facility: CLINIC | Age: 74
End: 2024-03-07
Payer: MEDICARE

## 2024-03-07 DIAGNOSIS — E51.9 THIAMINE DEFICIENCY: Primary | ICD-10-CM

## 2024-03-07 DIAGNOSIS — J45.40 MODERATE PERSISTENT ASTHMA, UNSPECIFIED WHETHER COMPLICATED: Primary | ICD-10-CM

## 2024-03-07 PROCEDURE — 96372 THER/PROPH/DIAG INJ SC/IM: CPT

## 2024-03-07 PROCEDURE — 25010000002 OMALIZUMAB 150 MG/ML SOLUTION PREFILLED SYRINGE: Performed by: ALLERGY & IMMUNOLOGY

## 2024-03-07 RX ADMIN — OMALIZUMAB 150 MG: 150 INJECTION, SOLUTION SUBCUTANEOUS at 08:57

## 2024-03-07 RX ADMIN — THIAMINE HYDROCHLORIDE 100 MG: 100 INJECTION, SOLUTION INTRAMUSCULAR; INTRAVENOUS at 10:20

## 2024-03-07 NOTE — NURSING NOTE
Patient tolerated xolair injections without difficulty,  with no S/S of reaction. Instructed to call her prescribing MD for any concerns prior to next appt. Discharged per ambulation with VSS.

## 2024-03-22 DIAGNOSIS — F33.41 RECURRENT MAJOR DEPRESSIVE DISORDER, IN PARTIAL REMISSION: ICD-10-CM

## 2024-03-22 RX ORDER — VORTIOXETINE 10 MG/1
10 TABLET, FILM COATED ORAL
Qty: 90 TABLET | Refills: 0 | Status: SHIPPED | OUTPATIENT
Start: 2024-03-22

## 2024-03-27 ENCOUNTER — OFFICE VISIT (OUTPATIENT)
Dept: FAMILY MEDICINE CLINIC | Facility: CLINIC | Age: 74
End: 2024-03-27
Payer: MEDICARE

## 2024-03-27 VITALS
HEIGHT: 64 IN | TEMPERATURE: 98.5 F | OXYGEN SATURATION: 94 % | BODY MASS INDEX: 37.63 KG/M2 | WEIGHT: 220.4 LBS | HEART RATE: 90 BPM | SYSTOLIC BLOOD PRESSURE: 132 MMHG | DIASTOLIC BLOOD PRESSURE: 82 MMHG

## 2024-03-27 DIAGNOSIS — J06.9 ACUTE URI: Primary | ICD-10-CM

## 2024-03-27 DIAGNOSIS — J45.901 EXACERBATION OF PERSISTENT ASTHMA, UNSPECIFIED ASTHMA SEVERITY: ICD-10-CM

## 2024-03-27 LAB
EXPIRATION DATE: NORMAL
EXPIRATION DATE: NORMAL
FLUAV AG NPH QL: NEGATIVE
FLUBV AG NPH QL: NEGATIVE
INTERNAL CONTROL: NORMAL
INTERNAL CONTROL: NORMAL
Lab: NORMAL
Lab: NORMAL
SARS-COV-2 AG UPPER RESP QL IA.RAPID: NOT DETECTED

## 2024-03-27 RX ORDER — IPRATROPIUM BROMIDE 42 UG/1
2 SPRAY, METERED NASAL 4 TIMES DAILY PRN
Qty: 15 ML | Refills: 0 | Status: SHIPPED | OUTPATIENT
Start: 2024-03-27

## 2024-03-27 RX ORDER — PREDNISONE 20 MG/1
20 TABLET ORAL 2 TIMES DAILY
Qty: 10 TABLET | Refills: 0 | Status: SHIPPED | OUTPATIENT
Start: 2024-03-27 | End: 2024-04-01

## 2024-03-27 RX ORDER — DEXTROMETHORPHAN HYDROBROMIDE AND PROMETHAZINE HYDROCHLORIDE 15; 6.25 MG/5ML; MG/5ML
5 SYRUP ORAL 4 TIMES DAILY PRN
Qty: 180 ML | Refills: 0 | Status: SHIPPED | OUTPATIENT
Start: 2024-03-27

## 2024-03-27 RX ORDER — DOXYCYCLINE HYCLATE 100 MG/1
100 CAPSULE ORAL 2 TIMES DAILY
Qty: 14 CAPSULE | Refills: 0 | Status: SHIPPED | OUTPATIENT
Start: 2024-03-27 | End: 2024-04-03

## 2024-03-27 NOTE — PROGRESS NOTES
"Chief Complaint  Cough, Nasal Congestion, Sore Throat, Headache, Earache, and chest burning     Subjective        Octavia Pendleton presents to [facility]  History of Present Illness  History of Present Illness  The patient presents for evaluation of multiple medical concerns.    Her sinuses are a mess, her throat is raw, her chest is burning, she gets to cough and it feels like her head is going to explode, and her arms, hands and fingers just vibrate and tingle. Her symptoms started on Monday afternoon. Her first symptom was a sore throat. She denies being around anybody sick. She had a mild fever. She has not tried any over-the-counter medications. She has not used her nebulizer. She has used her inhaler, which has not helped much. It made her lightheaded. Her neck feels sore to the touch. She felt warm yesterday, but when she checked her temperature, it did not seem to be that bad, but she feels really hot right now. Her grandson has COVID-19 currently, but she has not been around him.   She is not allergic to any antibiotics.       Objective   Vital Signs:  /82   Pulse 90   Temp 98.5 °F (36.9 °C) (Temporal)   Ht 161.6 cm (63.62\")   Wt 100 kg (220 lb 6.4 oz)   SpO2 94%   BMI 38.28 kg/m²   Estimated body mass index is 38.28 kg/m² as calculated from the following:    Height as of this encounter: 161.6 cm (63.62\").    Weight as of this encounter: 100 kg (220 lb 6.4 oz).               Physical Exam  Constitutional:       General: She is not in acute distress.     Appearance: Normal appearance. She is well-developed. She is obese.   HENT:      Head: Normocephalic and atraumatic.      Right Ear: Tympanic membrane, ear canal and external ear normal.      Left Ear: Tympanic membrane, ear canal and external ear normal.      Nose: Congestion and rhinorrhea present.      Mouth/Throat:      Mouth: Mucous membranes are moist.      Pharynx: Oropharynx is clear. Posterior " oropharyngeal erythema present. No oropharyngeal exudate.   Eyes:      Conjunctiva/sclera: Conjunctivae normal.      Pupils: Pupils are equal, round, and reactive to light.   Neck:      Thyroid: No thyromegaly.   Cardiovascular:      Rate and Rhythm: Normal rate and regular rhythm.      Heart sounds: No murmur heard.  Pulmonary:      Effort: Pulmonary effort is normal.      Breath sounds: Wheezing and rhonchi present. No rales.   Musculoskeletal:         General: Normal range of motion.      Cervical back: Neck supple.   Lymphadenopathy:      Cervical: Cervical adenopathy present.   Skin:     General: Skin is warm and dry.   Neurological:      Mental Status: She is alert and oriented to person, place, and time.   Psychiatric:         Mood and Affect: Mood normal.         Behavior: Behavior normal.        Physical Exam  The back of her throat is red.  Fatty lymph nodes.  Mild wheezing and congestion.       Result Review :          Results  Laboratory Studies  Labs were reviewed with the patient.              Assessment and Plan     Diagnoses and all orders for this visit:    1. Acute URI (Primary)  -     POCT SARS-CoV-2 Antigen JENNY  -     POC Influenza A / B  -     promethazine-dextromethorphan (PROMETHAZINE-DM) 6.25-15 MG/5ML syrup; Take 5 mL by mouth 4 (Four) Times a Day As Needed for Cough.  Dispense: 180 mL; Refill: 0  -     ipratropium (ATROVENT) 0.06 % nasal spray; 2 sprays into the nostril(s) as directed by provider 4 (Four) Times a Day As Needed for Rhinitis.  Dispense: 15 mL; Refill: 0    2. Exacerbation of persistent asthma, unspecified asthma severity  -     predniSONE (DELTASONE) 20 MG tablet; Take 1 tablet by mouth 2 (Two) Times a Day for 5 days.  Dispense: 10 tablet; Refill: 0  -     doxycycline (VIBRAMYCIN) 100 MG capsule; Take 1 capsule by mouth 2 (Two) Times a Day for 7 days.  Dispense: 14 capsule; Refill: 0      Assessment & Plan  URI-push fluids and rest, symptomatic treatment with Atrovent and  (DM  Asthma exacerbation-because of the congestion, we will go ahead and do antibiotics but also 5-day burst of prednisone, use nebulizer every 4 hours while awake for the next couple days then as needed            Follow Up     No follow-ups on file.  Patient was given instructions and counseling regarding her condition or for health maintenance advice. Please see specific information pulled into the AVS if appropriate.    Patient or patient representative verbalized consent for the use of Ambient Listening during the visit with  Meredith Lea Kehrer, MD for chart documentation. 3/27/2024  15:53 EDT

## 2024-04-04 ENCOUNTER — INFUSION (OUTPATIENT)
Dept: ONCOLOGY | Facility: HOSPITAL | Age: 74
End: 2024-04-04
Payer: MEDICARE

## 2024-04-04 DIAGNOSIS — J45.40 MODERATE PERSISTENT ASTHMA, UNSPECIFIED WHETHER COMPLICATED: Primary | ICD-10-CM

## 2024-04-04 PROCEDURE — 25010000002 OMALIZUMAB 150 MG/ML SOLUTION PREFILLED SYRINGE: Performed by: ALLERGY & IMMUNOLOGY

## 2024-04-04 PROCEDURE — 96372 THER/PROPH/DIAG INJ SC/IM: CPT

## 2024-04-04 RX ADMIN — OMALIZUMAB 150 MG: 150 INJECTION, SOLUTION SUBCUTANEOUS at 11:13

## 2024-04-24 ENCOUNTER — OFFICE VISIT (OUTPATIENT)
Dept: FAMILY MEDICINE CLINIC | Facility: CLINIC | Age: 74
End: 2024-04-24
Payer: MEDICARE

## 2024-04-24 VITALS
OXYGEN SATURATION: 95 % | WEIGHT: 222 LBS | HEIGHT: 64 IN | SYSTOLIC BLOOD PRESSURE: 128 MMHG | DIASTOLIC BLOOD PRESSURE: 82 MMHG | TEMPERATURE: 96.9 F | BODY MASS INDEX: 37.9 KG/M2 | HEART RATE: 72 BPM

## 2024-04-24 DIAGNOSIS — G47.33 OSA ON CPAP: ICD-10-CM

## 2024-04-24 DIAGNOSIS — K21.9 GASTROESOPHAGEAL REFLUX DISEASE WITHOUT ESOPHAGITIS: ICD-10-CM

## 2024-04-24 DIAGNOSIS — E03.9 HYPOTHYROIDISM, UNSPECIFIED TYPE: ICD-10-CM

## 2024-04-24 DIAGNOSIS — E51.9 THIAMINE DEFICIENCY: ICD-10-CM

## 2024-04-24 DIAGNOSIS — Z98.84 S/P GASTRIC BYPASS: ICD-10-CM

## 2024-04-24 DIAGNOSIS — I10 ESSENTIAL HYPERTENSION: Primary | ICD-10-CM

## 2024-04-24 DIAGNOSIS — E53.8 VITAMIN B12 DEFICIENCY: ICD-10-CM

## 2024-04-24 DIAGNOSIS — E55.9 VITAMIN D DEFICIENCY: ICD-10-CM

## 2024-04-24 DIAGNOSIS — F33.41 RECURRENT MAJOR DEPRESSIVE DISORDER, IN PARTIAL REMISSION: ICD-10-CM

## 2024-04-24 DIAGNOSIS — E61.1 IRON DEFICIENCY: ICD-10-CM

## 2024-04-24 DIAGNOSIS — J45.40 MODERATE PERSISTENT ASTHMA WITHOUT COMPLICATION: ICD-10-CM

## 2024-04-24 PROCEDURE — 99214 OFFICE O/P EST MOD 30 MIN: CPT | Performed by: FAMILY MEDICINE

## 2024-04-24 RX ORDER — CYANOCOBALAMIN 1000 UG/ML
1000 INJECTION, SOLUTION INTRAMUSCULAR; SUBCUTANEOUS
Qty: 3 ML | Refills: 3 | Status: SHIPPED | OUTPATIENT
Start: 2024-04-24

## 2024-04-24 RX ORDER — THIAMINE HYDROCHLORIDE 100 MG/ML
200 INJECTION, SOLUTION INTRAMUSCULAR; INTRAVENOUS
Qty: 6 ML | Refills: 3 | Status: SHIPPED | OUTPATIENT
Start: 2024-04-24

## 2024-04-24 NOTE — PROGRESS NOTES
Chief Complaint  Hypertension (6 month follow up )    Subjective        Octavia Pendleton presents to [facility]  History of Present Illness  History of Present Illness  The patient is a 73-year-old female here for follow-up of chronic medical problems including hypertension, hypothyroidism, and status post gastric bypass with vitamin deficiencies.    During her previous visit, the patient exhibited an upper respiratory infection and reported persistent ear discomfort. However, her respiratory function has shown significant improvement, and her asthma has significantly improved following an exacerbation. She reports persistent fatigue, the cause of which is unknown to her, and struggles to regain her energy levels. Despite her efforts to increase her physical activity, including outdoor walks and short walks, her energy levels remain low. She is under the care of a sleep specialist for her Continuous Positive Airway Pressure (CPAP) therapy. Despite efforts to reduce her weight to 200 pounds, she experienced a weight gain of 20 pounds within a two-week period last fall, coinciding with a two-week period of medication adherence. She acknowledges the need to reduce her sugar intake, despite not having a craving for sweets. Her diet primarily consists of water, an Atkins drink for breakfast, a light lunch, a second Atkins drink, and unsweet tea and diet Pepsi, which she is attempting to reduce due to carbonated drinks exacerbating her reflux. Her diet is minimal, and she does not consume prepackaged foods.    During her last visit, the patient reported an upper respiratory infection and reported persistent ear discomfort. She reported an improvement in her ear condition, although she continues to experience hearing impairment in her left ear. She describes a constant sensation of heart pounding in her ear.       Objective   Vital Signs:  /82   Pulse 72   Temp 96.9 °F (36.1 °C)  "(Temporal)   Ht 161.6 cm (63.62\")   Wt 101 kg (222 lb)   SpO2 95%   BMI 38.56 kg/m²   Estimated body mass index is 38.56 kg/m² as calculated from the following:    Height as of this encounter: 161.6 cm (63.62\").    Weight as of this encounter: 101 kg (222 lb).               Physical Exam   Physical Exam  The patient is alert, in no acute distress.  Pupils are equal. The oral mucosa is moist and grey in color. Clear fluid is present behind both eardrums.  No purulence or erythema however.  The neck is supple without adenopathy.  Heart is regular.  The lungs are clear.    Vital Signs  The patient's blood pressure is 128/82.       Result Review :          Results                Assessment and Plan     Diagnoses and all orders for this visit:    1. Essential hypertension (Primary)  -     CBC & Differential  -     Comprehensive Metabolic Panel  -     Lipid Panel  -     TSH    2. Hypothyroidism, unspecified type  -     TSH    3. Recurrent major depressive disorder, in partial remission    4. Gastroesophageal reflux disease without esophagitis    5. S/P gastric bypass  -     Vitamin B12  -     Vitamin D,25-Hydroxy  -     Ferritin  -     Iron  -     Vitamin B1, Whole Blood    6. Vitamin B12 deficiency  -     Vitamin B12    7. Thiamine deficiency  -     Vitamin B1, Whole Blood    8. Vitamin D deficiency  -     Vitamin D,25-Hydroxy    9. Moderate persistent asthma without complication    10. JAY on CPAP    11. Iron deficiency  -     Ferritin  -     Iron      Assessment & Plan  1. Hypertension.  The patient's blood pressure is well-regulated.    2. Hypothyroidism.  Due for TSH    3. Status post gastric bypass.  The patient is due for a six-month routine laboratory test.    4. Vitamin deficiencies.  The patient's fatigue is likely a result of chronic hypoxia.            Follow Up     Return in about 6 months (around 10/24/2024) for Medicare Wellness.  Patient was given instructions and counseling regarding her condition or " for health maintenance advice. Please see specific information pulled into the AVS if appropriate.    Patient or patient representative verbalized consent for the use of Ambient Listening during the visit with  Meredith Lea Kehrer, MD for chart documentation. 4/24/2024  09:37 EDT      Answers submitted by the patient for this visit:  Other (Submitted on 4/22/2024)  Please describe your symptoms.: Followup blood pressure, depression, anxiety, thyroid etc.  Have you had these symptoms before?: Yes  How long have you been having these symptoms?: Greater than 2 weeks  Please list any medications you are currently taking for this condition.: See record  Primary Reason for Visit (Submitted on 4/22/2024)  What is the primary reason for your visit?: Other

## 2024-04-27 LAB
25(OH)D3+25(OH)D2 SERPL-MCNC: 37.7 NG/ML (ref 30–100)
ALBUMIN SERPL-MCNC: 4 G/DL (ref 3.8–4.8)
ALBUMIN/GLOB SERPL: 1.8 {RATIO} (ref 1.2–2.2)
ALP SERPL-CCNC: 96 IU/L (ref 44–121)
ALT SERPL-CCNC: 13 IU/L (ref 0–32)
AST SERPL-CCNC: 18 IU/L (ref 0–40)
BASOPHILS # BLD AUTO: 0.1 X10E3/UL (ref 0–0.2)
BASOPHILS NFR BLD AUTO: 1 %
BILIRUB SERPL-MCNC: 0.4 MG/DL (ref 0–1.2)
BUN SERPL-MCNC: 13 MG/DL (ref 8–27)
BUN/CREAT SERPL: 14 (ref 12–28)
CALCIUM SERPL-MCNC: 9.1 MG/DL (ref 8.7–10.3)
CHLORIDE SERPL-SCNC: 103 MMOL/L (ref 96–106)
CHOLEST SERPL-MCNC: 239 MG/DL (ref 100–199)
CO2 SERPL-SCNC: 26 MMOL/L (ref 20–29)
CREAT SERPL-MCNC: 0.95 MG/DL (ref 0.57–1)
EGFRCR SERPLBLD CKD-EPI 2021: 64 ML/MIN/1.73
EOSINOPHIL # BLD AUTO: 0.4 X10E3/UL (ref 0–0.4)
EOSINOPHIL NFR BLD AUTO: 5 %
ERYTHROCYTE [DISTWIDTH] IN BLOOD BY AUTOMATED COUNT: 13.1 % (ref 11.7–15.4)
FERRITIN SERPL-MCNC: 120 NG/ML (ref 15–150)
GLOBULIN SER CALC-MCNC: 2.2 G/DL (ref 1.5–4.5)
GLUCOSE SERPL-MCNC: 86 MG/DL (ref 70–99)
HCT VFR BLD AUTO: 40.8 % (ref 34–46.6)
HDLC SERPL-MCNC: 72 MG/DL
HGB BLD-MCNC: 13.7 G/DL (ref 11.1–15.9)
IMM GRANULOCYTES # BLD AUTO: 0 X10E3/UL (ref 0–0.1)
IMM GRANULOCYTES NFR BLD AUTO: 0 %
IRON SERPL-MCNC: 113 UG/DL (ref 27–139)
LDLC SERPL CALC-MCNC: 141 MG/DL (ref 0–99)
LYMPHOCYTES # BLD AUTO: 2.9 X10E3/UL (ref 0.7–3.1)
LYMPHOCYTES NFR BLD AUTO: 39 %
MCH RBC QN AUTO: 29.3 PG (ref 26.6–33)
MCHC RBC AUTO-ENTMCNC: 33.6 G/DL (ref 31.5–35.7)
MCV RBC AUTO: 87 FL (ref 79–97)
MONOCYTES # BLD AUTO: 1 X10E3/UL (ref 0.1–0.9)
MONOCYTES NFR BLD AUTO: 14 %
NEUTROPHILS # BLD AUTO: 3 X10E3/UL (ref 1.4–7)
NEUTROPHILS NFR BLD AUTO: 41 %
PLATELET # BLD AUTO: 293 X10E3/UL (ref 150–450)
POTASSIUM SERPL-SCNC: 4.3 MMOL/L (ref 3.5–5.2)
PROT SERPL-MCNC: 6.2 G/DL (ref 6–8.5)
RBC # BLD AUTO: 4.68 X10E6/UL (ref 3.77–5.28)
SODIUM SERPL-SCNC: 140 MMOL/L (ref 134–144)
TRIGL SERPL-MCNC: 149 MG/DL (ref 0–149)
TSH SERPL DL<=0.005 MIU/L-ACNC: 1.26 UIU/ML (ref 0.45–4.5)
VIT B1 BLD-SCNC: 114.8 NMOL/L (ref 66.5–200)
VIT B12 SERPL-MCNC: 618 PG/ML (ref 232–1245)
VLDLC SERPL CALC-MCNC: 26 MG/DL (ref 5–40)
WBC # BLD AUTO: 7.3 X10E3/UL (ref 3.4–10.8)

## 2024-05-01 DIAGNOSIS — E78.49 OTHER HYPERLIPIDEMIA: Primary | ICD-10-CM

## 2024-05-01 RX ORDER — ATORVASTATIN CALCIUM 10 MG/1
10 TABLET, FILM COATED ORAL NIGHTLY
Qty: 90 TABLET | Refills: 1 | Status: SHIPPED | OUTPATIENT
Start: 2024-05-01

## 2024-05-02 ENCOUNTER — INFUSION (OUTPATIENT)
Dept: ONCOLOGY | Facility: HOSPITAL | Age: 74
End: 2024-05-02
Payer: MEDICARE

## 2024-05-02 VITALS — RESPIRATION RATE: 20 BRPM | TEMPERATURE: 98.2 F

## 2024-05-02 DIAGNOSIS — J45.40 MODERATE PERSISTENT ASTHMA, UNSPECIFIED WHETHER COMPLICATED: Primary | ICD-10-CM

## 2024-05-02 PROCEDURE — 25010000002 OMALIZUMAB 150 MG/ML SOLUTION PREFILLED SYRINGE: Performed by: ALLERGY & IMMUNOLOGY

## 2024-05-02 PROCEDURE — 96372 THER/PROPH/DIAG INJ SC/IM: CPT

## 2024-05-02 RX ADMIN — OMALIZUMAB 150 MG: 150 INJECTION, SOLUTION SUBCUTANEOUS at 11:00

## 2024-05-02 NOTE — NURSING NOTE
Patient tolerated xolair injections without difficulty, and declined to be monitored for 30 minutes post injections. Instructed to call her prescribing MD for any concerns prior to next appt. Discharged per ambulation in stable condition.

## 2024-05-24 ENCOUNTER — OFFICE VISIT (OUTPATIENT)
Dept: CARDIOLOGY | Facility: CLINIC | Age: 74
End: 2024-05-24
Payer: MEDICARE

## 2024-05-24 VITALS
DIASTOLIC BLOOD PRESSURE: 90 MMHG | HEIGHT: 64 IN | SYSTOLIC BLOOD PRESSURE: 150 MMHG | HEART RATE: 80 BPM | BODY MASS INDEX: 37.9 KG/M2 | OXYGEN SATURATION: 92 % | WEIGHT: 222 LBS

## 2024-05-24 DIAGNOSIS — R06.09 DYSPNEA ON EXERTION: ICD-10-CM

## 2024-05-24 DIAGNOSIS — I10 ESSENTIAL HYPERTENSION: ICD-10-CM

## 2024-05-24 DIAGNOSIS — Q24.5: Primary | ICD-10-CM

## 2024-05-24 DIAGNOSIS — I10 HYPERTENSION, UNSPECIFIED TYPE: ICD-10-CM

## 2024-05-24 PROCEDURE — 1159F MED LIST DOCD IN RCRD: CPT | Performed by: NURSE PRACTITIONER

## 2024-05-24 PROCEDURE — 99214 OFFICE O/P EST MOD 30 MIN: CPT | Performed by: NURSE PRACTITIONER

## 2024-05-24 PROCEDURE — 93000 ELECTROCARDIOGRAM COMPLETE: CPT | Performed by: NURSE PRACTITIONER

## 2024-05-24 PROCEDURE — 1160F RVW MEDS BY RX/DR IN RCRD: CPT | Performed by: NURSE PRACTITIONER

## 2024-05-24 RX ORDER — HYDROCHLOROTHIAZIDE 12.5 MG/1
25 TABLET ORAL DAILY
Start: 2024-05-24

## 2024-05-24 NOTE — PROGRESS NOTES
[facility] Follow Up Office Note     Encounter Date:24  Patient:Octavia Pendleton  :1950  MRN:[id number]      Chief Complaint: No chief complaint on file.        History of Presenting Illness:        Octavia Pendleton is a 73 y.o. female who is here for follow-up.  She is a patient of Dr Thompson.    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      Patient last saw Dr Thompson about 6 months ago. She was having random sharp chest pains that self-resolved.  Echo completed 23 showed normal EF without significant findings.    Today patient reports she continues to have random sharp chest pains.  She also reports dyspnea on exertion, she cannot walk very far without feeling fairly short of breath.  She thinks this is come on progressively.  She is not having chest pain with exertion.  She denies lower extremity edema, PND orthopnea.  She monitors her blood pressure fairly regularly, she does get occasional normal readings but a lot of the time it is moderately elevated.    Review of Systems:  Review of Systems   Constitutional: Positive for malaise/fatigue.   Cardiovascular:  Positive for chest pain and dyspnea on exertion. Negative for leg swelling, orthopnea and palpitations.   Respiratory:  Negative for shortness of breath.        Current Outpatient Medications on File Prior to Visit   Medication Sig Dispense Refill    albuterol (PROVENTIL HFA;VENTOLIN HFA) 108 (90 BASE) MCG/ACT inhaler Inhale 2 puffs Every 4 (Four) Hours As Needed for Wheezing.      aspirin 81 MG chewable tablet Chew 1 tablet Daily.      atorvastatin (LIPITOR) 10 MG tablet Take 1 tablet by mouth Every Night. 90 tablet 1    azelastine (ASTELIN) 0.1 % nasal spray 1 spray into the nostril(s) as directed by provider 2 (Two) Times a Day. Use in each nostril as directed 30 mL 2     azelastine (OPTIVAR) 0.05 % ophthalmic solution Administer 1 drop to both eyes 2 (Two) Times a Day. 6 each 3    cyanocobalamin 1000 MCG/ML injection Inject 1 mL under the skin into the appropriate area as directed Every 30 (Thirty) Days. 3 mL 3    EPINEPHrine (EPIPEN) 0.3 MG/0.3ML solution auto-injector injection       esomeprazole (nexIUM) 40 MG capsule TAKE ONE CAPSULE BY MOUTH TWICE A  capsule 1    ferrous sulfate 325 (65 FE) MG tablet Take 1 tablet by mouth Daily With Breakfast. 90 tablet 3    fexofenadine (ALLEGRA) 180 MG tablet Take 1 tablet by mouth Daily.      fluticasone (FLONASE) 50 MCG/ACT nasal spray 2 sprays into the nostril(s) as directed by provider Daily. Administer 2 sprays in each nostril for each dose.      Fluticasone Furoate-Vilanterol (BREO ELLIPTA IN) Inhale 2 puffs Daily.      hydrOXYzine (ATARAX) 10 MG tablet Take 1 tablet by mouth 3 (Three) Times a Day As Needed for Anxiety. 270 tablet 1    KLOR-CON 20 MEQ CR tablet TAKE ONE TABLET BY MOUTH TWICE A  tablet 3    levalbuterol (XOPENEX) 0.63 MG/3ML nebulizer solution Take 1 ampule by nebulization Every 4 (Four) Hours As Needed for Wheezing.      levothyroxine (SYNTHROID, LEVOTHROID) 50 MCG tablet 50 mcg daily Sunday Tuesday Thursday Saturday, 25 mcg daily Monday Wednesday Friday      metaxalone (Skelaxin) 800 MG tablet Take 0.5-1 tablets by mouth 3 (Three) Times a Day As Needed for Muscle Spasms. 60 tablet 0    omalizumab (XOLAIR) 150 MG injection Inject 1.2 mL under the skin into the appropriate area as directed Every 30 (Thirty) Days.      Probiotic Product (FLORAJEN3) capsule Take 1 tablet by mouth daily.      thiamine (B-1) 100 MG/ML injection Inject 2 mL into the appropriate muscle as directed by prescriber Every 30 (Thirty) Days. 6 mL 3    Trintellix 10 MG tablet tablet TAKE 1 TABLET BY MOUTH DAILY WITH BREAKFAST 90 tablet 0    valsartan (Diovan) 160 MG tablet Take 1 tablet by mouth Daily. 90 tablet 0    [DISCONTINUED]  hydroCHLOROthiazide (HYDRODIURIL) 12.5 MG tablet TAKE ONE TABLET BY MOUTH DAILY 90 tablet 3     Current Facility-Administered Medications on File Prior to Visit   Medication Dose Route Frequency Provider Last Rate Last Admin    cyanocobalamin injection 1,000 mcg  1,000 mcg Intramuscular Q28 Days Kehrer, Meredith Lea, MD   1,000 mcg at 03/16/21 1515    thiamine (B-1) injection 100 mg  100 mg Intramuscular Daily Deny Izquierdo APRN   100 mg at 03/07/24 1020    thiamine (B-1) injection 200 mg  200 mg Intramuscular Daily Kehrer, Meredith Lea, MD   200 mg at 09/26/22 1143    thiamine (B-1) injection 200 mg  200 mg Intramuscular Daily Kehrer, Meredith Lea, MD   200 mg at 03/15/23 1426       Allergies   Allergen Reactions    Codeine Hallucinations    Adhesive Tape Rash    Tape Rash       Past Medical History:   Diagnosis Date    Abnormal EEG     Acute upper respiratory infection     Allergic     Allergic conjunctivitis     Allergic rhinitis     Anemia     Anesthesia complication     HARD TO AWAKEN    Anxiety     Arthritis     Asthma     Atopic dermatitis     BMI 40.0-44.9, adult     Bronchitis     Cataract     Cervical neuritis     Cervical stenosis of spine     Cervicalgia     Cholelithiasis     Chronic obstructive asthma with acute exacerbation     Colon polyp 2022    Removed by surgeon    DDD (degenerative disc disease), lumbar     Depression     Disease of thyroid gland     Dizzinesses     Dysphagia     RESOLVED AFTER SURG    Encounter for screening colonoscopy 09/27/2022    Erosive esophagitis     Facet arthritis of cervical region     Generalized osteoarthritis of multiple sites     GERD (gastroesophageal reflux disease)     Hemangioma     History of esophageal stricture     HL (hearing loss)     Hypercholesteremia     Hyperlipidemia     Hypertension     Hypokalemia     Hypothyroidism     Influenza A with respiratory manifestations     Internal hemorrhoids     Intestinal malabsorption     Low back pain     Mixed  hyperlipidemia     Multiple joint complaints     Need for prophylactic vaccination against Streptococcus pneumoniae (pneumococcus) and influenza     Neoplasm of uncertain behavior of skin     JAY on CPAP     NEW SLEEP TESTDONE SEPT, TRIAL OF NOT WEARING CPAP    Osteopenia of lumbar spine     Osteopenia of spine     Osteoporosis     Pain in arm     Panniculitis     PONV (postoperative nausea and vomiting)     Screening for malignant neoplasm of breast     Screening for malignant neoplasm of cervix     Sleep apnea     SOB (shortness of breath)     Stroke     Thiamine deficiency     TIA (transient ischemic attack)     Vitamin B deficiency     Vitamin B12 deficiency     Vitamin D deficiency     Wears glasses        Past Surgical History:   Procedure Laterality Date    ABDOMINOPLASTY N/A 11/01/2019    Procedure: ABDOMINOPLASTY;  Surgeon: Waterhouse, Maurine, MD;  Location: [facility] MAIN OR;  Service: Plastics    BARIATRIC SURGERY      CARDIAC CATHETERIZATION  10/2009    Abnormal blood vessels    CARPAL TUNNEL RELEASE Left     CERVICAL DISC SURGERY  04/21/2016    bones spurs removed as well    CERVICAL SPINE SURGERY  04/20/2017    CHOLECYSTECTOMY      CLOSED REDUCTION WRIST FRACTURE  09/02/2021    COLONOSCOPY      COLONOSCOPY N/A 11/22/2022    Procedure: COLONOSCOPY with polypectomy;  Surgeon: Agustín Rolon MD;  Location: [facility] MAIN OR;  Service: Gastroenterology;  Laterality: N/A;  hemorrhoids, polyp    DIAGNOSTIC LAPAROSCOPY      ELBOW PROCEDURE Left     release of nerve similar to carpal tunnel    ENDOSCOPY N/A 11/22/2022    Procedure: ESOPHAGOGASTRODUODENOSCOPY with dilation;  Surgeon: Agustín Rolon MD;  Location: [facility] MAIN OR;  Service: Gastroenterology;  Laterality: N/A;  gastric bypass, hiatal hernia, schatzki's ring, dilated to 20mm    EPIDURAL BLOCK      every 3 months.     EYE SURGERY Left 2000    CYST, CATARACT    FRACTURE SURGERY      GASTRIC BYPASS  10/2010        HAND SURGERY Right      HEMORRHOIDECTOMY      HYSTERECTOMY      INGUINAL HERNIA REPAIR Bilateral     JOINT REPLACEMENT      OOPHORECTOMY      PANNICULECTOMY N/A 11/01/2019    Procedure: PANNICULECTOMY;  Surgeon: Waterhouse, Maurine, MD;  Location: [facility] OR;  Service: Plastics    REPLACEMENT TOTAL KNEE BILATERAL      RIB FRACTURE SURGERY      1ST RIB REMOVED, DUE NUMBNESS IN ARM    ROTATOR CUFF REPAIR Bilateral     SPINE SURGERY         Social History     Socioeconomic History    Marital status:    Tobacco Use    Smoking status: Never    Smokeless tobacco: Never   Vaping Use    Vaping status: Never Used   Substance and Sexual Activity    Alcohol use: Yes     Alcohol/week: 1.0 standard drink of alcohol     Types: 1 Glasses of wine per week     Comment: rare use/some caffeine use    Drug use: No    Sexual activity: Not Currently     Partners: Male     Birth control/protection: Hysterectomy       Family History   Problem Relation Age of Onset    Arthritis Mother     Diabetes Mother     Heart disease Mother         Congestive heart failure    Vision loss Mother     Miscarriages / Stillbirths Mother     Vasculitis Father         Cerebral    Coronary artery disease Father     Early death Father     Hearing loss Father     Heart disease Father     Cancer Father     Stroke Father     Cancer Sister     Diabetes Sister     Hypertension Sister     Diabetes Sister     Diabetes Brother     Diabetes Brother     Cancer Brother         Melanoma    Breast cancer Paternal Aunt     Diabetes Other     Cancer Sister         Melanoma    Cancer Brother     Malig Hyperthermia Neg Hx     Colon cancer Neg Hx     Colon polyps Neg Hx     Crohn's disease Neg Hx     Irritable bowel syndrome Neg Hx     Ulcerative colitis Neg Hx        The following portions of the patient's history were reviewed and updated as appropriate: allergies, current medications, past family history, past medical history, past social history, past surgical history and problem  "list.       Objective:       Vitals:    05/24/24 0941   BP: 150/90   Pulse: 80   SpO2: 92%   Weight: 101 kg (222 lb)   Height: 161.3 cm (63.5\")         Physical Exam:  Constitutional: Well appearing, well developed, no acute distress   HENT: Oropharynx clear and membrane moist  Eyes: Normal conjunctiva, no sclera icterus  Neck: Supple, no carotid bruit bilaterally  Cardiovascular: Regular rate and rhythm, No Murmur, No bilateral lower extremity edema  Pulmonary: Normal respiratory effort, normal lung sounds, no wheezing  Neurological: Alert and orient x 3  Skin: Warm, dry, no ecchymosis, no rash  Psych: Appropriate mood and affect. Normal judgment and insight         Lab Results   Component Value Date     04/24/2024     02/01/2024    K 4.3 04/24/2024    K 4.9 02/01/2024     04/24/2024     02/01/2024    CO2 26 04/24/2024    CO2 25 02/01/2024    BUN 13 04/24/2024    BUN 15 02/01/2024    CREATININE 0.95 04/24/2024    CREATININE 0.89 02/01/2024    EGFRIFNONA 65 02/08/2022    EGFRIFNONA 67 09/17/2021    EGFRIFAFRI 74 02/08/2022    EGFRIFAFRI 81 09/17/2021    GLUCOSE 86 04/24/2024    GLUCOSE 111 (H) 02/01/2024    CALCIUM 9.1 04/24/2024    CALCIUM 9.5 02/01/2024    PROTENTOTREF 6.2 04/24/2024    PROTENTOTREF 7.0 10/24/2023    ALBUMIN 4.0 04/24/2024    ALBUMIN 4.4 10/24/2023    BILITOT 0.4 04/24/2024    BILITOT 0.4 10/24/2023    AST 18 04/24/2024    AST 17 10/24/2023    ALT 13 04/24/2024    ALT 20 10/24/2023     Lab Results   Component Value Date    WBC 7.3 04/24/2024    WBC 10.6 10/24/2023    HGB 13.7 04/24/2024    HGB 14.5 10/24/2023    HCT 40.8 04/24/2024    HCT 43.1 10/24/2023    MCV 87 04/24/2024    MCV 87 10/24/2023     04/24/2024     10/24/2023     Lab Results   Component Value Date    TRIG 149 04/24/2024    TRIG 127 10/24/2023    HDL 72 04/24/2024    HDL 95 10/24/2023     (H) 04/24/2024    LDL 96 10/24/2023     No results found for: \"PROBNP\", \"BNP\"  Lab Results "   Component Value Date    CKTOTAL 106 03/16/2021     Lab Results   Component Value Date    TSH 1.260 04/24/2024    TSH 0.297 (L) 10/24/2023           ECG 12 Lead    Date/Time: 5/24/2024 10:01 AM  Performed by: Nohemy Veliz APRN    Authorized by: Nohemy Veliz APRN  Comparison: compared with previous ECG from 10/30/2023  Similar to previous ECG  Rhythm: sinus rhythm  BPM: 74             Assessment:          Diagnosis Plan   1. 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 Hypertension, unspecified type  ECG 12 Lead    Basic Metabolic Panel      3. Essential hypertension  hydroCHLOROthiazide 12.5 MG tablet      4. Dyspnea on exertion  Stress Test With Myocardial Perfusion One Day             Plan:       Chest pain / dyspnea -her chest pain sounds atypical.  Dyspnea has been progressive and is fairly significant to her.  She also reports significant fatigue.  She had a normal echocardiogram last year.  I am recommending nuclear stress for further evaluation.  Anomalous left main coronary artery -  patient with left main arising from the right coronary cusp taking a posterior course and not intra-arterial and thus a benign course. Does not increase cardiovascular risk  Hyperlipidemia -recently started on atorvastatin 10 mg daily by PCP which I agree with.  She will have repeat labs at their office  Hypertension -BP is a little elevated in the office today and it sounds like this is reflective of how her blood pressure runs at home.  I am recommending increasing hydrochlorothiazide to 25 mg daily with repeat labs in 1 to 2 weeks    Patient is seen today for follow-up.  She has dyspnea on exertion and some intermittent chest pains that she feels is worsening and I recommended nuclear stress.  She had a normal echo last year.  Additionally, will increase  hydrochlorothiazide for better blood pressure control.  With normal stress I recommended increasing activity level including recumbent exercise which may help with her symptoms.    I will follow-up on above testing with recommendations    Follow-up with Dr. Thompson in 6 months      Orders Placed This Encounter   Procedures    Basic Metabolic Panel     Standing Status:   Future     Standing Expiration Date:   5/24/2025     Order Specific Question:   Release to patient     Answer:   Routine Release [[id number]]    Stress Test With Myocardial Perfusion One Day     Standing Status:   Future     Standing Expiration Date:   5/24/2025     Order Specific Question:   What stress agent will be used?     Answer:   Regadenoson (Lexiscan)     Order Specific Question:   Difficulty walking criteria?     Answer:   Poor Exercise Tolerance     Order Specific Question:   Reason for exam?     Answer:   Angina     Order Specific Question:   Release to patient     Answer:   Routine Release [[id number]]    ECG 12 Lead     This order was created via procedure documentation     Order Specific Question:   Release to patient     Answer:   Routine Release [[id number]]            JOAO Almanza  [facility]  05/24/24  10:19 EDT

## 2024-05-29 DIAGNOSIS — I10 ESSENTIAL HYPERTENSION: ICD-10-CM

## 2024-05-29 RX ORDER — VALSARTAN 160 MG/1
160 TABLET ORAL DAILY
Qty: 90 TABLET | Refills: 1 | Status: SHIPPED | OUTPATIENT
Start: 2024-05-29

## 2024-06-04 ENCOUNTER — INFUSION (OUTPATIENT)
Dept: ONCOLOGY | Facility: HOSPITAL | Age: 74
End: 2024-06-04
Payer: MEDICARE

## 2024-06-04 DIAGNOSIS — J45.40 MODERATE PERSISTENT ASTHMA, UNSPECIFIED WHETHER COMPLICATED: Primary | ICD-10-CM

## 2024-06-04 PROCEDURE — 96372 THER/PROPH/DIAG INJ SC/IM: CPT

## 2024-06-04 PROCEDURE — 25010000002 OMALIZUMAB 150 MG/ML SOLUTION PREFILLED SYRINGE: Performed by: ALLERGY & IMMUNOLOGY

## 2024-06-04 RX ADMIN — OMALIZUMAB 150 MG: 150 INJECTION, SOLUTION SUBCUTANEOUS at 10:01

## 2024-06-04 NOTE — NURSING NOTE
Patient tolerated xolair injection without difficulty. Pt had no S/S of reaction and denied need to stay for post observation. Instructed to call her prescribing MD for any concerns prior to next appt. Discharged per ambulation.

## 2024-06-11 ENCOUNTER — TELEPHONE (OUTPATIENT)
Dept: CARDIOLOGY | Facility: CLINIC | Age: 74
End: 2024-06-11
Payer: MEDICARE

## 2024-06-12 ENCOUNTER — LAB (OUTPATIENT)
Dept: LAB | Facility: HOSPITAL | Age: 74
End: 2024-06-12
Payer: MEDICARE

## 2024-06-12 ENCOUNTER — HOSPITAL ENCOUNTER (OUTPATIENT)
Dept: CARDIOLOGY | Facility: HOSPITAL | Age: 74
Discharge: HOME | End: 2024-06-12
Payer: MEDICARE

## 2024-06-12 ENCOUNTER — TELEPHONE (OUTPATIENT)
Dept: CARDIOLOGY | Facility: CLINIC | Age: 74
End: 2024-06-12
Payer: MEDICARE

## 2024-06-12 VITALS — BODY MASS INDEX: 38.98 KG/M2 | WEIGHT: 220 LBS | HEIGHT: 63 IN

## 2024-06-12 DIAGNOSIS — R06.09 DYSPNEA ON EXERTION: ICD-10-CM

## 2024-06-12 DIAGNOSIS — I10 ESSENTIAL HYPERTENSION: ICD-10-CM

## 2024-06-12 DIAGNOSIS — I10 HYPERTENSION, UNSPECIFIED TYPE: ICD-10-CM

## 2024-06-12 LAB
ANION GAP SERPL CALCULATED.3IONS-SCNC: 10.5 MMOL/L (ref 5–15)
BH CV NUCLEAR PRIOR STUDY: 2
BH CV REST NUCLEAR ISOTOPE DOSE: 11.5 MCI
BH CV STRESS BP STAGE 1: NORMAL
BH CV STRESS COMMENTS STAGE 1: NORMAL
BH CV STRESS DOSE REGADENOSON STAGE 1: 0.4
BH CV STRESS DURATION MIN STAGE 1: 0
BH CV STRESS DURATION SEC STAGE 1: 10
BH CV STRESS HR STAGE 1: 121
BH CV STRESS NUCLEAR ISOTOPE DOSE: 35 MCI
BH CV STRESS PROTOCOL 1: NORMAL
BH CV STRESS RECOVERY BP: NORMAL MMHG
BH CV STRESS RECOVERY HR: 95 BPM
BH CV STRESS STAGE 1: 1
BUN SERPL-MCNC: 18 MG/DL (ref 8–23)
BUN/CREAT SERPL: 20.7 (ref 7–25)
CALCIUM SPEC-SCNC: 9.2 MG/DL (ref 8.6–10.5)
CHLORIDE SERPL-SCNC: 104 MMOL/L (ref 98–107)
CO2 SERPL-SCNC: 27.5 MMOL/L (ref 22–29)
CREAT SERPL-MCNC: 0.87 MG/DL (ref 0.57–1)
EGFRCR SERPLBLD CKD-EPI 2021: 71.3 ML/MIN/1.73
GLUCOSE SERPL-MCNC: 81 MG/DL (ref 65–99)
LV EF NUC BP: 81 %
MAXIMAL PREDICTED HEART RATE: 149 BPM
PERCENT MAX PREDICTED HR: 81.21 %
POTASSIUM SERPL-SCNC: 4 MMOL/L (ref 3.5–5.2)
SODIUM SERPL-SCNC: 142 MMOL/L (ref 136–145)
STRESS BASELINE BP: NORMAL MMHG
STRESS BASELINE HR: 73 BPM
STRESS PERCENT HR: 96 %
STRESS POST EXERCISE DUR SEC: 10 SEC
STRESS POST PEAK BP: NORMAL MMHG
STRESS POST PEAK HR: 121 BPM
STRESS TARGET HR: 127 BPM

## 2024-06-12 PROCEDURE — A9502 TC99M TETROFOSMIN: HCPCS | Performed by: NURSE PRACTITIONER

## 2024-06-12 PROCEDURE — 78452 HT MUSCLE IMAGE SPECT MULT: CPT

## 2024-06-12 PROCEDURE — 93017 CV STRESS TEST TRACING ONLY: CPT

## 2024-06-12 PROCEDURE — 0 TECHNETIUM TETROFOSMIN KIT: Performed by: NURSE PRACTITIONER

## 2024-06-12 PROCEDURE — 80048 BASIC METABOLIC PNL TOTAL CA: CPT

## 2024-06-12 PROCEDURE — 36415 COLL VENOUS BLD VENIPUNCTURE: CPT

## 2024-06-12 PROCEDURE — 25010000002 REGADENOSON 0.4 MG/5ML SOLUTION: Performed by: NURSE PRACTITIONER

## 2024-06-12 RX ORDER — HYDROCHLOROTHIAZIDE 25 MG/1
25 TABLET ORAL DAILY
Qty: 90 TABLET | Refills: 3 | Status: SHIPPED | OUTPATIENT
Start: 2024-06-12

## 2024-06-12 RX ORDER — REGADENOSON 0.08 MG/ML
0.4 INJECTION, SOLUTION INTRAVENOUS
Status: COMPLETED | OUTPATIENT
Start: 2024-06-12 | End: 2024-06-12

## 2024-06-12 RX ADMIN — TETROFOSMIN 1 DOSE: 1.38 INJECTION, POWDER, LYOPHILIZED, FOR SOLUTION INTRAVENOUS at 11:52

## 2024-06-12 RX ADMIN — TETROFOSMIN 1 DOSE: 1.38 INJECTION, POWDER, LYOPHILIZED, FOR SOLUTION INTRAVENOUS at 13:08

## 2024-06-12 RX ADMIN — REGADENOSON 0.4 MG: 0.08 INJECTION, SOLUTION INTRAVENOUS at 13:08

## 2024-06-12 NOTE — TELEPHONE ENCOUNTER
Notified patient of results. Patient verbalized understanding. She said her BP has been averaging about 135/78. She is requesting an updated script be sent to her [facility] pharmacy on file if she needs to continue on this regimen.     Nuria Rodriguez RN  Triage [facility]

## 2024-06-12 NOTE — TELEPHONE ENCOUNTER
----- Message from Nohemy Veliz sent at 6/12/2024 12:07 PM EDT -----  Please let her know her labs are stable on increased dose of hydrochlorothiazide.  How is her blood pressure?

## 2024-06-12 NOTE — TELEPHONE ENCOUNTER
Notified patient of recommendations. Patient verbalized understanding.    Nuria Rodriguez RN  Triage [facility]

## 2024-06-20 DIAGNOSIS — F33.41 RECURRENT MAJOR DEPRESSIVE DISORDER, IN PARTIAL REMISSION: ICD-10-CM

## 2024-06-20 RX ORDER — VORTIOXETINE 10 MG/1
10 TABLET, FILM COATED ORAL
Qty: 90 TABLET | Refills: 1 | Status: SHIPPED | OUTPATIENT
Start: 2024-06-20

## 2024-07-02 ENCOUNTER — INFUSION (OUTPATIENT)
Dept: ONCOLOGY | Facility: HOSPITAL | Age: 74
End: 2024-07-02
Payer: MEDICARE

## 2024-07-02 DIAGNOSIS — J45.40 MODERATE PERSISTENT ASTHMA, UNSPECIFIED WHETHER COMPLICATED: Primary | ICD-10-CM

## 2024-07-02 PROCEDURE — 25010000002 OMALIZUMAB 150 MG/ML SOLUTION PREFILLED SYRINGE: Performed by: ALLERGY & IMMUNOLOGY

## 2024-07-02 PROCEDURE — 96372 THER/PROPH/DIAG INJ SC/IM: CPT

## 2024-07-02 RX ADMIN — OMALIZUMAB 150 MG: 150 INJECTION, SOLUTION SUBCUTANEOUS at 09:38

## 2024-07-02 NOTE — NURSING NOTE
Patient tolerated xolair injections without difficulty, DC'D with no S/S of reaction. Instructed to call her prescribing MD for any concerns prior to next appt. Discharged per ambulation.     PT DENIED NEED TO STAY FOR POST WT.

## 2024-07-25 ENCOUNTER — OFFICE VISIT (OUTPATIENT)
Dept: SLEEP MEDICINE | Facility: HOSPITAL | Age: 74
End: 2024-07-25
Payer: MEDICARE

## 2024-07-25 VITALS
DIASTOLIC BLOOD PRESSURE: 72 MMHG | OXYGEN SATURATION: 93 % | SYSTOLIC BLOOD PRESSURE: 111 MMHG | BODY MASS INDEX: 39.16 KG/M2 | HEIGHT: 63 IN | HEART RATE: 86 BPM | WEIGHT: 221 LBS

## 2024-07-25 DIAGNOSIS — G47.33 OSA ON CPAP: Primary | ICD-10-CM

## 2024-07-25 DIAGNOSIS — E66.9 CLASS 2 OBESITY: ICD-10-CM

## 2024-07-25 PROCEDURE — 1159F MED LIST DOCD IN RCRD: CPT | Performed by: INTERNAL MEDICINE

## 2024-07-25 PROCEDURE — G0463 HOSPITAL OUTPT CLINIC VISIT: HCPCS

## 2024-07-25 PROCEDURE — 1160F RVW MEDS BY RX/DR IN RCRD: CPT | Performed by: INTERNAL MEDICINE

## 2024-07-25 PROCEDURE — 99213 OFFICE O/P EST LOW 20 MIN: CPT | Performed by: INTERNAL MEDICINE

## 2024-07-25 NOTE — PROGRESS NOTES
"  [facility]  [address]  [address]  [place] [address]  Phone   Fax       SLEEP CLINIC FOLLOW UP PROGRESS NOTE.    Octavia Pendleton  [id number]   1950  73 y.o.  female      PCP: Kehrer, Meredith Lea, MD      Date of visit: 7/25/2024    Chief Complaint   Patient presents with    Sleep Apnea    Obesity       HPI:  This is a 73 y.o. years old patient is here for the management of obstructive sleep apnea.  Sleep apnea is mild in severity with a LACY 6/hr. Patient is using positive airway pressure therapy with auto CPAP and the symptoms of sleep apnea have improved significantly on the therapy. Normally patient goes to bed at 11 PM and wakes up at 730 AM .  The patient wakes up 3 time(s) during the night and has no problem going back to sleep.  Feels refreshed after waking up.     Medications and allergies are reviewed by me and documented in the encounter.     SOCIAL (habits pertaining to sleep medicine)  History tobacco use:No   History of alcohol use: 0 per week  Caffeine use: 0     REVIEW OF SYSTEMS:   Pertaining positive symptoms are:  Issue Sleepiness Scale :Total score: 11   Nasal congestion  Heartburn      PHYSICAL EXAMINATION:  CONSTITUTIONAL:  Vitals:    07/25/24 0900   BP: 111/72   Pulse: 86   SpO2: 93%   Weight: 100 kg (221 lb)   Height: 160 cm (63\")    Body mass index is 39.15 kg/m².   NOSE: nasal passages are clear, No deformities noted   RESP SYSTEM: Not in any respiratory distress, no chest deformities noted,   CARDIOVASULAR: No edema noted  NEURO: Oriented x 3, gait normal,  Mood and affect appeared appropriate      Data reviewed:  The Smart card downloaded on 7/25/2024 has been reviewed independently by me for compliance and discussed the data with the patient.   Data card has no information  Mask type: Fullface mask  Device: Micaela  DME: [employer]    Patient is going to bring the device for the download      ASSESSMENT AND PLAN:  Obstructive sleep " apnea ( G 47.33).  The symptoms of sleep apnea have improved with the device and the treatment.  Patient's compliance with the device is excellent for treatment of sleep apnea.  I have independently reviewed the smart card down load and discussed with the patient the download data and encouarged the patient to continue to use the device.The residual AHI is acceptable. The device is benefiting the patient and the device is medically necessary.  Without proper control of sleep apnea and good compliance there is a increased risk for hypertension, diabetes mellitus and nonrestorative sleep with hypersomnia which can increase risk for motor vehicle accidents.  Untreated sleep apnea is also a risk factor for development of atrial fibrillation, pulmonary hypertension, insulin resistance and stroke. The patient is also instructed to get the supplies from the [facility] and and change them on a regular basis.  A prescription for supplies has been sent to the [facility].  I have also discussed the good sleep hygiene habits and adequate amount of sleep needed for good health.  Obesity  2 with BMI is Body mass index is 39.15 kg/m².. I have discuss the relationship between the weight and sleep apnea. The benefit of weight loss in reducing severity of sleep apnea was discussed. Discussed diet and exercise with the patient to achieve ideal BMI.   Return in about 1 year (around 7/25/2025). . Patient's questions were answered.    7/25/2024  Erica Santa MD  Sleep Medicine.  Medical Director,   [facility] sleep centers.

## 2024-07-30 ENCOUNTER — TELEPHONE (OUTPATIENT)
Dept: SLEEP MEDICINE | Facility: HOSPITAL | Age: 74
End: 2024-07-30
Payer: MEDICARE

## 2024-07-30 ENCOUNTER — INFUSION (OUTPATIENT)
Dept: ONCOLOGY | Facility: HOSPITAL | Age: 74
End: 2024-07-30
Payer: MEDICARE

## 2024-07-30 DIAGNOSIS — J45.40 MODERATE PERSISTENT ASTHMA, UNSPECIFIED WHETHER COMPLICATED: Primary | ICD-10-CM

## 2024-07-30 PROCEDURE — 25010000002 OMALIZUMAB 150 MG/ML SOLUTION PREFILLED SYRINGE: Performed by: ALLERGY & IMMUNOLOGY

## 2024-07-30 PROCEDURE — 96372 THER/PROPH/DIAG INJ SC/IM: CPT

## 2024-07-30 RX ADMIN — OMALIZUMAB 150 MG: 150 INJECTION, SOLUTION SUBCUTANEOUS at 09:23

## 2024-08-05 NOTE — PROGRESS NOTES
1105-Injection site clear.  Discharged hoe ambulatory with friend.    Havent seen, hasn't been seen here in 6+ months

## 2024-08-27 ENCOUNTER — INFUSION (OUTPATIENT)
Dept: ONCOLOGY | Facility: HOSPITAL | Age: 74
End: 2024-08-27
Payer: MEDICARE

## 2024-08-27 DIAGNOSIS — J45.40 MODERATE PERSISTENT ASTHMA, UNSPECIFIED WHETHER COMPLICATED: Primary | ICD-10-CM

## 2024-08-27 PROCEDURE — 96372 THER/PROPH/DIAG INJ SC/IM: CPT

## 2024-08-27 PROCEDURE — 25010000002 OMALIZUMAB 150 MG/ML SOLUTION PREFILLED SYRINGE: Performed by: ALLERGY & IMMUNOLOGY

## 2024-08-27 RX ADMIN — OMALIZUMAB 150 MG: 150 INJECTION, SOLUTION SUBCUTANEOUS at 09:51

## 2024-08-27 NOTE — NURSING NOTE
Patient tolerated xolair injection without difficulty, with no S/S of reaction. Instructed to call her prescribing MD for any concerns prior to next appt. Reviewed date of next appointment on 9/24/24. Discharged per ambulation in stable condition.

## 2024-09-08 DIAGNOSIS — E03.9 HYPOTHYROIDISM, UNSPECIFIED TYPE: ICD-10-CM

## 2024-09-09 RX ORDER — LEVOTHYROXINE SODIUM 50 UG/1
TABLET ORAL
Qty: 90 TABLET | Refills: 0 | Status: SHIPPED | OUTPATIENT
Start: 2024-09-09

## 2024-09-24 ENCOUNTER — INFUSION (OUTPATIENT)
Dept: ONCOLOGY | Facility: HOSPITAL | Age: 74
End: 2024-09-24
Payer: MEDICARE

## 2024-09-24 DIAGNOSIS — J45.40 MODERATE PERSISTENT ASTHMA, UNSPECIFIED WHETHER COMPLICATED: Primary | ICD-10-CM

## 2024-09-24 PROCEDURE — 25010000002 OMALIZUMAB 150 MG/ML SOLUTION PREFILLED SYRINGE: Performed by: ALLERGY & IMMUNOLOGY

## 2024-09-24 PROCEDURE — 96372 THER/PROPH/DIAG INJ SC/IM: CPT

## 2024-09-24 RX ORDER — PREDNISONE 20 MG/1
TABLET ORAL
COMMUNITY
Start: 2024-09-20

## 2024-09-24 RX ADMIN — OMALIZUMAB 150 MG: 150 INJECTION, SOLUTION SUBCUTANEOUS at 09:52

## 2024-10-22 ENCOUNTER — INFUSION (OUTPATIENT)
Dept: ONCOLOGY | Facility: HOSPITAL | Age: 74
End: 2024-10-22
Payer: MEDICARE

## 2024-10-22 DIAGNOSIS — J45.40 MODERATE PERSISTENT ASTHMA, UNSPECIFIED WHETHER COMPLICATED: Primary | ICD-10-CM

## 2024-10-22 PROCEDURE — 25010000002 OMALIZUMAB 150 MG/ML SOLUTION PREFILLED SYRINGE: Performed by: ALLERGY & IMMUNOLOGY

## 2024-10-22 PROCEDURE — 96372 THER/PROPH/DIAG INJ SC/IM: CPT

## 2024-10-22 RX ADMIN — OMALIZUMAB 150 MG: 150 INJECTION, SOLUTION SUBCUTANEOUS at 09:27

## 2024-10-22 NOTE — NURSING NOTE
Patient tolerated xolair injection without difficulty. Declined post observation. Instructed to call her prescribing MD for any concerns prior to next appt. Discharged in stable condition.

## 2024-10-27 DIAGNOSIS — E78.49 OTHER HYPERLIPIDEMIA: ICD-10-CM

## 2024-10-28 RX ORDER — ATORVASTATIN CALCIUM 10 MG/1
10 TABLET, FILM COATED ORAL NIGHTLY
Qty: 90 TABLET | Refills: 1 | Status: SHIPPED | OUTPATIENT
Start: 2024-10-28

## 2024-10-28 NOTE — TELEPHONE ENCOUNTER
Rx Refill Note  Requested Prescriptions     Pending Prescriptions Disp Refills    atorvastatin (LIPITOR) 10 MG tablet [Pharmacy Med Name: ATORVASTATIN 10 MG TABLET] 90 tablet 1     Sig: TAKE ONE TABLET BY MOUTH ONCE NIGHTLY      Last office visit with prescribing clinician: 4/24/2024   Last telemedicine visit with prescribing clinician: Visit date not found   Next office visit with prescribing clinician: 10/30/2024                         Would you like a call back once the refill request has been completed: [] Yes [] No    If the office needs to give you a call back, can they leave a voicemail: [] Yes [] No    Kimberly Henriquez MA  10/28/24, 07:57 EDT

## 2024-10-30 ENCOUNTER — OFFICE VISIT (OUTPATIENT)
Dept: FAMILY MEDICINE CLINIC | Facility: CLINIC | Age: 74
End: 2024-10-30
Payer: MEDICARE

## 2024-10-30 VITALS
SYSTOLIC BLOOD PRESSURE: 128 MMHG | DIASTOLIC BLOOD PRESSURE: 70 MMHG | BODY MASS INDEX: 40.15 KG/M2 | TEMPERATURE: 98.3 F | WEIGHT: 226.6 LBS | HEIGHT: 63 IN | HEART RATE: 76 BPM | OXYGEN SATURATION: 98 %

## 2024-10-30 DIAGNOSIS — G47.33 OSA ON CPAP: ICD-10-CM

## 2024-10-30 DIAGNOSIS — E51.9 THIAMINE DEFICIENCY: ICD-10-CM

## 2024-10-30 DIAGNOSIS — I10 ESSENTIAL HYPERTENSION: ICD-10-CM

## 2024-10-30 DIAGNOSIS — E55.9 VITAMIN D DEFICIENCY: ICD-10-CM

## 2024-10-30 DIAGNOSIS — E03.9 HYPOTHYROIDISM, UNSPECIFIED TYPE: ICD-10-CM

## 2024-10-30 DIAGNOSIS — E78.49 OTHER HYPERLIPIDEMIA: ICD-10-CM

## 2024-10-30 DIAGNOSIS — R73.09 ABNORMAL GLUCOSE: ICD-10-CM

## 2024-10-30 DIAGNOSIS — J45.40 MODERATE PERSISTENT ASTHMA WITHOUT COMPLICATION: ICD-10-CM

## 2024-10-30 DIAGNOSIS — E53.8 VITAMIN B12 DEFICIENCY: ICD-10-CM

## 2024-10-30 DIAGNOSIS — Z98.84 S/P GASTRIC BYPASS: ICD-10-CM

## 2024-10-30 DIAGNOSIS — Z00.00 MEDICARE ANNUAL WELLNESS VISIT, SUBSEQUENT: Primary | ICD-10-CM

## 2024-10-30 DIAGNOSIS — F33.41 RECURRENT MAJOR DEPRESSIVE DISORDER, IN PARTIAL REMISSION: ICD-10-CM

## 2024-10-30 DIAGNOSIS — K21.9 GASTROESOPHAGEAL REFLUX DISEASE WITHOUT ESOPHAGITIS: ICD-10-CM

## 2024-10-30 RX ORDER — VONOPRAZAN FUMARATE 26.72 MG/1
20 TABLET ORAL DAILY
Qty: 30 TABLET | Refills: 1 | Status: SHIPPED | OUTPATIENT
Start: 2024-10-30 | End: 2024-11-04 | Stop reason: SDUPTHER

## 2024-10-30 RX ORDER — THIAMINE HYDROCHLORIDE 100 MG/ML
200 INJECTION, SOLUTION INTRAMUSCULAR; INTRAVENOUS
Qty: 6 ML | Refills: 3 | Status: SHIPPED | OUTPATIENT
Start: 2024-10-30

## 2024-10-30 RX ORDER — FLUTICASONE FUROATE, UMECLIDINIUM BROMIDE AND VILANTEROL TRIFENATATE 200; 62.5; 25 UG/1; UG/1; UG/1
1 POWDER RESPIRATORY (INHALATION)
COMMUNITY

## 2024-10-30 RX ORDER — CYANOCOBALAMIN 1000 UG/ML
1000 INJECTION, SOLUTION INTRAMUSCULAR; SUBCUTANEOUS
Qty: 3 ML | Refills: 3 | Status: SHIPPED | OUTPATIENT
Start: 2024-10-30

## 2024-10-30 NOTE — PATIENT INSTRUCTIONS
Medicare Wellness  Personal Prevention Plan of Service     Date of Office Visit:    Encounter Provider:  Meredith Lea Kehrer, MD  Place of Service:  [facility]  Patient Name: Octavia Pendleton  :  1950    As part of the Medicare Wellness portion of your visit today, we are providing you with this personalized preventive plan of services (PPPS). This plan is based upon recommendations of the [place] Preventive Services Task Force (USPSTF) and the Advisory Committee on Immunization Practices (ACIP).    This lists the preventive care services that should be considered, and provides dates of when you are due. Items listed as completed are up-to-date and do not require any further intervention.    Health Maintenance   Topic Date Due    COVID-19 Vaccine ( season) 2024    ANNUAL WELLNESS VISIT  10/24/2024    DXA SCAN  10/28/2025 (Originally 10/28/2022)    LIPID PANEL  2025    BMI FOLLOWUP  10/30/2025    MAMMOGRAM  2025    COLORECTAL CANCER SCREENING  2027    TDAP/TD VACCINES (2 - Td or Tdap) 2031    HEPATITIS C SCREENING  Completed    INFLUENZA VACCINE  Completed    Pneumococcal Vaccine 65+  Completed    ZOSTER VACCINE  Completed    PAP SMEAR  Discontinued       Orders Placed This Encounter   Procedures    Vitamin B12     Order Specific Question:   Release to patient     Answer:   Routine Release [[id number]]    TSH     Order Specific Question:   Release to patient     Answer:   Routine Release [[id number]]    Vitamin D,25-Hydroxy     Order Specific Question:   Release to patient     Answer:   Routine Release [[id number]]    Ferritin     Order Specific Question:   Release to patient     Answer:   Routine Release [[id number]]    Iron     Order Specific Question:   Release to patient     Answer:   Routine Release [[id number]]    Lipid Panel     Order Specific Question:   Release to patient     Answer:   Routine Release [[id number]]     Hemoglobin A1c     Order Specific Question:   Release to patient     Answer:   Routine Release [[id number]]    Comprehensive Metabolic Panel     Order Specific Question:   Release to patient     Answer:   Routine Release [[id number]]    Vitamin B1, Whole Blood     Order Specific Question:   Release to patient     Answer:   Routine Release [[id number]]    CBC & Differential     Order Specific Question:   Manual Differential     Answer:   No     Order Specific Question:   Release to patient     Answer:   Routine Release [[id number]]       Return in about 6 weeks (around 12/11/2024) for Recheck [facility].

## 2024-10-30 NOTE — PROGRESS NOTES
The ABCs of the Annual Wellness Visit  Subsequent Medicare Wellness Visit    Subjective    Octavia Pendleton is a 74 y.o. female who presents for a Subsequent Medicare Wellness Visit.    The following portions of the patient's history were reviewed and   updated as appropriate: allergies, current medications, past family history, past medical history, past social history, past surgical history, and problem list.    Compared to one year ago, the patient feels her physical   health is worse.    Compared to one year ago, the patient feels her mental   health is the same.    Recent Hospitalizations:  She was not admitted within the past 365 days at any hospital.       Current Medical Providers:  Patient Care Team:  Kehrer, Meredith Lea, MD as PCP - General (Family Medicine)  Agustín Rolon MD as Consulting Physician (Gastroenterology)  Gwendolyn Silvestre APRN as Nurse Practitioner (Gastroenterology)    Outpatient Medications Prior to Visit   Medication Sig Dispense Refill    albuterol (PROVENTIL HFA;VENTOLIN HFA) 108 (90 BASE) MCG/ACT inhaler Inhale 2 puffs Every 4 (Four) Hours As Needed for Wheezing.      aspirin 81 MG chewable tablet Chew 1 tablet Daily.      atorvastatin (LIPITOR) 10 MG tablet TAKE ONE TABLET BY MOUTH ONCE NIGHTLY 90 tablet 1    azelastine (ASTELIN) 0.1 % nasal spray 1 spray into the nostril(s) as directed by provider 2 (Two) Times a Day. Use in each nostril as directed 30 mL 2    azelastine (OPTIVAR) 0.05 % ophthalmic solution Administer 1 drop to both eyes 2 (Two) Times a Day. 6 each 3    cyanocobalamin 1000 MCG/ML injection Inject 1 mL under the skin into the appropriate area as directed Every 30 (Thirty) Days. 3 mL 3    EPINEPHrine (EPIPEN) 0.3 MG/0.3ML solution auto-injector injection       esomeprazole (nexIUM) 40 MG capsule TAKE ONE CAPSULE BY MOUTH TWICE A  capsule 1    ferrous sulfate 325 (65 FE) MG tablet Take 1 tablet by mouth Daily With Breakfast. 90 tablet 3    fexofenadine  (ALLEGRA) 180 MG tablet Take 1 tablet by mouth Daily.      fluticasone (FLONASE) 50 MCG/ACT nasal spray Administer 2 sprays into the nostril(s) as directed by provider Daily. Administer 2 sprays in each nostril for each dose.      hydroCHLOROthiazide 25 MG tablet Take 1 tablet by mouth Daily. 90 tablet 3    hydrOXYzine (ATARAX) 10 MG tablet Take 1 tablet by mouth 3 (Three) Times a Day As Needed for Anxiety. 270 tablet 1    KLOR-CON 20 MEQ CR tablet TAKE ONE TABLET BY MOUTH TWICE A  tablet 3    levalbuterol (XOPENEX) 0.63 MG/3ML nebulizer solution Take 1 ampule by nebulization Every 4 (Four) Hours As Needed for Wheezing.      levothyroxine (SYNTHROID, LEVOTHROID) 50 MCG tablet TAKE 1 TABLET BY MOUTH DAILY 90 tablet 0    metaxalone (Skelaxin) 800 MG tablet Take 0.5-1 tablets by mouth 3 (Three) Times a Day As Needed for Muscle Spasms. 60 tablet 0    omalizumab (XOLAIR) 150 MG injection Inject 1.2 mL under the skin into the appropriate area as directed Every 30 (Thirty) Days.      Probiotic Product (FLORAJEN3) capsule Take 1 tablet by mouth daily.      valsartan (DIOVAN) 160 MG tablet TAKE 1 TABLET BY MOUTH DAILY 90 tablet 1    Vortioxetine HBr (Trintellix) 10 MG tablet tablet TAKE 1 TABLET BY MOUTH DAILY WITH BREAKFAST 90 tablet 1    Fluticasone-Umeclidin-Vilant (Trelegy Ellipta) 200-62.5-25 MCG/ACT inhaler Inhale 1 puff Daily.      thiamine (B-1) 100 MG/ML injection Inject 2 mL into the appropriate muscle as directed by prescriber Every 30 (Thirty) Days. (Patient not taking: Reported on 10/30/2024) 6 mL 3    Fluticasone Furoate-Vilanterol (BREO ELLIPTA IN) Inhale 2 puffs Daily. (Patient not taking: Reported on 10/30/2024)      predniSONE (DELTASONE) 20 MG tablet  (Patient not taking: Reported on 10/30/2024)       Facility-Administered Medications Prior to Visit   Medication Dose Route Frequency Provider Last Rate Last Admin    cyanocobalamin injection 1,000 mcg  1,000 mcg Intramuscular Q28 Days Kehrer, Meredith  "MD Sallie   1,000 mcg at 03/16/21 1515    thiamine (B-1) injection 100 mg  100 mg Intramuscular Daily Deny Izquierdo APRN   100 mg at 03/07/24 1020    thiamine (B-1) injection 200 mg  200 mg Intramuscular Daily Kehrer, Meredith Lea, MD   200 mg at 09/26/22 1143    thiamine (B-1) injection 200 mg  200 mg Intramuscular Daily Kehrer, Meredith Lea, MD   200 mg at 03/15/23 1426       No opioid medication identified on active medication list. I have reviewed chart for other potential  high risk medication/s and harmful drug interactions in the elderly.        Aspirin is on active medication list. Aspirin use is indicated based on review of current medical condition/s. Pros and cons of this therapy have been discussed today. Benefits of this medication outweigh potential harm.  Patient has been encouraged to continue taking this medication.  .      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  ACP discussion was held with the patient during this visit. Patient has an advance directive (not in EMR), copy requested.     Objective    Vitals:    10/30/24 0937   BP: 128/70   Pulse: 76   Temp: 98.3 °F (36.8 °C)   SpO2: 98%   Weight: 103 kg (226 lb 9.6 oz)   Height: 160 cm (63\")     Estimated body mass index is 40.14 kg/m² as calculated from the following:    Height as " "of this encounter: 160 cm (63\").    Weight as of this encounter: 103 kg (226 lb 9.6 oz).    Class 3 Severe Obesity (BMI >=40). Obesity-related health conditions include the following: hypertension and dyslipidemias. Obesity is unchanged. BMI is is above average; BMI management plan is completed. We discussed portion control and increasing exercise.      Does the patient have evidence of cognitive impairment? No          HEALTH RISK ASSESSMENT    Smoking Status:  Social History     Tobacco Use   Smoking Status Never   Smokeless Tobacco Never     Alcohol Consumption:  Social History     Substance and Sexual Activity   Alcohol Use Yes    Alcohol/week: 1.0 standard drink of alcohol    Types: 1 Glasses of wine per week    Comment: Rarely     Fall Risk Screen:    KIARA Fall Risk Assessment was completed, and patient is at MODERATE risk for falls. Assessment completed on:10/30/2024    Depression Screening:      10/30/2024     9:33 AM   PHQ-2/PHQ-9 Depression Screening   Little interest or pleasure in doing things Not at all   Feeling down, depressed, or hopeless Several days   How difficult have these problems made it for you to do your work, take care of things at home, or get along with other people? Not difficult at all       Health Habits and Functional and Cognitive Screening:      10/30/2024     9:34 AM   Functional & Cognitive Status   Do you have difficulty preparing food and eating? No   Do you have difficulty bathing yourself, getting dressed or grooming yourself? No   Do you have difficulty using the toilet? No   Do you have difficulty moving around from place to place? No   Do you have trouble with steps or getting out of a bed or a chair? No   Current Diet Well Balanced Diet   Dental Exam Not up to date   Eye Exam Not up to date   Exercise (times per week) 1 times per week   Current Exercises Include Home Exercise Program (TV, Computer, Etc.);Walking;Yard Work;House Cleaning;Gardening   Do you need help using " the phone?  No   Are you deaf or do you have serious difficulty hearing?  No   Do you need help to go to places out of walking distance? No   Do you need help shopping? No   Do you need help preparing meals?  No   Do you need help with housework?  Yes   Do you need help with laundry? No   Do you need help taking your medications? No   Do you need help managing money? No   Do you ever drive or ride in a car without wearing a seat belt? No   Have you felt unusual stress, anger or loneliness in the last month? No   If you need help, do you have trouble finding someone available to you? No   Do you have difficulty concentrating, remembering or making decisions? No       Age-appropriate Screening Schedule:  Refer to the list below for future screening recommendations based on patient's age, sex and/or medical conditions. Orders for these recommended tests are listed in the plan section. The patient has been provided with a written plan.    Health Maintenance   Topic Date Due    COVID-19 Vaccine (6 - 2023-24 season) 09/01/2024    ANNUAL WELLNESS VISIT  10/24/2024    DXA SCAN  10/28/2025 (Originally 10/28/2022)    LIPID PANEL  04/24/2025    BMI FOLLOWUP  10/30/2025    MAMMOGRAM  11/07/2025    COLORECTAL CANCER SCREENING  11/22/2027    TDAP/TD VACCINES (2 - Td or Tdap) 09/17/2031    HEPATITIS C SCREENING  Completed    INFLUENZA VACCINE  Completed    Pneumococcal Vaccine 65+  Completed    ZOSTER VACCINE  Completed    PAP SMEAR  Discontinued                  CMS Preventative Services Quick Reference  Risk Factors Identified During Encounter  None Identified  The above risks/problems have been discussed with the patient.  Pertinent information has been shared with the patient in the After Visit Summary.  An After Visit Summary and PPPS were made available to the patient.    Follow Up:   Next Medicare Wellness visit to be scheduled in 1 year.       Additional E&M Note during same encounter follows:  Patient has multiple medical  "problems which are significant and separately identifiable that require additional work above and beyond the Medicare Wellness Visit.      Chief Complaint  Medicare Wellness-subsequent, Hypertension, Hyperlipidemia, Hypothyroidism, Depression, and Fatigue    Subjective        HPI  Octavia Pendleton is also being seen today for follow up.  History of Present Illness  The patient is a 74-year-old female who presents for a Medicare wellness visit and follow-up on hypertension, hypothyroidism, reflux, dyslipidemia, depression, and other medical issues.    She reports feeling unwell today, with pain throughout her body. She consumed an Atkins drink at 6:00 AM today. She gained 20 pounds in 2 weeks during a period of significant health issues. Recently, she has eliminated snacks from her diet, consuming only Atkins drinks for breakfast and lunch, and a balanced meal for dinner. Despite these changes, she has gained 1 or 2 pounds.    She is currently on Trelegy and has been referred to an ENT specialist, with her first appointment scheduled for 02/2025. She reports no heartburn or reflux symptoms but does experience a sensation of fullness in her epigastrium. She is open to trying new medications.    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 This issue was addressed by an ENT specialist who administered an injection. She has not experienced any problems since then. She receives spinal injections every 3 months, which have been beneficial. Her next injection is due in the last week of 11/2024.    FAMILY HISTORY  Her daughter has myelodysplastic syndrome.            Objective   Vital Signs:  /70   Pulse 76   Temp 98.3 °F (36.8 °C)   Ht 160 cm (63\")   Wt 103 kg (226 lb 9.6 oz)   SpO2 98%   BMI 40.14 kg/m²     Physical Exam  Vitals and nursing note reviewed.   Constitutional:       General: She is not in acute distress.     Appearance: Normal appearance. She is " well-developed.   HENT:      Head: Normocephalic and atraumatic.      Right Ear: Tympanic membrane, ear canal and external ear normal.      Left Ear: Tympanic membrane, ear canal and external ear normal.      Nose: Nose normal.      Mouth/Throat:      Mouth: Mucous membranes are moist.      Pharynx: Oropharynx is clear. No oropharyngeal exudate or posterior oropharyngeal erythema.   Eyes:      Conjunctiva/sclera: Conjunctivae normal.      Pupils: Pupils are equal, round, and reactive to light.   Neck:      Thyroid: No thyromegaly.   Cardiovascular:      Rate and Rhythm: Normal rate and regular rhythm.      Heart sounds: No murmur heard.  Pulmonary:      Effort: Pulmonary effort is normal.      Breath sounds: Normal breath sounds. No wheezing.   Abdominal:      General: Abdomen is flat. Bowel sounds are normal. There is no distension.      Palpations: Abdomen is soft. There is no mass.      Tenderness: There is abdominal tenderness. There is no guarding or rebound.      Hernia: No hernia is present.      Comments: Mild epigastric tenderness without guarding or rebound   Musculoskeletal:         General: No swelling. Normal range of motion.      Cervical back: Normal range of motion and neck supple.      Right lower leg: No edema.      Left lower leg: No edema.   Lymphadenopathy:      Cervical: No cervical adenopathy.   Skin:     General: Skin is warm and dry.      Capillary Refill: Capillary refill takes less than 2 seconds.      Findings: No rash.   Neurological:      General: No focal deficit present.      Mental Status: She is alert and oriented to person, place, and time.      Cranial Nerves: No cranial nerve deficit.   Psychiatric:         Mood and Affect: Mood normal.         Behavior: Behavior normal.        Physical Exam                  Results            Assessment and Plan   Diagnoses and all orders for this visit:    1. Medicare annual wellness visit, subsequent (Primary)    2. Essential hypertension  -      Lipid Panel  -     Hemoglobin A1c  -     CBC & Differential  -     Comprehensive Metabolic Panel    3. Other hyperlipidemia  -     Lipid Panel  -     Comprehensive Metabolic Panel    4. Hypothyroidism, unspecified type  -     TSH    5. Recurrent major depressive disorder, in partial remission    6. S/P gastric bypass  -     Vitamin B12  -     TSH  -     Vitamin D,25-Hydroxy  -     Ferritin  -     Iron  -     Lipid Panel  -     Hemoglobin A1c  -     CBC & Differential  -     Comprehensive Metabolic Panel  -     Vitamin B1, Whole Blood    7. Gastroesophageal reflux disease without esophagitis    8. Vitamin B12 deficiency  -     Vitamin B12    9. Thiamine deficiency  -     Vitamin B1, Whole Blood    10. Vitamin D deficiency  -     Vitamin D,25-Hydroxy    11. JAY on CPAP    12. Moderate persistent asthma without complication    13. Abnormal glucose  -     Hemoglobin A1c      Assessment & Plan  1. Hypertension.  Her blood pressure readings are within the normal range. She should continue her current medication regimen.    2. Hypothyroidism.  No specific issues were discussed regarding hypothyroidism. Continue current treatment.    3. Gastroesophageal Reflux Disease (GERD).  She reports epigastric discomfort and a feeling of fullness. A switch from Nexium to Voquenza is recommended to see if it alleviates her symptoms and potentially reduces irritation of her vocal cords. \    4. Dyslipidemia.  Her last cholesterol was a little high. She will undergo blood work today to check her current levels. If the results are poor, further management will be discussed.    5. Depression.  She reports that her mood is generally stable and her depression is mostly in remission, although today is a bad day due to overall discomfort. Continue current medication regimen.    6. Chronic Pain.  She experiences widespread pain and receives spinal injections every three months. Her next injection is scheduled for the last week of November.  She reports feeling miserable and suspects the current injection may not be as effective.    7. Health Maintenance.  A comprehensive blood work will be conducted today, including a vitamin panel.              Follow Up   No follow-ups on file.  Patient was given instructions and counseling regarding her condition or for health maintenance advice. Please see specific information pulled into the AVS if appropriate.     Patient or patient representative verbalized consent for the use of Ambient Listening during the visit with  Meredith Lea Kehrer, MD for chart documentation. 10/30/2024  10:04 EDT

## 2024-11-02 DIAGNOSIS — F33.41 RECURRENT MAJOR DEPRESSIVE DISORDER, IN PARTIAL REMISSION: ICD-10-CM

## 2024-11-04 ENCOUNTER — TELEPHONE (OUTPATIENT)
Dept: FAMILY MEDICINE CLINIC | Facility: CLINIC | Age: 74
End: 2024-11-04

## 2024-11-04 DIAGNOSIS — K21.9 GASTROESOPHAGEAL REFLUX DISEASE WITHOUT ESOPHAGITIS: Primary | ICD-10-CM

## 2024-11-04 LAB
25(OH)D3+25(OH)D2 SERPL-MCNC: 38.2 NG/ML (ref 30–100)
ALBUMIN SERPL-MCNC: 4.2 G/DL (ref 3.8–4.8)
ALP SERPL-CCNC: 97 IU/L (ref 44–121)
ALT SERPL-CCNC: 15 IU/L (ref 0–32)
AST SERPL-CCNC: 20 IU/L (ref 0–40)
BASOPHILS # BLD AUTO: 0.1 X10E3/UL (ref 0–0.2)
BASOPHILS NFR BLD AUTO: 1 %
BILIRUB SERPL-MCNC: 0.5 MG/DL (ref 0–1.2)
BUN SERPL-MCNC: 24 MG/DL (ref 8–27)
BUN/CREAT SERPL: 24 (ref 12–28)
CALCIUM SERPL-MCNC: 9.3 MG/DL (ref 8.7–10.3)
CHLORIDE SERPL-SCNC: 105 MMOL/L (ref 96–106)
CHOLEST SERPL-MCNC: 171 MG/DL (ref 100–199)
CO2 SERPL-SCNC: 25 MMOL/L (ref 20–29)
CREAT SERPL-MCNC: 0.98 MG/DL (ref 0.57–1)
EGFRCR SERPLBLD CKD-EPI 2021: 61 ML/MIN/1.73
EOSINOPHIL # BLD AUTO: 0.2 X10E3/UL (ref 0–0.4)
EOSINOPHIL NFR BLD AUTO: 2 %
ERYTHROCYTE [DISTWIDTH] IN BLOOD BY AUTOMATED COUNT: 13 % (ref 11.7–15.4)
FERRITIN SERPL-MCNC: 134 NG/ML (ref 15–150)
GLOBULIN SER CALC-MCNC: 2.2 G/DL (ref 1.5–4.5)
GLUCOSE SERPL-MCNC: 95 MG/DL (ref 70–99)
HBA1C MFR BLD: 5.8 % (ref 4.8–5.6)
HCT VFR BLD AUTO: 43.2 % (ref 34–46.6)
HDLC SERPL-MCNC: 75 MG/DL
HGB BLD-MCNC: 13.7 G/DL (ref 11.1–15.9)
IMM GRANULOCYTES # BLD AUTO: 0 X10E3/UL (ref 0–0.1)
IMM GRANULOCYTES NFR BLD AUTO: 0 %
IRON SERPL-MCNC: 79 UG/DL (ref 27–139)
LDLC SERPL CALC-MCNC: 79 MG/DL (ref 0–99)
LYMPHOCYTES # BLD AUTO: 3.3 X10E3/UL (ref 0.7–3.1)
LYMPHOCYTES NFR BLD AUTO: 38 %
MCH RBC QN AUTO: 29.1 PG (ref 26.6–33)
MCHC RBC AUTO-ENTMCNC: 31.7 G/DL (ref 31.5–35.7)
MCV RBC AUTO: 92 FL (ref 79–97)
MONOCYTES # BLD AUTO: 1.2 X10E3/UL (ref 0.1–0.9)
MONOCYTES NFR BLD AUTO: 14 %
NEUTROPHILS # BLD AUTO: 3.9 X10E3/UL (ref 1.4–7)
NEUTROPHILS NFR BLD AUTO: 45 %
PLATELET # BLD AUTO: 285 X10E3/UL (ref 150–450)
POTASSIUM SERPL-SCNC: 4.2 MMOL/L (ref 3.5–5.2)
PROT SERPL-MCNC: 6.4 G/DL (ref 6–8.5)
RBC # BLD AUTO: 4.7 X10E6/UL (ref 3.77–5.28)
SODIUM SERPL-SCNC: 144 MMOL/L (ref 134–144)
TRIGL SERPL-MCNC: 97 MG/DL (ref 0–149)
TSH SERPL DL<=0.005 MIU/L-ACNC: 0.73 UIU/ML (ref 0.45–4.5)
VIT B1 BLD-SCNC: 124.2 NMOL/L (ref 66.5–200)
VIT B12 SERPL-MCNC: 382 PG/ML (ref 232–1245)
VLDLC SERPL CALC-MCNC: 17 MG/DL (ref 5–40)
WBC # BLD AUTO: 8.6 X10E3/UL (ref 3.4–10.8)

## 2024-11-04 RX ORDER — HYDROXYZINE HYDROCHLORIDE 10 MG/1
10 TABLET, FILM COATED ORAL 3 TIMES DAILY PRN
Qty: 270 TABLET | Refills: 1 | Status: SHIPPED | OUTPATIENT
Start: 2024-11-04

## 2024-11-04 RX ORDER — VONOPRAZAN FUMARATE 26.72 MG/1
20 TABLET ORAL DAILY
Qty: 90 TABLET | Refills: 1 | Status: SHIPPED | OUTPATIENT
Start: 2024-11-04

## 2024-11-04 NOTE — TELEPHONE ENCOUNTER
"    Caller: Octavia Pendleton \"Alexandrea\"    Relationship: Self    Best call back number: [phone]    What medication are you requesting: PATIENT STATES THAT DR. CATES WAS TO PLACE HER ON MEDICATION TO REPLACE NEXIUM - THIS GOT SENT TO WRONG PHARMACY. PLEASE SEND TO [facility]     If a prescription is needed, what is your preferred pharmacy and phone number: [facility] - [place], [place] - [address] [phone] [facility] [phone]      Additional notes: PLEASE ADVISE PATIENT WHEN THIS HAS BEEN SENT.       "
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
## 2024-11-04 NOTE — TELEPHONE ENCOUNTER
Rx Refill Note  Requested Prescriptions     Pending Prescriptions Disp Refills    hydrOXYzine (ATARAX) 10 MG tablet [Pharmacy Med Name: hydrOXYzine HCL 10 MG TABLET] 270 tablet 1     Sig: TAKE ONE TABLET BY MOUTH THREE TIMES A DAY AS NEEDED FOR ANXIETY      Last office visit with prescribing clinician: 10/30/2024   Last telemedicine visit with prescribing clinician: Visit date not found   Next office visit with prescribing clinician: 12/11/2024                         Would you like a call back once the refill request has been completed: [] Yes [] No    If the office needs to give you a call back, can they leave a voicemail: [] Yes [] No    Kimberly Henriquez MA  11/04/24, 07:41 EST

## 2024-11-10 DIAGNOSIS — Z98.84 S/P GASTRIC BYPASS: ICD-10-CM

## 2024-11-11 RX ORDER — POTASSIUM CHLORIDE 1500 MG/1
20 TABLET, EXTENDED RELEASE ORAL 2 TIMES DAILY
Qty: 180 TABLET | Refills: 3 | Status: SHIPPED | OUTPATIENT
Start: 2024-11-11

## 2024-11-11 NOTE — TELEPHONE ENCOUNTER
Rx Refill Note  Requested Prescriptions     Pending Prescriptions Disp Refills    Klor-Con M20 20 MEQ CR tablet [Pharmacy Med Name: KLOR-CON M20 TABLET] 180 tablet 3     Sig: TAKE 1 TABLET BY MOUTH TWICE A DAY      Last office visit with prescribing clinician: 10/30/2024   Last telemedicine visit with prescribing clinician: Visit date not found   Next office visit with prescribing clinician: 12/11/2024                         Would you like a call back once the refill request has been completed: [] Yes [] No    If the office needs to give you a call back, can they leave a voicemail: [] Yes [] No    Kimberly Henriquez MA  11/11/24, 08:04 EST

## 2024-11-19 ENCOUNTER — INFUSION (OUTPATIENT)
Dept: ONCOLOGY | Facility: HOSPITAL | Age: 74
End: 2024-11-19
Payer: MEDICARE

## 2024-11-19 DIAGNOSIS — J45.40 MODERATE PERSISTENT ASTHMA, UNSPECIFIED WHETHER COMPLICATED: Primary | ICD-10-CM

## 2024-11-19 PROCEDURE — 96372 THER/PROPH/DIAG INJ SC/IM: CPT

## 2024-11-19 PROCEDURE — 25010000002 OMALIZUMAB 150 MG/ML SOLUTION PREFILLED SYRINGE: Performed by: ALLERGY & IMMUNOLOGY

## 2024-11-19 RX ADMIN — OMALIZUMAB 150 MG: 150 INJECTION, SOLUTION SUBCUTANEOUS at 09:27

## 2024-11-19 NOTE — NURSING NOTE
Patient tolerated xolair injections without difficulty. Declined post observation. Instructed to call her prescribing MD for any concerns prior to next appt. Discharged in stable condition.

## 2024-11-24 DIAGNOSIS — I10 ESSENTIAL HYPERTENSION: ICD-10-CM

## 2024-11-25 RX ORDER — VALSARTAN 160 MG/1
160 TABLET ORAL DAILY
Qty: 90 TABLET | Refills: 1 | Status: SHIPPED | OUTPATIENT
Start: 2024-11-25

## 2024-12-07 DIAGNOSIS — E03.9 HYPOTHYROIDISM, UNSPECIFIED TYPE: ICD-10-CM

## 2024-12-09 ENCOUNTER — OFFICE VISIT (OUTPATIENT)
Age: 74
End: 2024-12-09
Payer: MEDICARE

## 2024-12-09 VITALS
HEIGHT: 63 IN | DIASTOLIC BLOOD PRESSURE: 80 MMHG | HEART RATE: 82 BPM | SYSTOLIC BLOOD PRESSURE: 130 MMHG | BODY MASS INDEX: 38.16 KG/M2 | WEIGHT: 215.4 LBS

## 2024-12-09 DIAGNOSIS — I10 ESSENTIAL HYPERTENSION: ICD-10-CM

## 2024-12-09 DIAGNOSIS — Q24.5: Primary | ICD-10-CM

## 2024-12-09 PROCEDURE — 99214 OFFICE O/P EST MOD 30 MIN: CPT | Performed by: INTERNAL MEDICINE

## 2024-12-09 PROCEDURE — 1159F MED LIST DOCD IN RCRD: CPT | Performed by: INTERNAL MEDICINE

## 2024-12-09 PROCEDURE — 93000 ELECTROCARDIOGRAM COMPLETE: CPT | Performed by: INTERNAL MEDICINE

## 2024-12-09 PROCEDURE — 1160F RVW MEDS BY RX/DR IN RCRD: CPT | Performed by: INTERNAL MEDICINE

## 2024-12-09 RX ORDER — LEVOTHYROXINE SODIUM 50 UG/1
TABLET ORAL
Qty: 90 TABLET | Refills: 0 | Status: SHIPPED | OUTPATIENT
Start: 2024-12-09

## 2024-12-09 NOTE — TELEPHONE ENCOUNTER
Rx Refill Note  Requested Prescriptions     Pending Prescriptions Disp Refills    levothyroxine (SYNTHROID, LEVOTHROID) 50 MCG tablet [Pharmacy Med Name: LEVOTHYROXINE 50 MCG TABLET] 90 tablet 0     Sig: TAKE 1 TABLET BY MOUTH DAILY      Last office visit with prescribing clinician: 10/30/2024   Last telemedicine visit with prescribing clinician: Visit date not found   Next office visit with prescribing clinician: 12/11/2024                         Would you like a call back once the refill request has been completed: [] Yes [] No    If the office needs to give you a call back, can they leave a voicemail: [] Yes [] No    Kimberly Henriquez MA  12/09/24, 07:36 EST

## 2024-12-09 NOTE — PROGRESS NOTES
[facility] Follow Up Office Note     Encounter Date:24  Patient:Octavia Pendleton  :1950  MRN:[id number]      Chief Complaint:   Chief Complaint   Patient presents with    Anomalous left main coronary artery     6 month f/u     History of Presenting Illness:      Ms. Pendleton is 74 y.o. 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  Overall doing well was last seen about 6 months ago by nurse practitioner at that time having some shortness of breath did get a follow-up stress test at that time which was fairly bland.  I had ordered an echocardiogram few months earlier for somewhat atypical sharp chest pain which sounded more pericardial but again her echo was fine.  In general she is actually doing really well now had optimization of her blood pressure regimen by our nurse practitioner about 6 months ago which has helped.  Mainly bothered by her shortness of breath related to COPD.     Review of Systems:  Review of Systems   Constitutional: Positive for malaise/fatigue.   HENT: Negative.     Eyes: Negative.    Cardiovascular: Negative.    Respiratory:  Positive for shortness of breath.    Endocrine: Negative.    Hematologic/Lymphatic: Negative.    Skin: Negative.    Musculoskeletal: Negative.    Gastrointestinal: Negative.    Genitourinary: Negative.    Neurological: Negative.    Psychiatric/Behavioral: Negative.     Allergic/Immunologic: Negative.        Current Outpatient Medications on File Prior to Visit   Medication Sig Dispense Refill    albuterol (PROVENTIL HFA;VENTOLIN HFA) 108 (90 BASE) MCG/ACT inhaler Inhale 2 puffs Every 4 (Four) Hours As Needed for Wheezing.      aspirin 81 MG chewable tablet Chew 1 tablet Daily.      atorvastatin (LIPITOR) 10 MG tablet TAKE ONE TABLET BY MOUTH ONCE NIGHTLY  90 tablet 1    azelastine (ASTELIN) 0.1 % nasal spray 1 spray into the nostril(s) as directed by provider 2 (Two) Times a Day. Use in each nostril as directed 30 mL 2    azelastine (OPTIVAR) 0.05 % ophthalmic solution Administer 1 drop to both eyes 2 (Two) Times a Day. 6 each 3    cyanocobalamin 1000 MCG/ML injection Inject 1 mL under the skin into the appropriate area as directed Every 30 (Thirty) Days. 3 mL 3    EPINEPHrine (EPIPEN) 0.3 MG/0.3ML solution auto-injector injection       ferrous sulfate 325 (65 FE) MG tablet Take 1 tablet by mouth Daily With Breakfast. 90 tablet 3    fexofenadine (ALLEGRA) 180 MG tablet Take 1 tablet by mouth Daily.      fluticasone (FLONASE) 50 MCG/ACT nasal spray Administer 2 sprays into the nostril(s) as directed by provider Daily. Administer 2 sprays in each nostril for each dose.      Fluticasone-Umeclidin-Vilant (Trelegy Ellipta) 200-62.5-25 MCG/ACT inhaler Inhale 1 puff Daily.      hydroCHLOROthiazide 25 MG tablet Take 1 tablet by mouth Daily. 90 tablet 3    hydrOXYzine (ATARAX) 10 MG tablet TAKE ONE TABLET BY MOUTH THREE TIMES A DAY AS NEEDED FOR ANXIETY 270 tablet 1    Klor-Con M20 20 MEQ CR tablet TAKE 1 TABLET BY MOUTH TWICE A  tablet 3    levalbuterol (XOPENEX) 0.63 MG/3ML nebulizer solution Take 1 ampule by nebulization Every 4 (Four) Hours As Needed for Wheezing.      metaxalone (Skelaxin) 800 MG tablet Take 0.5-1 tablets by mouth 3 (Three) Times a Day As Needed for Muscle Spasms. 60 tablet 0    omalizumab (XOLAIR) 150 MG injection Inject 1.2 mL under the skin into the appropriate area as directed Every 30 (Thirty) Days.      Probiotic Product (FLORAJEN3) capsule Take 1 tablet by mouth daily.      thiamine (B-1) 100 MG/ML injection Inject 2 mL into the appropriate muscle as directed by prescriber Every 30 (Thirty) Days. 6 mL 3    valsartan (DIOVAN) 160 MG tablet TAKE 1 TABLET BY MOUTH DAILY 90 tablet 1    Vonoprazan Fumarate (Voquezna) 20 MG tablet Take 1 tablet  by mouth Daily. Indications: Nonerosive Gastroesophagel Reflux Disease 90 tablet 1    Vortioxetine HBr (Trintellix) 10 MG tablet tablet TAKE 1 TABLET BY MOUTH DAILY WITH BREAKFAST 90 tablet 1    [DISCONTINUED] levothyroxine (SYNTHROID, LEVOTHROID) 50 MCG tablet TAKE 1 TABLET BY MOUTH DAILY 90 tablet 0    levothyroxine (SYNTHROID, LEVOTHROID) 50 MCG tablet TAKE 1 TABLET BY MOUTH DAILY 90 tablet 0     Current Facility-Administered Medications on File Prior to Visit   Medication Dose Route Frequency Provider Last Rate Last Admin    cyanocobalamin injection 1,000 mcg  1,000 mcg Intramuscular Q28 Days Kehrer, Meredith Lea, MD   1,000 mcg at 03/16/21 1515    thiamine (B-1) injection 100 mg  100 mg Intramuscular Daily Deny Izquierdo APRN   100 mg at 03/07/24 1020    thiamine (B-1) injection 200 mg  200 mg Intramuscular Daily Kehrer, Meredith Lea, MD   200 mg at 09/26/22 1143    thiamine (B-1) injection 200 mg  200 mg Intramuscular Daily Kehrer, Meredith Lea, MD   200 mg at 03/15/23 1426       Allergies   Allergen Reactions    Codeine Hallucinations    Hydrocodone Hallucinations    Adhesive Tape Rash    Tape Rash       Past Medical History:   Diagnosis Date    Abnormal EEG     Acute upper respiratory infection     Allergic     Allergic conjunctivitis     Allergic rhinitis     Anemia     Anesthesia complication     HARD TO AWAKEN    Anxiety     Arthritis     Asthma     Atopic dermatitis     BMI 40.0-44.9, adult     Bronchitis     Cataract     Cervical neuritis     Cervical stenosis of spine     Cervicalgia     Cholelithiasis     Chronic obstructive asthma with acute exacerbation     Colon polyp 2022    Removed by surgeon    DDD (degenerative disc disease), lumbar     Depression     Disease of thyroid gland     Dizzinesses     Dysphagia     RESOLVED AFTER SURG    Encounter for screening colonoscopy 09/27/2022    Erosive esophagitis     Facet arthritis of cervical region     Generalized osteoarthritis of multiple sites      GERD (gastroesophageal reflux disease)     Hemangioma     History of esophageal stricture     HL (hearing loss)     Hypercholesteremia     Hyperlipidemia     Hypertension     Hypokalemia     Hypothyroidism     Influenza A with respiratory manifestations     Internal hemorrhoids     Intestinal malabsorption     Low back pain     Mixed hyperlipidemia     Multiple joint complaints     Need for prophylactic vaccination against Streptococcus pneumoniae (pneumococcus) and influenza     Neoplasm of uncertain behavior of skin     JAY on CPAP     NEW SLEEP TESTDONE SEPT, TRIAL OF NOT WEARING CPAP    Osteopenia of lumbar spine     Osteopenia of spine     Osteoporosis     Pain in arm     Panniculitis     PONV (postoperative nausea and vomiting)     Screening for malignant neoplasm of breast     Screening for malignant neoplasm of cervix     Sleep apnea     SOB (shortness of breath)     Stroke     Thiamine deficiency     TIA (transient ischemic attack)     Vitamin B deficiency     Vitamin B12 deficiency     Vitamin D deficiency     Wears glasses        Past Surgical History:   Procedure Laterality Date    ABDOMINOPLASTY N/A 11/01/2019    Procedure: ABDOMINOPLASTY;  Surgeon: Waterhouse, Maurine, MD;  [place] [facility] [place];  Service: Plastics    BARIATRIC SURGERY      CARDIAC CATHETERIZATION  10/2009    Abnormal blood vessels    CARPAL TUNNEL RELEASE Left     CERVICAL DISC SURGERY  04/21/2016    bones spurs removed as well    CERVICAL SPINE SURGERY  04/20/2017    CHOLECYSTECTOMY      CLOSED REDUCTION WRIST FRACTURE  09/02/2021    COLONOSCOPY      COLONOSCOPY N/A 11/22/2022    Procedure: COLONOSCOPY with polypectomy;  Surgeon: Agustín Rolon MD;  Location: [facility] [place];  Service: Gastroenterology;  Laterality: N/A;  hemorrhoids, polyp    DIAGNOSTIC LAPAROSCOPY      ELBOW PROCEDURE Left     release of nerve similar to carpal tunnel    ENDOSCOPY N/A 11/22/2022    Procedure: ESOPHAGOGASTRODUODENOSCOPY with dilation;   Surgeon: Agustín Rolon MD;  Location: [facility] [place];  Service: Gastroenterology;  Laterality: N/A;  gastric bypass, hiatal hernia, schatzki's ring, dilated to 20mm    EPIDURAL BLOCK      every 3 months.     EYE SURGERY Left 2000    CYST, CATARACT    FRACTURE SURGERY      GASTRIC BYPASS  10/2010        HAND SURGERY Right     HEMORRHOIDECTOMY      HYSTERECTOMY      INGUINAL HERNIA REPAIR Bilateral     JOINT REPLACEMENT      OOPHORECTOMY      PANNICULECTOMY N/A 11/01/2019    Procedure: PANNICULECTOMY;  Surgeon: Waterhouse, Maurine, MD;  Location: [facility] MAIN OR;  Service: Plastics    REPLACEMENT TOTAL KNEE BILATERAL      RIB FRACTURE SURGERY      1ST RIB REMOVED, DUE NUMBNESS IN ARM    ROTATOR CUFF REPAIR Bilateral     SPINE SURGERY         Social History     Socioeconomic History    Marital status:    Tobacco Use    Smoking status: Never    Smokeless tobacco: Never   Vaping Use    Vaping status: Never Used   Substance and Sexual Activity    Alcohol use: Yes     Alcohol/week: 1.0 standard drink of alcohol     Types: 1 Glasses of wine per week     Comment: Rarely    Drug use: No    Sexual activity: Not Currently     Partners: Male     Birth control/protection: Hysterectomy       Family History   Problem Relation Age of Onset    Arthritis Mother     Diabetes Mother     Heart disease Mother         Congestive heart failure    Vision loss Mother     Miscarriages / Stillbirths Mother     Vasculitis Father         Cerebral    Coronary artery disease Father     Early death Father     Hearing loss Father     Heart disease Father     Cancer Father     Stroke Father     Cancer Sister     Diabetes Sister     Hypertension Sister     Diabetes Sister     Diabetes Brother     Diabetes Brother     Cancer Brother         Melanoma    Breast cancer Paternal Aunt     Diabetes Other     Cancer Sister         Melanoma    Cancer Brother     Malig Hyperthermia Neg Hx     Colon cancer Neg Hx     Colon polyps Neg Hx      "Crohn's disease Neg Hx     Irritable bowel syndrome Neg Hx     Ulcerative colitis Neg Hx        The following portions of the patient's history were reviewed and updated as appropriate: allergies, current medications, past family history, past medical history, past social history, past surgical history and problem list.       Objective:       Vitals:    12/09/24 1224   BP: 130/80   BP Location: Left arm   Patient Position: Sitting   Pulse: 82   Weight: 97.7 kg (215 lb 6.4 oz)   Height: 160 cm (63\")       Body mass index is 38.16 kg/m².    Physical Exam:  Constitutional: Well appearing, Well-developed, No acute distress   HENT: Oropharynx clear and membrane moist  Eyes: Normal conjunctiva, no sclera icterus.  Neck: Supple, no carotid bruit bilaterally.  Cardiovascular: Regular rate and rhythm, No Murmur, No bilateral lower extremity edema.  Pulmonary: Normal respiratory effort, normal lung sounds, no wheezing.  Neurological: Alert and orient x 3.   Skin: Warm, dry, no ecchymosis, no rash.  Psych: Appropriate mood and affect. Normal judgment and insight.        Lab Results   Component Value Date     10/30/2024     06/12/2024    K 4.2 10/30/2024    K 4.0 06/12/2024     10/30/2024     06/12/2024    CO2 25 10/30/2024    CO2 27.5 06/12/2024    BUN 24 10/30/2024    BUN 18 06/12/2024    CREATININE 0.98 10/30/2024    CREATININE 0.87 06/12/2024    EGFRIFNONA 65 02/08/2022    EGFRIFNONA 67 09/17/2021    EGFRIFAFRI 74 02/08/2022    EGFRIFAFRI 81 09/17/2021    GLUCOSE 95 10/30/2024    GLUCOSE 81 06/12/2024    CALCIUM 9.3 10/30/2024    CALCIUM 9.2 06/12/2024    PROTENTOTREF 6.4 10/30/2024    PROTENTOTREF 6.2 04/24/2024    ALBUMIN 4.2 10/30/2024    ALBUMIN 4.0 04/24/2024    BILITOT 0.5 10/30/2024    BILITOT 0.4 04/24/2024    AST 20 10/30/2024    AST 18 04/24/2024    ALT 15 10/30/2024    ALT 13 04/24/2024     Lab Results   Component Value Date    WBC 8.6 10/30/2024    WBC 7.3 04/24/2024    HGB 13.7 " "10/30/2024    HGB 13.7 04/24/2024    HCT 43.2 10/30/2024    HCT 40.8 04/24/2024    MCV 92 10/30/2024    MCV 87 04/24/2024     10/30/2024     04/24/2024     Lab Results   Component Value Date    TRIG 97 10/30/2024    TRIG 149 04/24/2024    HDL 75 10/30/2024    HDL 72 04/24/2024    LDL 79 10/30/2024     (H) 04/24/2024     No results found for: \"PROBNP\", \"BNP\"  Lab Results   Component Value Date    CKTOTAL 106 03/16/2021     Lab Results   Component Value Date    TSH 0.731 10/30/2024    TSH 1.260 04/24/2024         ECG 12 Lead    Date/Time: 12/9/2024 1:14 PM  Performed by: Drew Thompson MD    Authorized by: Drew Thompson MD  Comparison: compared with previous ECG from 5/24/2024  Similar to previous ECG  Rhythm: sinus rhythm            Stress MPI 6/12/2024:  Myocardial perfusion imaging indicates a normal myocardial perfusion study with no evidence of ischemia. Impressions are consistent with a low risk study.  Left ventricular ejection fraction is hyperdynamic (Calculated EF > 70%).    Echocardiogram 11/13/2023:  Left ventricular systolic function is normal. Calculated left ventricular EF = 59.6% Normal left ventricular cavity size and wall thickness noted. All left ventricular wall segments contract normally. Left ventricular diastolic function was normal.  Mild mitral annular calcification is present. Trace mitral valve regurgitation is present.  Mild tricuspid valve regurgitation is present. Estimated right ventricular systolic pressure from tricuspid regurgitation is normal (<35 mmHg).      Cardiac CTA 10/20/2009 [facility]:  Anomalous left main coronary artery with origin from separate ostium of the right coronary cusp traversing and a retroaortic path and does not have an interarterial course.  No significant coronary artery disease noted throughout the coronary arteries.            Assessment:          Diagnosis Plan   1. Anomalous origin of left main coronary artery from right " coronary artery aortic sinus, with anomalous origin of right coronary artery from left main coronary artery aortic sinus  ECG 12 Lead      2. Essential hypertension  ECG 12 Lead             Plan:       Ms. Pendleton is 74 y.o. 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  Overall patient is doing well and no changes are needed we will see back in 6 months      Essential hypertension:  Started on valsartan seems to be doing better with blood pressure control     Anomalous left main coronary artery:  Patient has not left main arising from the right coronary cusp taking a posterior course and not intra-arterial and thus a benign course  Does not increase cardiovascular risk      Follow-up:  6 months    Thank you for allowing me to participate in the care of Octavia Pendleton. Feel free to contact me directly with any further questions or concerns.    Drew Thompson MD  [facility]  12/09/24  13:15 EST

## 2024-12-11 ENCOUNTER — OFFICE VISIT (OUTPATIENT)
Dept: FAMILY MEDICINE CLINIC | Facility: CLINIC | Age: 74
End: 2024-12-11
Payer: MEDICARE

## 2024-12-11 VITALS
SYSTOLIC BLOOD PRESSURE: 134 MMHG | DIASTOLIC BLOOD PRESSURE: 80 MMHG | HEART RATE: 76 BPM | TEMPERATURE: 98.4 F | BODY MASS INDEX: 37.99 KG/M2 | WEIGHT: 214.4 LBS | OXYGEN SATURATION: 96 % | HEIGHT: 63 IN

## 2024-12-11 DIAGNOSIS — E53.8 VITAMIN B12 DEFICIENCY: ICD-10-CM

## 2024-12-11 DIAGNOSIS — K21.9 GASTROESOPHAGEAL REFLUX DISEASE WITHOUT ESOPHAGITIS: Primary | ICD-10-CM

## 2024-12-11 DIAGNOSIS — Z98.84 S/P GASTRIC BYPASS: ICD-10-CM

## 2024-12-11 DIAGNOSIS — R73.03 PREDIABETES: ICD-10-CM

## 2024-12-11 PROCEDURE — 1160F RVW MEDS BY RX/DR IN RCRD: CPT | Performed by: FAMILY MEDICINE

## 2024-12-11 PROCEDURE — 1159F MED LIST DOCD IN RCRD: CPT | Performed by: FAMILY MEDICINE

## 2024-12-11 PROCEDURE — 99213 OFFICE O/P EST LOW 20 MIN: CPT | Performed by: FAMILY MEDICINE

## 2024-12-11 PROCEDURE — 1126F AMNT PAIN NOTED NONE PRSNT: CPT | Performed by: FAMILY MEDICINE

## 2024-12-17 ENCOUNTER — INFUSION (OUTPATIENT)
Dept: ONCOLOGY | Facility: HOSPITAL | Age: 74
End: 2024-12-17
Payer: MEDICARE

## 2024-12-17 DIAGNOSIS — J45.40 MODERATE PERSISTENT ASTHMA, UNSPECIFIED WHETHER COMPLICATED: Primary | ICD-10-CM

## 2024-12-17 PROCEDURE — 96372 THER/PROPH/DIAG INJ SC/IM: CPT

## 2024-12-17 PROCEDURE — 25010000002 OMALIZUMAB 150 MG/ML SOLUTION PREFILLED SYRINGE: Performed by: ALLERGY & IMMUNOLOGY

## 2024-12-17 RX ADMIN — OMALIZUMAB 150 MG: 150 INJECTION, SOLUTION SUBCUTANEOUS at 09:48

## 2024-12-17 NOTE — NURSING NOTE
Patient tolerated xolair injections without difficulty. Declined 30 minute post observation. Instructed to call her prescribing MD for any concerns prior to next appt. Discharged in stable condition.

## 2024-12-19 DIAGNOSIS — F33.41 RECURRENT MAJOR DEPRESSIVE DISORDER, IN PARTIAL REMISSION: ICD-10-CM

## 2024-12-19 RX ORDER — VORTIOXETINE 10 MG/1
10 TABLET, FILM COATED ORAL
Qty: 90 TABLET | Refills: 1 | Status: SHIPPED | OUTPATIENT
Start: 2024-12-19

## 2024-12-19 NOTE — TELEPHONE ENCOUNTER
Rx Refill Note  Requested Prescriptions     Pending Prescriptions Disp Refills    Trintellix 10 MG tablet tablet [Pharmacy Med Name: TRINTELLIX 10 MG TABLET] 90 tablet 1     Sig: TAKE 1 TABLET BY MOUTH DAILY WITH BREAKFAST      Last office visit with prescribing clinician: 12/11/2024   Last telemedicine visit with prescribing clinician: Visit date not found   Next office visit with prescribing clinician: 1/22/2025                         Would you like a call back once the refill request has been completed: [] Yes [] No    If the office needs to give you a call back, can they leave a voicemail: [] Yes [] No    Kimberly Galindo MA  12/19/24, 08:25 EST

## 2024-12-23 ENCOUNTER — CLINICAL SUPPORT (OUTPATIENT)
Dept: FAMILY MEDICINE CLINIC | Facility: CLINIC | Age: 74
End: 2024-12-23
Payer: MEDICARE

## 2024-12-23 DIAGNOSIS — E51.9 THIAMINE DEFICIENCY: Primary | ICD-10-CM

## 2024-12-23 PROCEDURE — 96372 THER/PROPH/DIAG INJ SC/IM: CPT | Performed by: FAMILY MEDICINE

## 2024-12-23 RX ADMIN — THIAMINE HYDROCHLORIDE 200 MG: 100 INJECTION, SOLUTION INTRAMUSCULAR; INTRAVENOUS at 09:50

## 2024-12-27 ENCOUNTER — OFFICE VISIT (OUTPATIENT)
Dept: GASTROENTEROLOGY | Facility: CLINIC | Age: 74
End: 2024-12-27
Payer: MEDICARE

## 2024-12-27 VITALS
SYSTOLIC BLOOD PRESSURE: 110 MMHG | BODY MASS INDEX: 37.44 KG/M2 | TEMPERATURE: 96.1 F | OXYGEN SATURATION: 96 % | HEART RATE: 76 BPM | HEIGHT: 63 IN | DIASTOLIC BLOOD PRESSURE: 70 MMHG | WEIGHT: 211.3 LBS

## 2024-12-27 DIAGNOSIS — K44.9 HIATAL HERNIA: Chronic | ICD-10-CM

## 2024-12-27 DIAGNOSIS — K22.2 SCHATZKI'S RING: ICD-10-CM

## 2024-12-27 DIAGNOSIS — Z86.0101 HX OF ADENOMATOUS COLONIC POLYPS: Chronic | ICD-10-CM

## 2024-12-27 DIAGNOSIS — K59.00 CONSTIPATION, UNSPECIFIED CONSTIPATION TYPE: Chronic | ICD-10-CM

## 2024-12-27 DIAGNOSIS — K64.9 HEMORRHOIDS, UNSPECIFIED HEMORRHOID TYPE: Chronic | ICD-10-CM

## 2024-12-27 DIAGNOSIS — R13.10 DYSPHAGIA, UNSPECIFIED TYPE: ICD-10-CM

## 2024-12-27 DIAGNOSIS — Z98.84 S/P GASTRIC BYPASS: Chronic | ICD-10-CM

## 2024-12-27 DIAGNOSIS — K21.9 GASTROESOPHAGEAL REFLUX DISEASE, UNSPECIFIED WHETHER ESOPHAGITIS PRESENT: Primary | Chronic | ICD-10-CM

## 2024-12-27 NOTE — PROGRESS NOTES
Chief Complaint   Patient presents with    Follow-up     GERD  Constipation    Med Management     Nexium         History of Present Illness  History of Present Illness  The patient presents for an annual follow-up.  She has history of GERD, gastric bypass surgery in 2008, 2 cm hiatal hernia,  Schatzki's ring s/p dilation, adenomatous colon polyps, anemia, constipation, and hemorrhoids s/p surgery many years ago.  She also reports a history of prior neck surgery.    She has been managing reflux with Nexium, taken twice daily, for several years. Recently, her primary care physician transitioned her to vonoprazan 20 mg daily. She has been on this new regimen for less than 2 months and reports it appears to be effective. She is unable to tolerate NSAIDs due to gastrointestinal upset but takes low-dose aspirin daily.     She experiences dysphagia, particularly with medications, which often become lodged in her throat. She also reports difficulty swallowing liquids. She has an upcoming appointment with an ENT specialist on Monday, where a scope examination is planned. She has previously undergone esophageal dilation, which provided relief. She has a history of neck spine surgery, which resulted in temporary vocal cord paralysis and loss of voice for several months. An injection was administered to restore vocal cord function, which was successful. However, she continues to experience intermittent hoarseness.    She has a history of constipation, which is currently well-managed with MiraLAX, taken as needed for 2 to 3 days. She does not take fiber supplements or Metamucil but maintains a high-fiber diet.    She has a history of hemorrhoids, which are not currently symptomatic. She underwent surgery for internal hemorrhoids a few years ago. She reports that the use of MiraLAX has significantly reduced her previous issue of bleeding.    She reports no family history of colon cancer.      Result Review :      Progress Notes  "by Gwendolyn Silvestre APRN (12/21/2023 09:15)     UPPER GI ENDOSCOPY (11/22/2022 08:24) GERD, gastric bypass anatomy, 2 cm hiatal hernia, Schatzki's ring s/p dilation  COLONOSCOPY (11/22/2022 08:23) internal hemorrhoids, adenomatous colon polyps.  5-year recall due 2027  Tissue Pathology Exam (11/22/2022 08:50)     Medication list reviewed        Review of Systems   Constitutional: Negative.    HENT: Negative.     Eyes: Negative.    Respiratory: Negative.     Cardiovascular: Negative.    Gastrointestinal: Negative.  Negative for constipation.        Dysphagia   Endocrine: Negative.    Genitourinary: Negative.    Musculoskeletal: Negative.    Skin: Negative.    Allergic/Immunologic: Negative.    Neurological: Negative.    Hematological: Negative.    Psychiatric/Behavioral: Negative.           Vital Signs:   /70   Pulse 76   Temp 96.1 °F (35.6 °C)   Ht 160 cm (62.99\")   Wt 95.8 kg (211 lb 4.8 oz)   SpO2 96%   BMI 37.44 kg/m²     Body mass index is 37.44 kg/m².     Physical Exam  Vitals reviewed.   Constitutional:       Appearance: Normal appearance.   HENT:      Head: Normocephalic.      Nose: Nose normal.   Eyes:      Pupils: Pupils are equal, round, and reactive to light.   Cardiovascular:      Rate and Rhythm: Normal rate.      Pulses: Normal pulses.   Pulmonary:      Effort: Pulmonary effort is normal.      Breath sounds: Normal breath sounds.   Abdominal:      General: Abdomen is flat. Bowel sounds are normal.      Palpations: Abdomen is soft.   Musculoskeletal:         General: Normal range of motion.      Cervical back: Normal range of motion.   Skin:     General: Skin is warm and dry.   Neurological:      General: No focal deficit present.      Mental Status: She is alert and oriented to person, place, and time.   Psychiatric:         Mood and Affect: Mood normal.             Assessment and Plan    Diagnoses and all orders for this visit:    1. Gastroesophageal reflux disease, unspecified whether " esophagitis present (Primary)    2. Dysphagia, unspecified type  -     FL ESOPHAGRAM DOUBLE CONTRAST; Future    3. Constipation, unspecified constipation type    4. Schatzki's ring  -     FL ESOPHAGRAM DOUBLE CONTRAST; Future    5. Hiatal hernia    6. Hemorrhoids, unspecified hemorrhoid type    7. Hx of adenomatous colonic polyps    8. S/P gastric bypass       Assessment & Plan  1. Colon polyps.  The patient had polyps removed during a colonoscopy 3 years ago. These polyps were identified as tubular adenomas, which carry a risk of becoming cancerous. A repeat colonoscopy is scheduled for 2027.    2. Gastroesophageal reflux disease.  She has been experiencing reflux and was previously on Nexium. Her primary care doctor switched her to vonoprazan 20 mg daily 2 months ago, which seems to be working well. She will continue this medication, and her primary care doctor will manage the refills.    3. Dysphagia.  She reports difficulty swallowing, with food and liquids getting stuck. She has an upcoming appointment with an ENT.  An esophagram has been ordered to evaluate the function and anatomy of her swallowing mechanism. Based on the results of the esophagram and the ENT's findings, a repeat upper endoscopy may be considered.  Also consider esophageal manometry.    4. Constipation.  She uses MiraLAX as needed for constipation, which has been effective. She is advised to try taking MiraLAX every other day to prevent constipation. She does not take fiber supplements but maintains a high-fiber diet.    5. Hemorrhoids.  She has a history of hemorrhoids and underwent surgery for internal hemorrhoids a few years ago. Currently, she is not experiencing any issues with hemorrhoids.      Return in about 6 months (around 6/27/2025).     Patient Instructions   GERD:  Continue Voquezna 20mg daily (refills from PCP)  We recommend avoiding eating 3-4 hours before bedtime, eating smaller more frequent meals, and avoiding any known food  triggers including spicy foods, tomatoes and tomato-based sauces, chocolate, coffee/tea, citrus fruits, carbonated  beverages and alcohol.     Dysphagia:  We will check an esophagram, based on results you may benefit from repeat EGD with esophageal dilation.   Appointment scheduled with ENT 12/30/2024    Constipation:  Continue Miralax, you could try taking it on a regular schedule like every other day  Continue high fiber diet       Next colonoscopy due November 2027 for colon cancer screening due to your history of colon polyps.          Patient or patient representative verbalized consent for the use of Ambient Listening during the visit with  JOAO Perez for chart documentation. 12/27/2024  09:23 EST          JOAO Johnson  [facility] [place] [address]  Office: [phone]

## 2024-12-27 NOTE — PATIENT INSTRUCTIONS
GERD:  Continue Voquezna 20mg daily (refills from PCP)  We recommend avoiding eating 3-4 hours before bedtime, eating smaller more frequent meals, and avoiding any known food triggers including spicy foods, tomatoes and tomato-based sauces, chocolate, coffee/tea, citrus fruits, carbonated  beverages and alcohol.     Dysphagia:  We will check an esophagram, based on results you may benefit from repeat EGD with esophageal dilation.   Appointment scheduled with ENT 12/30/2024    Constipation:  Continue Miralax, you could try taking it on a regular schedule like every other day  Continue high fiber diet       Next colonoscopy due November 2027 for colon cancer screening due to your history of colon polyps.

## 2025-01-14 ENCOUNTER — INFUSION (OUTPATIENT)
Dept: ONCOLOGY | Facility: HOSPITAL | Age: 75
End: 2025-01-14
Payer: MEDICARE

## 2025-01-14 DIAGNOSIS — J45.40 MODERATE PERSISTENT ASTHMA, UNSPECIFIED WHETHER COMPLICATED: Primary | ICD-10-CM

## 2025-01-14 PROCEDURE — 25010000002 OMALIZUMAB 150 MG/ML SOLUTION PREFILLED SYRINGE: Performed by: ALLERGY & IMMUNOLOGY

## 2025-01-14 PROCEDURE — 96372 THER/PROPH/DIAG INJ SC/IM: CPT

## 2025-01-14 RX ADMIN — OMALIZUMAB 150 MG: 150 INJECTION, SOLUTION SUBCUTANEOUS at 09:33

## 2025-01-20 ENCOUNTER — HOSPITAL ENCOUNTER (OUTPATIENT)
Dept: GENERAL RADIOLOGY | Facility: HOSPITAL | Age: 75
Discharge: HOME | End: 2025-01-20
Admitting: NURSE PRACTITIONER
Payer: MEDICARE

## 2025-01-20 DIAGNOSIS — R13.10 DYSPHAGIA, UNSPECIFIED TYPE: ICD-10-CM

## 2025-01-20 DIAGNOSIS — K22.2 SCHATZKI'S RING: ICD-10-CM

## 2025-01-20 PROCEDURE — 63710000001 BARIUM SULFATE 98 % RECONSTITUTED SUSPENSION: Performed by: NURSE PRACTITIONER

## 2025-01-20 PROCEDURE — 74221 X-RAY XM ESOPHAGUS 2CNTRST: CPT

## 2025-01-20 PROCEDURE — A9270 NON-COVERED ITEM OR SERVICE: HCPCS | Performed by: NURSE PRACTITIONER

## 2025-01-20 PROCEDURE — 63710000001 BARIUM SULFATE 96 % RECONSTITUTED SUSPENSION: Performed by: NURSE PRACTITIONER

## 2025-01-20 PROCEDURE — 63710000001 SOD BICARB-CITRIC ACID-SIMETHICONE 2.21-1.53-0.04 G PACK: Performed by: NURSE PRACTITIONER

## 2025-01-20 RX ADMIN — ANTACID/ANTIFLATULENT 1 PACKET: 380; 550; 10; 10 GRANULE, EFFERVESCENT ORAL at 10:23

## 2025-01-20 RX ADMIN — BARIUM SULFATE 183 ML: 960 POWDER, FOR SUSPENSION ORAL at 10:23

## 2025-01-20 RX ADMIN — BARIUM SULFATE 135 ML: 980 POWDER, FOR SUSPENSION ORAL at 10:23

## 2025-01-21 ENCOUNTER — PREP FOR SURGERY (OUTPATIENT)
Dept: SURGERY | Facility: SURGERY CENTER | Age: 75
End: 2025-01-21
Payer: MEDICARE

## 2025-01-21 DIAGNOSIS — R13.10 DYSPHAGIA, UNSPECIFIED TYPE: ICD-10-CM

## 2025-01-21 DIAGNOSIS — K21.9 GASTROESOPHAGEAL REFLUX DISEASE, UNSPECIFIED WHETHER ESOPHAGITIS PRESENT: ICD-10-CM

## 2025-01-21 DIAGNOSIS — K22.2 SCHATZKI'S RING: Primary | ICD-10-CM

## 2025-01-21 RX ORDER — SODIUM CHLORIDE 0.9 % (FLUSH) 0.9 %
10 SYRINGE (ML) INJECTION AS NEEDED
Status: CANCELLED | OUTPATIENT
Start: 2025-01-21

## 2025-01-21 RX ORDER — SODIUM CHLORIDE 0.9 % (FLUSH) 0.9 %
3 SYRINGE (ML) INJECTION EVERY 12 HOURS SCHEDULED
Status: CANCELLED | OUTPATIENT
Start: 2025-01-21

## 2025-01-22 ENCOUNTER — OFFICE VISIT (OUTPATIENT)
Dept: FAMILY MEDICINE CLINIC | Facility: CLINIC | Age: 75
End: 2025-01-22
Payer: MEDICARE

## 2025-01-22 VITALS
TEMPERATURE: 98.3 F | HEART RATE: 68 BPM | OXYGEN SATURATION: 97 % | WEIGHT: 207.2 LBS | DIASTOLIC BLOOD PRESSURE: 74 MMHG | SYSTOLIC BLOOD PRESSURE: 122 MMHG | BODY MASS INDEX: 36.71 KG/M2 | HEIGHT: 63 IN

## 2025-01-22 DIAGNOSIS — E78.49 OTHER HYPERLIPIDEMIA: ICD-10-CM

## 2025-01-22 DIAGNOSIS — I10 ESSENTIAL HYPERTENSION: Primary | ICD-10-CM

## 2025-01-22 DIAGNOSIS — K21.9 GASTROESOPHAGEAL REFLUX DISEASE WITHOUT ESOPHAGITIS: ICD-10-CM

## 2025-01-22 DIAGNOSIS — E51.9 THIAMINE DEFICIENCY: ICD-10-CM

## 2025-01-22 DIAGNOSIS — E53.8 VITAMIN B12 DEFICIENCY: ICD-10-CM

## 2025-01-22 DIAGNOSIS — M89.9 DISORDER OF BONE, UNSPECIFIED: ICD-10-CM

## 2025-01-22 DIAGNOSIS — R73.03 PREDIABETES: ICD-10-CM

## 2025-01-22 DIAGNOSIS — F33.41 RECURRENT MAJOR DEPRESSIVE DISORDER, IN PARTIAL REMISSION: ICD-10-CM

## 2025-01-22 DIAGNOSIS — E03.9 HYPOTHYROIDISM, UNSPECIFIED TYPE: ICD-10-CM

## 2025-01-22 DIAGNOSIS — Z98.84 S/P GASTRIC BYPASS: ICD-10-CM

## 2025-01-22 LAB
EXPIRATION DATE: NORMAL
HBA1C MFR BLD: 5.4 % (ref 4.5–5.7)
Lab: NORMAL

## 2025-01-22 PROCEDURE — 1126F AMNT PAIN NOTED NONE PRSNT: CPT | Performed by: FAMILY MEDICINE

## 2025-01-22 PROCEDURE — 99214 OFFICE O/P EST MOD 30 MIN: CPT | Performed by: FAMILY MEDICINE

## 2025-01-22 PROCEDURE — 83036 HEMOGLOBIN GLYCOSYLATED A1C: CPT | Performed by: FAMILY MEDICINE

## 2025-01-22 PROCEDURE — 3044F HG A1C LEVEL LT 7.0%: CPT | Performed by: FAMILY MEDICINE

## 2025-01-22 RX ORDER — VONOPRAZAN FUMARATE 13.36 MG/1
10 TABLET ORAL WEEKLY
Qty: 90 TABLET | Refills: 1 | Status: SHIPPED | OUTPATIENT
Start: 2025-01-22 | End: 2025-01-23 | Stop reason: SDUPTHER

## 2025-01-22 NOTE — PROGRESS NOTES
"Chief Complaint  Med Refill and Hyperlipidemia    Subjective        Octavia Pendleton presents to [facility] PRIMARY CARE  History of Present Illness  History of Present Illness  The patient is a 74-year-old female who presents for a routine follow-up on hypertension, elevated A1c, and other medical issues.    She has been experiencing significant stress over the past few weeks but reports no associated mood disturbances. She has not been engaging in stress eating, a behavior she typically resorts to during periods of high stress.    She has consulted with an ENT specialist regarding her vocal cords and is scheduled for a follow-up visit on 02/05/2025. The potential treatment plan includes either another injection or an implant to address her voice issues.    A recent swallow test conducted by her gastroenterologist yielded normal results. However, due to previous esophageal stretching performed a few years ago, an EGD has been recommended to evaluate the need for repeat procedure to improve her swallowing.    She reports persistent back pain, which she attributes to her sciatic nerve, as diagnosed by her orthopedic specialist. This pain radiates down her leg, significantly impairing her mobility. She receives injections every 3 months for this condition. Despite her pain, she maintains an active lifestyle, including yard work during the summer months. She has not yet incorporated any specific exercises into her routine.    MEDICATIONS  Current: Wegovy       Objective   Vital Signs:  /74   Pulse 68   Temp 98.3 °F (36.8 °C)   Ht 160 cm (63\")   Wt 94 kg (207 lb 3.2 oz)   SpO2 97%   BMI 36.70 kg/m²   Estimated body mass index is 36.7 kg/m² as calculated from the following:    Height as of this encounter: 160 cm (63\").    Weight as of this encounter: 94 kg (207 lb 3.2 oz).               Physical Exam   Physical Exam  Patient is alert and in no acute distress.  Forehead is healing from " chemotherapy application. Oral mucosa is moist. Tympanic membranes are clear bilaterally.  Lungs are clear.  Heart rhythm is regular.    Vital Signs  BMI is 36.       Result Review :          Results  Laboratory Studies  A1c is 5.4.              Assessment and Plan     Diagnoses and all orders for this visit:    1. Essential hypertension (Primary)  -     TSH  -     Lipid Panel  -     CBC & Differential  -     Comprehensive Metabolic Panel    2. Prediabetes  -     POC Glycosylated Hemoglobin (Hb A1C)    3. Recurrent major depressive disorder, in partial remission    4. S/P gastric bypass  -     Vitamin B12  -     TSH  -     Vitamin D,25-Hydroxy  -     Ferritin  -     Iron  -     Lipid Panel  -     CBC & Differential  -     Comprehensive Metabolic Panel  -     Folate  -     Vitamin B1, Whole Blood    5. Hypothyroidism, unspecified type  -     TSH    6. Thiamine deficiency  -     Vitamin B1, Whole Blood    7. Vitamin B12 deficiency    8. Other hyperlipidemia  -     Lipid Panel  -     Comprehensive Metabolic Panel    9. Gastroesophageal reflux disease without esophagitis  -     Vonoprazan Fumarate (Voquezna) 10 MG tablet; Take 1 tablet by mouth 1 (One) Time Per Week.  Dispense: 90 tablet; Refill: 1    10. Disorder of bone, unspecified  -     Vitamin D,25-Hydroxy      Assessment & Plan  1. Hypertension.  Her hypertension is currently well-managed. She will continue her current medication regimen.    2. Elevated A1c.  Her A1c has improved to 5.4, indicating she is no longer prediabetic. She will continue her current medication regimen.    3. Weight management.  She has lost 8 pounds since the last visit but still has a BMI of 36. She is encouraged to continue her weight loss efforts. A prescription for Wegovy 10 mg has been issued, which she will transition to upon completion of her current 20 mg supply.    4. Back pain.  She experiences chronic back pain, likely due to sciatica. She is advised to engage in chair yoga and  floor exercises as her condition permits.    5. Vocal cord issues.  She has seen an ENT specialist and is scheduled to see a specialist on 02/05/2025 to discuss further treatment options, including another injection or an implant.    6. Swallowing difficulties.  She recently had a swallow test, which was normal. An EGD has been recommended to assess if her esophagus needs to be stretched again.    Follow-up  The patient will follow up in 4 months.    PROCEDURE  The patient receives injections for back pain every 3 months.            Follow Up     Return in about 4 months (around 5/22/2025) for Recheck.  Patient was given instructions and counseling regarding her condition or for health maintenance advice. Please see specific information pulled into the AVS if appropriate.    Patient or patient representative verbalized consent for the use of Ambient Listening during the visit with  Meredith Lea Kehrer, MD for chart documentation. 1/22/2025  08:48 EST      Answers submitted by the patient for this visit:  Primary Reason for Visit (Submitted on 1/15/2025)  What is the primary reason for your visit?: Problem Not Listed

## 2025-01-23 ENCOUNTER — TELEPHONE (OUTPATIENT)
Dept: FAMILY MEDICINE CLINIC | Facility: CLINIC | Age: 75
End: 2025-01-23

## 2025-01-23 DIAGNOSIS — K21.9 GASTROESOPHAGEAL REFLUX DISEASE WITHOUT ESOPHAGITIS: ICD-10-CM

## 2025-01-23 LAB
25(OH)D3+25(OH)D2 SERPL-MCNC: 65.9 NG/ML (ref 30–100)
ALBUMIN SERPL-MCNC: 4.3 G/DL (ref 3.8–4.8)
ALP SERPL-CCNC: 82 IU/L (ref 44–121)
ALT SERPL-CCNC: 12 IU/L (ref 0–32)
AST SERPL-CCNC: 21 IU/L (ref 0–40)
BASOPHILS # BLD AUTO: 0.1 X10E3/UL (ref 0–0.2)
BASOPHILS NFR BLD AUTO: 1 %
BILIRUB SERPL-MCNC: 0.5 MG/DL (ref 0–1.2)
BUN SERPL-MCNC: 17 MG/DL (ref 8–27)
BUN/CREAT SERPL: 17 (ref 12–28)
CALCIUM SERPL-MCNC: 9.6 MG/DL (ref 8.7–10.3)
CHLORIDE SERPL-SCNC: 102 MMOL/L (ref 96–106)
CHOLEST SERPL-MCNC: 144 MG/DL (ref 100–199)
CO2 SERPL-SCNC: 27 MMOL/L (ref 20–29)
CREAT SERPL-MCNC: 1 MG/DL (ref 0.57–1)
EGFRCR SERPLBLD CKD-EPI 2021: 60 ML/MIN/1.73
EOSINOPHIL # BLD AUTO: 0.3 X10E3/UL (ref 0–0.4)
EOSINOPHIL NFR BLD AUTO: 3 %
ERYTHROCYTE [DISTWIDTH] IN BLOOD BY AUTOMATED COUNT: 12.7 % (ref 11.7–15.4)
FERRITIN SERPL-MCNC: 129 NG/ML (ref 15–150)
FOLATE SERPL-MCNC: 15.3 NG/ML
GLOBULIN SER CALC-MCNC: 2.3 G/DL (ref 1.5–4.5)
GLUCOSE SERPL-MCNC: 97 MG/DL (ref 70–99)
HCT VFR BLD AUTO: 45.5 % (ref 34–46.6)
HDLC SERPL-MCNC: 66 MG/DL
HGB BLD-MCNC: 14.8 G/DL (ref 11.1–15.9)
IMM GRANULOCYTES # BLD AUTO: 0 X10E3/UL (ref 0–0.1)
IMM GRANULOCYTES NFR BLD AUTO: 0 %
IRON SERPL-MCNC: 76 UG/DL (ref 27–139)
LDLC SERPL CALC-MCNC: 63 MG/DL (ref 0–99)
LYMPHOCYTES # BLD AUTO: 2.6 X10E3/UL (ref 0.7–3.1)
LYMPHOCYTES NFR BLD AUTO: 33 %
MCH RBC QN AUTO: 29.5 PG (ref 26.6–33)
MCHC RBC AUTO-ENTMCNC: 32.5 G/DL (ref 31.5–35.7)
MCV RBC AUTO: 91 FL (ref 79–97)
MONOCYTES # BLD AUTO: 1.2 X10E3/UL (ref 0.1–0.9)
MONOCYTES NFR BLD AUTO: 15 %
NEUTROPHILS # BLD AUTO: 3.7 X10E3/UL (ref 1.4–7)
NEUTROPHILS NFR BLD AUTO: 48 %
PLATELET # BLD AUTO: 263 X10E3/UL (ref 150–450)
POTASSIUM SERPL-SCNC: 4.2 MMOL/L (ref 3.5–5.2)
PROT SERPL-MCNC: 6.6 G/DL (ref 6–8.5)
RBC # BLD AUTO: 5.01 X10E6/UL (ref 3.77–5.28)
SODIUM SERPL-SCNC: 143 MMOL/L (ref 134–144)
SPECIMEN STATUS: NORMAL
TRIGL SERPL-MCNC: 76 MG/DL (ref 0–149)
TSH SERPL DL<=0.005 MIU/L-ACNC: 0.98 UIU/ML (ref 0.45–4.5)
VIT B1 BLD-SCNC: NORMAL NMOL/L
VIT B12 SERPL-MCNC: 552 PG/ML (ref 232–1245)
VLDLC SERPL CALC-MCNC: 15 MG/DL (ref 5–40)
WBC # BLD AUTO: 7.7 X10E3/UL (ref 3.4–10.8)

## 2025-01-23 RX ORDER — VONOPRAZAN FUMARATE 13.36 MG/1
10 TABLET ORAL DAILY
Qty: 90 TABLET | Refills: 1 | Status: SHIPPED | OUTPATIENT
Start: 2025-01-23

## 2025-01-23 NOTE — TELEPHONE ENCOUNTER
Pharmacy Name:  [facility] - [place], [place] - [address] - [phone] [facility] [phone]      Pharmacy representative name: ARAVIND     Pharmacy representative phone number: [phone]     What medication are you calling in regards to: Vonoprazan Fumarate (Voquezna) 10 MG tablet     What question does the pharmacy have: PHARMACY STATED THIS PRESCRIPTION CAME IN AS ONCE WEEKLY, HOWEVER THE PRESCRIPTION HISTORY SHOWS ONCE DAILY.    PHARMACY IS REQUESTING TO CLARIFY PRESCRIPTION DIRECTIONS.    PLEASE CALL TO DISCUSS.    Who is the provider that prescribed the medication: KEHRER

## 2025-01-23 NOTE — SIGNIFICANT NOTE
Education provided the Patient on the following:    - Nothing to Eat or Drink after MN the night before the procedure  -You will need to have someone drive you home after your EGD and remain with you for 24 hours after the EGD  - The date of your EGD, your are welcome to have one visitor at bedside or remain within 10-15 minutes of Advent Dighton  -Please wear warm socks when you arrive for your EGD  -Remove all jewelry and leave any valuables before arriving on the date of your procedure (all will have to be removed before leaving preop)  -You will need to arrive at 0630 on 1-27-25 for your EGD  -Feel free to contact us at: [phone] with any additional questions/concerns

## 2025-01-27 ENCOUNTER — ANESTHESIA EVENT (OUTPATIENT)
Dept: SURGERY | Facility: SURGERY CENTER | Age: 75
End: 2025-01-27
Payer: MEDICARE

## 2025-01-27 ENCOUNTER — HOSPITAL ENCOUNTER (OUTPATIENT)
Facility: SURGERY CENTER | Age: 75
Setting detail: HOSPITAL OUTPATIENT SURGERY
Discharge: HOME | End: 2025-01-27
Attending: INTERNAL MEDICINE | Admitting: INTERNAL MEDICINE
Payer: MEDICARE

## 2025-01-27 ENCOUNTER — ANESTHESIA (OUTPATIENT)
Dept: SURGERY | Facility: SURGERY CENTER | Age: 75
End: 2025-01-27
Payer: MEDICARE

## 2025-01-27 VITALS
OXYGEN SATURATION: 94 % | HEART RATE: 70 BPM | SYSTOLIC BLOOD PRESSURE: 114 MMHG | HEIGHT: 63 IN | TEMPERATURE: 97.7 F | WEIGHT: 209 LBS | DIASTOLIC BLOOD PRESSURE: 71 MMHG | RESPIRATION RATE: 16 BRPM | BODY MASS INDEX: 37.03 KG/M2

## 2025-01-27 DIAGNOSIS — K22.2 SCHATZKI'S RING: ICD-10-CM

## 2025-01-27 DIAGNOSIS — K21.9 GASTROESOPHAGEAL REFLUX DISEASE, UNSPECIFIED WHETHER ESOPHAGITIS PRESENT: ICD-10-CM

## 2025-01-27 DIAGNOSIS — R13.10 DYSPHAGIA, UNSPECIFIED TYPE: ICD-10-CM

## 2025-01-27 PROCEDURE — 43249 ESOPH EGD DILATION <30 MM: CPT | Performed by: INTERNAL MEDICINE

## 2025-01-27 PROCEDURE — 25010000002 PROPOFOL 10 MG/ML EMULSION: Performed by: REGISTERED NURSE

## 2025-01-27 PROCEDURE — 25010000002 LIDOCAINE 2% SOLUTION: Performed by: REGISTERED NURSE

## 2025-01-27 PROCEDURE — 25810000003 LACTATED RINGERS PER 1000 ML: Performed by: INTERNAL MEDICINE

## 2025-01-27 PROCEDURE — C1726 CATH, BAL DIL, NON-VASCULAR: HCPCS | Performed by: INTERNAL MEDICINE

## 2025-01-27 RX ORDER — ONDANSETRON 2 MG/ML
4 INJECTION INTRAMUSCULAR; INTRAVENOUS ONCE AS NEEDED
Status: DISCONTINUED | OUTPATIENT
Start: 2025-01-27 | End: 2025-01-27 | Stop reason: HOSPADM

## 2025-01-27 RX ORDER — SODIUM CHLORIDE 0.9 % (FLUSH) 0.9 %
10 SYRINGE (ML) INJECTION AS NEEDED
Status: DISCONTINUED | OUTPATIENT
Start: 2025-01-27 | End: 2025-01-27 | Stop reason: HOSPADM

## 2025-01-27 RX ORDER — SODIUM CHLORIDE, SODIUM LACTATE, POTASSIUM CHLORIDE, CALCIUM CHLORIDE 600; 310; 30; 20 MG/100ML; MG/100ML; MG/100ML; MG/100ML
30 INJECTION, SOLUTION INTRAVENOUS CONTINUOUS
Status: DISCONTINUED | OUTPATIENT
Start: 2025-01-27 | End: 2025-01-27 | Stop reason: HOSPADM

## 2025-01-27 RX ORDER — SODIUM CHLORIDE 0.9 % (FLUSH) 0.9 %
3 SYRINGE (ML) INJECTION EVERY 12 HOURS SCHEDULED
Status: DISCONTINUED | OUTPATIENT
Start: 2025-01-27 | End: 2025-01-27 | Stop reason: HOSPADM

## 2025-01-27 RX ORDER — PROPOFOL 10 MG/ML
VIAL (ML) INTRAVENOUS AS NEEDED
Status: DISCONTINUED | OUTPATIENT
Start: 2025-01-27 | End: 2025-01-27 | Stop reason: SURG

## 2025-01-27 RX ORDER — LIDOCAINE HYDROCHLORIDE 10 MG/ML
0.5 INJECTION, SOLUTION INFILTRATION; PERINEURAL ONCE AS NEEDED
Status: DISCONTINUED | OUTPATIENT
Start: 2025-01-27 | End: 2025-01-27 | Stop reason: HOSPADM

## 2025-01-27 RX ORDER — LIDOCAINE HYDROCHLORIDE 20 MG/ML
INJECTION, SOLUTION INFILTRATION; PERINEURAL AS NEEDED
Status: DISCONTINUED | OUTPATIENT
Start: 2025-01-27 | End: 2025-01-27 | Stop reason: SURG

## 2025-01-27 RX ADMIN — SODIUM CHLORIDE, POTASSIUM CHLORIDE, SODIUM LACTATE AND CALCIUM CHLORIDE 30 ML/HR: 600; 310; 30; 20 INJECTION, SOLUTION INTRAVENOUS at 07:02

## 2025-01-27 RX ADMIN — LIDOCAINE HYDROCHLORIDE 50 MG: 20 INJECTION, SOLUTION INFILTRATION; PERINEURAL at 07:37

## 2025-01-27 RX ADMIN — PROPOFOL 100 MG: 10 INJECTION, EMULSION INTRAVENOUS at 07:37

## 2025-01-27 RX ADMIN — PROPOFOL 160 MCG/KG/MIN: 10 INJECTION, EMULSION INTRAVENOUS at 07:38

## 2025-01-27 NOTE — ANESTHESIA POSTPROCEDURE EVALUATION
Patient: Octavia Pendleton    Procedure Summary       Date: 01/27/25 Room / Location: [facility]    Anesthesia Start: 0732 Anesthesia Stop: 0749    Procedure: ESOPHAGOGASTRODUODENOSCOPY with balloon dilation Diagnosis:       Schatzki's ring      Dysphagia, unspecified type      Gastroesophageal reflux disease, unspecified whether esophagitis present      (Schatzki's ring [K22.2])      (Dysphagia, unspecified type [R13.10])      (Gastroesophageal reflux disease, unspecified whether esophagitis present [K21.9])    Surgeons: Agustín Rolon MD Provider: William Hernandez MD    Anesthesia Type: MAC ASA Status: 3            Anesthesia Type: MAC    Vitals  Vitals Value Taken Time   BP 89/58 01/27/25 0755   Temp 36.5 °C (97.7 °F) 01/27/25 0749   Pulse 75 01/27/25 0755   Resp 16 01/27/25 0749   SpO2 92 % 01/27/25 0755   Vitals shown include unfiled device data.        Post Anesthesia Care and Evaluation    Level of consciousness: awake and alert  Pain management: adequate    Airway patency: patent  Anesthetic complications: No anesthetic complications  PONV Status: controlled  Cardiovascular status: acceptable  Respiratory status: acceptable  Hydration status: acceptable    Comments: Deemed appropriate for discharge from post anesthetic care

## 2025-01-27 NOTE — H&P
No chief complaint on file.      HPI  Patient today for an EGD due to GERD and dysphagia with history of Schatzki's ring.         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, with anomalous origin of right coronary artery from left main coronary artery aortic sinus    Schatzki's ring    Dysphagia       Medical History:    Past Medical History:   Diagnosis Date    Abnormal EEG     Acute upper respiratory infection     Allergic     Allergic conjunctivitis     Allergic rhinitis     Anemia     Anesthesia complication     HARD TO AWAKEN    Anxiety     Arthritis     Asthma     Atopic dermatitis     BMI 40.0-44.9, adult     Bronchitis     Cancer     Cataract     Cervical neuritis     Cervical stenosis of spine     Cervicalgia     Cholelithiasis     Chronic obstructive asthma with acute exacerbation     Colon polyp 2022    Removed by surgeon    DDD (degenerative disc disease), lumbar     Depression     Disease of thyroid gland     Dizzinesses     Dysphagia     RESOLVED AFTER SURG    Encounter for screening colonoscopy 09/27/2022    Erosive esophagitis     Facet arthritis of cervical region     Generalized osteoarthritis of multiple sites     GERD (gastroesophageal reflux disease)     Hemangioma     History of esophageal stricture     HL (hearing loss)     Hypercholesteremia     Hyperlipidemia     Hypertension     Hypokalemia     Hypothyroidism     Influenza A with respiratory manifestations     Internal hemorrhoids      Intestinal malabsorption     Low back pain     Mixed hyperlipidemia     Multiple joint complaints     Need for prophylactic vaccination against Streptococcus pneumoniae (pneumococcus) and influenza     Neoplasm of uncertain behavior of skin     JAY on CPAP     NEW SLEEP TESTDONE SEPT, TRIAL OF NOT WEARING CPAP    Osteopenia of lumbar spine     Osteopenia of spine     Osteoporosis     Pain in arm     Panniculitis     PONV (postoperative nausea and vomiting)     Screening for malignant neoplasm of breast     Screening for malignant neoplasm of cervix     Sleep apnea     SOB (shortness of breath)     Stroke     Thiamine deficiency     TIA (transient ischemic attack)     Vitamin B deficiency     Vitamin B12 deficiency     Vitamin D deficiency     Wears glasses         Social History:    Social History     Socioeconomic History    Marital status:    Tobacco Use    Smoking status: Never    Smokeless tobacco: Never   Vaping Use    Vaping status: Never Used   Substance and Sexual Activity    Alcohol use: Not Currently     Alcohol/week: 1.0 standard drink of alcohol     Comment: Rarely    Drug use: No    Sexual activity: Not Currently     Partners: Male     Birth control/protection: Hysterectomy       Family History:   Family History   Problem Relation Age of Onset    Arthritis Mother     Diabetes Mother     Heart disease Mother         Congestive heart failure    Vision loss Mother     Miscarriages / Stillbirths Mother     Vasculitis Father         Cerebral    Coronary artery disease Father     Early death Father     Hearing loss Father     Heart disease Father     Cancer Father     Stroke Father     Cancer Sister     Diabetes Sister     Hypertension Sister     Diabetes Sister     Diabetes Brother     Diabetes Brother     Cancer Brother         Melanoma    Breast cancer Paternal Aunt     Diabetes Other     Cancer Sister         Melanoma    Cancer Brother     Malig Hyperthermia Neg Hx     Colon cancer Neg Hx     Colon  polyps Neg Hx     Crohn's disease Neg Hx     Irritable bowel syndrome Neg Hx     Ulcerative colitis Neg Hx        Surgical History:   Past Surgical History:   Procedure Laterality Date    ABDOMINOPLASTY N/A 11/01/2019    Procedure: ABDOMINOPLASTY;  Surgeon: Waterhouse, Maurine, MD;  Location: [facility];  Service: Plastics    BARIATRIC SURGERY      CARDIAC CATHETERIZATION  10/2009    Abnormal blood vessels    CARPAL TUNNEL RELEASE Left     CERVICAL DISC SURGERY  04/21/2016    bones spurs removed as well    CERVICAL SPINE SURGERY  04/20/2017    CHOLECYSTECTOMY      CLOSED REDUCTION WRIST FRACTURE  09/02/2021    COLONOSCOPY      COLONOSCOPY N/A 11/22/2022    Procedure: COLONOSCOPY with polypectomy;  Surgeon: Agustín Rolon MD;  Location: [facility];  Service: Gastroenterology;  Laterality: N/A;  hemorrhoids, polyp    DIAGNOSTIC LAPAROSCOPY      ELBOW PROCEDURE Left     release of nerve similar to carpal tunnel    ENDOSCOPY N/A 11/22/2022    Procedure: ESOPHAGOGASTRODUODENOSCOPY with dilation;  Surgeon: Agustín Rolon MD;  Location: [facility];  Service: Gastroenterology;  Laterality: N/A;  gastric bypass, hiatal hernia, schatzki's ring, dilated to 20mm    EPIDURAL BLOCK      every 3 months.     EYE SURGERY Left 2000    CYST, CATARACT    FRACTURE SURGERY      GASTRIC BYPASS  10/2010        HAND SURGERY Right     HEMORRHOIDECTOMY      HYSTERECTOMY      INGUINAL HERNIA REPAIR Bilateral     JOINT REPLACEMENT      OOPHORECTOMY      PANNICULECTOMY N/A 11/01/2019    Procedure: PANNICULECTOMY;  Surgeon: Waterhouse, Maurine, MD;  Location: [facility];  Service: Plastics    REPLACEMENT TOTAL KNEE BILATERAL      RIB FRACTURE SURGERY      1ST RIB REMOVED, DUE NUMBNESS IN ARM    ROTATOR CUFF REPAIR Bilateral     SPINE SURGERY           Current Facility-Administered Medications:     lactated ringers infusion, 30 mL/hr, Intravenous, Continuous, Agustín Rolon MD, Last Rate: 30 mL/hr at  01/27/25 0702, 30 mL/hr at 01/27/25 0702    lidocaine (XYLOCAINE) 1 % injection 0.5 mL, 0.5 mL, Intradermal, Once PRN, Agustín Rolon MD    sodium chloride 0.9 % flush 10 mL, 10 mL, Intravenous, PRN, Kate Borrego APRN    sodium chloride 0.9 % flush 3 mL, 3 mL, Intravenous, Q12H, Kate Borrego APRN    Allergies:   Allergies   Allergen Reactions    Codeine Hallucinations    Hydrocodone Hallucinations    Adhesive Tape Rash    Tape Rash        The following portions of the patient's history were reviewed by me and updated as appropriate: review of systems, allergies, current medications, past family history, past medical history, past social history, past surgical history and problem list.    Vitals:    01/27/25 0658   BP: 127/82   Pulse: 76   Resp: 16   Temp: 97 °F (36.1 °C)   SpO2: 94%       PHYSICAL EXAM:    CONSTITUTIONAL:  today's vital signs reviewed by me  GASTROINTESTINAL: abdomen is soft nontender nondistended with normal active bowel sounds, no masses are appreciated    Assessment/ Plan  We will proceed today with EGD.    Risks and benefits as well as alternatives to endoscopic evaluation were explained to the patient and they voiced understanding and wish to proceed.  These risks include but are not limited to the risk of bleeding, perforation, adverse reaction to sedation, and missed lesions.  The patient was given the opportunity to ask questions prior to the endoscopic procedure.

## 2025-01-27 NOTE — ANESTHESIA PREPROCEDURE EVALUATION
Anesthesia Evaluation     Patient summary reviewed   history of anesthetic complications:  PONV  NPO Solid Status: > 8 hours  NPO Liquid Status: > 2 hours           Airway   Mallampati: I  TM distance: >3 FB  Neck ROM: limited  Dental      Comment: Denies any chipped, cracked, or loose teeth     Pulmonary - normal exam   (+) COPD, asthma,sleep apnea on CPAP  Cardiovascular - normal exam  Exercise tolerance: poor (<4 METS)    (+) hypertension, hyperlipidemia  (-) past MI, dysrhythmias, angina    ROS comment: Left main coming off the RCA    Echo 2024  Nuclear Perfusion Findings    Study Impression Myocardial perfusion imaging indicates a normal myocardial perfusion study with no evidence of ischemia. Impressions are consistent with a low risk study.  Rest Perfusion Defect 1 No rest myocardial perfusion defect noted.  Stress Perfusion Defect 1 No stress myocardial perfusion defect noted.  Ventricle Size / Description Left ventricular ejection fraction is hyperdynamic (Calculated EF > 70%). Normal LV cavity size. Normal LV wall motion noted. Normal RV cavity size.    Echo 2023  Interpretation Summary  ·  Left ventricular systolic function is normal. Calculated left ventricular EF = 59.6% Normal left ventricular cavity size and wall thickness noted. All left ventricular wall segments contract normally. Left ventricular diastolic function was normal.  ·  Mild mitral annular calcification is present. Trace mitral valve regurgitation is present.  ·  Mild tricuspid valve regurgitation is present. Estimated right ventricular systolic pressure from tricuspid regurgitation is normal (<35 mmHg).        Neuro/Psych  (+) TIA, CVA, headaches, psychiatric history Anxiety and Depression  GI/Hepatic/Renal/Endo    (+) obesity, GERD, PUD, diabetes mellitus (prediabetes), thyroid problem hypothyroidism    ROS Comment: S/p gastric bypass    Musculoskeletal     (+) neck pain (s/p neck fusion)  Abdominal    Substance History      OB/GYN           Other   arthritis,                   Anesthesia Plan    ASA 3     MAC       Anesthetic plan, risks, benefits, and alternatives have been provided, discussed and informed consent has been obtained with: patient and spouse/significant other.    Plan discussed with CRNA and attending.      CODE STATUS:

## 2025-01-31 LAB — VIT B1 BLD-SCNC: 119.8 NMOL/L (ref 66.5–200)

## 2025-02-01 LAB — VIT B1 BLD-SCNC: 102.6 NMOL/L (ref 66.5–200)

## 2025-02-04 ENCOUNTER — TELEPHONE (OUTPATIENT)
Dept: GASTROENTEROLOGY | Facility: CLINIC | Age: 75
End: 2025-02-04
Payer: MEDICARE

## 2025-02-04 NOTE — TELEPHONE ENCOUNTER
RN called and reviewed patient's recent endoscopic report with patient. Pt verbalized understanding and is agreeable. Pt had no further questions or concerns at this time. Pt reports that the Voquezna is working well. EL

## 2025-02-11 ENCOUNTER — INFUSION (OUTPATIENT)
Dept: ONCOLOGY | Facility: HOSPITAL | Age: 75
End: 2025-02-11
Payer: MEDICARE

## 2025-02-11 DIAGNOSIS — J45.40 MODERATE PERSISTENT ASTHMA, UNSPECIFIED WHETHER COMPLICATED: Primary | ICD-10-CM

## 2025-02-11 PROCEDURE — 96372 THER/PROPH/DIAG INJ SC/IM: CPT

## 2025-02-11 PROCEDURE — 25010000002 OMALIZUMAB 150 MG/ML SOLUTION PREFILLED SYRINGE: Performed by: ALLERGY & IMMUNOLOGY

## 2025-02-11 RX ADMIN — OMALIZUMAB 150 MG: 150 INJECTION, SOLUTION SUBCUTANEOUS at 10:11

## 2025-03-05 DIAGNOSIS — E03.9 HYPOTHYROIDISM, UNSPECIFIED TYPE: ICD-10-CM

## 2025-03-05 RX ORDER — LEVOTHYROXINE SODIUM 50 UG/1
TABLET ORAL
Qty: 90 TABLET | Refills: 0 | Status: SHIPPED | OUTPATIENT
Start: 2025-03-05

## 2025-03-05 NOTE — TELEPHONE ENCOUNTER
Rx Refill Note  Requested Prescriptions     Pending Prescriptions Disp Refills    levothyroxine (SYNTHROID, LEVOTHROID) 50 MCG tablet [Pharmacy Med Name: LEVOTHYROXINE 50 MCG TABLET] 90 tablet 0     Sig: TAKE 1 TABLET BY MOUTH DAILY      Last office visit with prescribing clinician: 1/22/2025   Last telemedicine visit with prescribing clinician: Visit date not found   Next office visit with prescribing clinician: 5/22/2025                         Would you like a call back once the refill request has been completed: [] Yes [] No    If the office needs to give you a call back, can they leave a voicemail: [] Yes [] No    Kimberly Henriquez MA  03/05/25, 07:54 EST

## 2025-03-11 ENCOUNTER — INFUSION (OUTPATIENT)
Dept: ONCOLOGY | Facility: HOSPITAL | Age: 75
End: 2025-03-11
Payer: MEDICARE

## 2025-03-11 DIAGNOSIS — J45.40 MODERATE PERSISTENT ASTHMA, UNSPECIFIED WHETHER COMPLICATED: Primary | ICD-10-CM

## 2025-03-11 PROCEDURE — 25010000002 OMALIZUMAB 150 MG/ML SOLUTION PREFILLED SYRINGE: Performed by: ALLERGY & IMMUNOLOGY

## 2025-03-11 PROCEDURE — 96372 THER/PROPH/DIAG INJ SC/IM: CPT

## 2025-03-11 RX ADMIN — OMALIZUMAB 150 MG: 150 INJECTION, SOLUTION SUBCUTANEOUS at 09:07

## 2025-03-26 ENCOUNTER — OFFICE VISIT (OUTPATIENT)
Dept: FAMILY MEDICINE CLINIC | Facility: CLINIC | Age: 75
End: 2025-03-26
Payer: MEDICARE

## 2025-03-26 VITALS
SYSTOLIC BLOOD PRESSURE: 106 MMHG | OXYGEN SATURATION: 96 % | HEART RATE: 83 BPM | DIASTOLIC BLOOD PRESSURE: 76 MMHG | WEIGHT: 198.8 LBS | HEIGHT: 64 IN | BODY MASS INDEX: 33.94 KG/M2

## 2025-03-26 DIAGNOSIS — M79.644 PAIN OF RIGHT THUMB: ICD-10-CM

## 2025-03-26 DIAGNOSIS — M25.531 RIGHT WRIST PAIN: Primary | ICD-10-CM

## 2025-03-26 PROCEDURE — 99213 OFFICE O/P EST LOW 20 MIN: CPT | Performed by: NURSE PRACTITIONER

## 2025-03-26 PROCEDURE — 1126F AMNT PAIN NOTED NONE PRSNT: CPT | Performed by: NURSE PRACTITIONER

## 2025-03-26 NOTE — PROGRESS NOTES
"Chief Complaint  Hand Injury (Sprained her left thumb hurt her shoulder as wrist. Told her at  it was sprained and to follow up with pcp. Ortho told her they would not see her for thumb and wrist problems. )    Subjective        Octavia Pendleton presents to [facility] PR[facility]  History of Present Illness    Patient is a patient of Dr. Meredith Kehrer.  She presents today with complaint of a sprain left thumb and wrist.  She did reach out to Ortho so they would not see where for the thumb and wrist issue.  They gave her thumb brace for comfort and  immobilization.  Recommended to apply ice as needed.  She fell on 3/17 and then went to  On 3/22.  Did xray of bilateral wrist and right thumb.      Swelling has come down so but still has throbbing pain and trouble with moving it.      Sees Isabel for ortho, but he doesn't see wrist or hand.      Hasn't been taking anything for pain.   Has been just using ice packs as needed.      Objective   Vital Signs:  /76   Pulse 83   Ht 161.3 cm (63.5\")   Wt 90.2 kg (198 lb 12.8 oz)   SpO2 96%   BMI 34.66 kg/m²   Estimated body mass index is 34.66 kg/m² as calculated from the following:    Height as of this encounter: 161.3 cm (63.5\").    Weight as of this encounter: 90.2 kg (198 lb 12.8 oz).            Physical Exam  Constitutional:       Appearance: Normal appearance.   Cardiovascular:      Rate and Rhythm: Normal rate and regular rhythm.      Pulses: Normal pulses.   Pulmonary:      Effort: Pulmonary effort is normal.      Breath sounds: Normal breath sounds.   Skin:     General: Skin is warm and dry.   Neurological:      Mental Status: She is alert.   Psychiatric:         Mood and Affect: Mood normal.         Behavior: Behavior normal.         Thought Content: Thought content normal.         Judgment: Judgment normal.        Result Review :                Assessment and Plan   Diagnoses and all orders for this visit:    1. Right wrist pain " (Primary)  -     Ambulatory Referral to Hand Surgery    2. Pain of right thumb  -     Ambulatory Referral to Hand Surgery    Declines pain medicine.  Use of brace recommended to continue.  Referral for hand surgery made.  Use of ice recommended 20-minute increments 4 times daily.  If any worsening pain notify provider.         Follow Up   No follow-ups on file.  Patient was given instructions and counseling regarding her condition or for health maintenance advice. Please see specific information pulled into the AVS if appropriate.

## 2025-04-08 ENCOUNTER — INFUSION (OUTPATIENT)
Dept: ONCOLOGY | Facility: HOSPITAL | Age: 75
End: 2025-04-08
Payer: MEDICARE

## 2025-04-08 DIAGNOSIS — J45.40 MODERATE PERSISTENT ASTHMA WITHOUT COMPLICATION: Primary | ICD-10-CM

## 2025-04-08 PROCEDURE — 25010000002 OMALIZUMAB 150 MG/ML SOLUTION PREFILLED SYRINGE: Performed by: ALLERGY & IMMUNOLOGY

## 2025-04-08 PROCEDURE — 96372 THER/PROPH/DIAG INJ SC/IM: CPT

## 2025-04-08 RX ADMIN — OMALIZUMAB 150 MG: 150 INJECTION, SOLUTION SUBCUTANEOUS at 09:41

## 2025-04-08 NOTE — NURSING NOTE
Injection  Xolair Injection performed in left arm by Lidia Chadwick RN. Patient tolerated the procedure well without complications.    04/08/25   Lidia Chadwick RN

## 2025-04-24 DIAGNOSIS — E78.49 OTHER HYPERLIPIDEMIA: ICD-10-CM

## 2025-04-24 RX ORDER — ATORVASTATIN CALCIUM 10 MG/1
10 TABLET, FILM COATED ORAL NIGHTLY
Qty: 90 TABLET | Refills: 1 | Status: SHIPPED | OUTPATIENT
Start: 2025-04-24

## 2025-04-24 NOTE — TELEPHONE ENCOUNTER
Rx Refill Note  Requested Prescriptions     Pending Prescriptions Disp Refills    atorvastatin (LIPITOR) 10 MG tablet [Pharmacy Med Name: ATORVASTATIN 10 MG TABLET] 90 tablet 1     Sig: TAKE ONE TABLET BY MOUTH ONCE NIGHTLY      Last office visit with prescribing clinician: 1/22/2025   Last telemedicine visit with prescribing clinician: Visit date not found   Next office visit with prescribing clinician: 5/22/2025                         Would you like a call back once the refill request has been completed: [] Yes [] No    If the office needs to give you a call back, can they leave a voicemail: [] Yes [] No    Kimberly Henriquez MA  04/24/25, 08:00 EDT

## 2025-04-30 ENCOUNTER — TELEPHONE (OUTPATIENT)
Dept: FAMILY MEDICINE CLINIC | Facility: CLINIC | Age: 75
End: 2025-04-30

## 2025-04-30 NOTE — TELEPHONE ENCOUNTER
"  Caller: Octavia Pendleton \"Alexandrea\"    Relationship to patient: Self    Best call back number:[phone]      Patient is needing: SHE IS CALLING TO SPEAK WITH THE M[employer].  REGARDING THE Vonoprazan Fumarate (Voquezna) 10 MG tablet  AND WORKERS COMP CLAIM        "

## 2025-04-30 NOTE — TELEPHONE ENCOUNTER
Spoke to pt she is stating that the Voquezna is used for a workers comp problem and that it replaces the Nexium and that you monitor the effects of that medication for them and have for the last several years.  Elisabet allergy and asthma started her on the Nexium over 25 yrs ago all of this is per the pt.

## 2025-04-30 NOTE — TELEPHONE ENCOUNTER
THEY ARE BUT IT NEEDS TO BE IN HER CHART SOMEWHERE THAT IT IS A WORKERS COMP MEDICATION.  CAN YOU ADD A NOTE TO THE MEDICATION THAT STATES WORKERS COMP MEDICATION.  IT WILL NOT LET ME

## 2025-05-01 DIAGNOSIS — F33.41 RECURRENT MAJOR DEPRESSIVE DISORDER, IN PARTIAL REMISSION: ICD-10-CM

## 2025-05-01 RX ORDER — HYDROXYZINE HYDROCHLORIDE 10 MG/1
10 TABLET, FILM COATED ORAL 3 TIMES DAILY PRN
Qty: 270 TABLET | Refills: 1 | Status: SHIPPED | OUTPATIENT
Start: 2025-05-01

## 2025-05-06 ENCOUNTER — INFUSION (OUTPATIENT)
Dept: ONCOLOGY | Facility: HOSPITAL | Age: 75
End: 2025-05-06
Payer: MEDICARE

## 2025-05-06 DIAGNOSIS — J45.40 MODERATE PERSISTENT ASTHMA WITHOUT COMPLICATION: Primary | ICD-10-CM

## 2025-05-06 PROCEDURE — 96372 THER/PROPH/DIAG INJ SC/IM: CPT

## 2025-05-06 PROCEDURE — 25010000002: Performed by: ALLERGY & IMMUNOLOGY

## 2025-05-06 NOTE — NURSING NOTE
Patient tolerated xolair injections without difficulty, and pt declined to be monitored for 30 minutes post injection. Instructed to call her prescribing MD for any concerns or developing symptoms prior to next appt. F/U appt reviewed with pt. Discharged per ambulation in stable condition.

## 2025-05-22 ENCOUNTER — OFFICE VISIT (OUTPATIENT)
Dept: FAMILY MEDICINE CLINIC | Facility: CLINIC | Age: 75
End: 2025-05-22
Payer: MEDICARE

## 2025-05-22 VITALS
TEMPERATURE: 98.3 F | HEIGHT: 64 IN | OXYGEN SATURATION: 99 % | HEART RATE: 63 BPM | WEIGHT: 193 LBS | DIASTOLIC BLOOD PRESSURE: 72 MMHG | BODY MASS INDEX: 32.95 KG/M2 | SYSTOLIC BLOOD PRESSURE: 116 MMHG

## 2025-05-22 DIAGNOSIS — F33.41 RECURRENT MAJOR DEPRESSIVE DISORDER, IN PARTIAL REMISSION: ICD-10-CM

## 2025-05-22 DIAGNOSIS — M25.531 RIGHT WRIST PAIN: ICD-10-CM

## 2025-05-22 DIAGNOSIS — I10 ESSENTIAL HYPERTENSION: ICD-10-CM

## 2025-05-22 DIAGNOSIS — E03.9 HYPOTHYROIDISM, UNSPECIFIED TYPE: ICD-10-CM

## 2025-05-22 DIAGNOSIS — K21.9 GASTROESOPHAGEAL REFLUX DISEASE WITHOUT ESOPHAGITIS: ICD-10-CM

## 2025-05-22 DIAGNOSIS — Z98.84 S/P GASTRIC BYPASS: ICD-10-CM

## 2025-05-22 DIAGNOSIS — E78.49 OTHER HYPERLIPIDEMIA: ICD-10-CM

## 2025-05-22 DIAGNOSIS — I10 ESSENTIAL HYPERTENSION: Primary | ICD-10-CM

## 2025-05-22 DIAGNOSIS — R73.03 PREDIABETES: ICD-10-CM

## 2025-05-22 DIAGNOSIS — M79.644 PAIN OF RIGHT THUMB: ICD-10-CM

## 2025-05-22 PROCEDURE — 99214 OFFICE O/P EST MOD 30 MIN: CPT | Performed by: FAMILY MEDICINE

## 2025-05-22 PROCEDURE — 1126F AMNT PAIN NOTED NONE PRSNT: CPT | Performed by: FAMILY MEDICINE

## 2025-05-22 RX ORDER — VALSARTAN 160 MG/1
160 TABLET ORAL DAILY
Qty: 90 TABLET | Refills: 1 | Status: SHIPPED | OUTPATIENT
Start: 2025-05-22

## 2025-05-22 RX ORDER — ESOMEPRAZOLE MAGNESIUM 40 MG/1
40 CAPSULE, DELAYED RELEASE ORAL 2 TIMES DAILY
Qty: 180 CAPSULE | Refills: 3 | Status: SHIPPED | OUTPATIENT
Start: 2025-05-22

## 2025-05-22 NOTE — PROGRESS NOTES
Chief Complaint  Med Refill, Hypertension, Depression, Hypothyroidism, and Hyperlipidemia    Subjective        Octavia Pendleton presents to [facility]  Hypertension  Chronicity:  Chronic  Onset:  More than 1 year ago  Progression since onset:  Improved  Condition status:  Controlled  Associated symptoms: anxiety and malaise/fatigue    Agents associated with hypertension:  No associated agents and thyroid hormones  Compliance problems:  No compliance problems    History of Present Illness  The patient is a 74-year-old female who presents for a routine follow-up on hypertension, hypothyroidism, and multiple other medical problems.    She reports experiencing lightheadedness, imbalance, and tremors yesterday, which have raised concerns about potential hypotension. She did not monitor her blood pressure during these episodes but notes that her readings have been consistently low. During a recent hospitalization for throat surgery several weeks ago, her blood pressure was reported as significantly low, leading to a temporary cessation of her antihypertensive medication.    She has been unable to refill her Quinsair prescription due to insurance issues and has reverted to Nexium 40 mg twice daily. She reports experiencing chest pain this week, which she attributes to stress from family circumstances.    She has been experiencing persistent pain in her hand following a fall. Initially, she believed the condition was improving and canceled her appointment with a hand specialist. However, the pain has since worsened, prompting her to reschedule the appointment for the end of 06/2025. The pain is localized to her thumb, which remains swollen, and has now spread to her hand, causing cramping. She has been applying Salonpas patches, which provide temporary relief after an initial period of increased pain lasting approximately 20 minutes. She has also found some relief from icing the area. She has  "attempted to use a thumb splint provided by urgent care but does not wear it during sleep. She has not yet tried Voltaren gel.    She had an implant placed in her vocal cord but it did not immediately fix her voice issue. She is undergoing voice therapy and may receive another injection on 08/01/2025. The injections have previously worked for a month or two, but they need to be repeated.    She has been losing weight and is working on it. She is not sure what she weighed last time she was in. She is down 16 pounds since January and 5 pounds since March.    PAST SURGICAL HISTORY:  Throat surgery: MM/YYYY  Implant in vocal cord: MM/YYYY       Objective   Vital Signs:  /72   Pulse 63   Temp 98.3 °F (36.8 °C)   Ht 161.3 cm (63.5\")   Wt 87.5 kg (193 lb)   SpO2 99%   BMI 33.65 kg/m²   Estimated body mass index is 33.65 kg/m² as calculated from the following:    Height as of this encounter: 161.3 cm (63.5\").    Weight as of this encounter: 87.5 kg (193 lb).               Physical Exam   Physical Exam  Respiratory: Clear to auscultation, no wheezing, rales or rhonchi  Cardiovascular: Regular rate and rhythm, no murmurs, rubs, or gallops       Result Review :          Results                Assessment and Plan     Diagnoses and all orders for this visit:    1. Essential hypertension (Primary)  -     CBC & Differential  -     Comprehensive Metabolic Panel  -     TSH    2. Hypothyroidism, unspecified type  -     TSH    3. Prediabetes    4. Other hyperlipidemia    5. Recurrent major depressive disorder, in partial remission    6. S/P gastric bypass    7. Gastroesophageal reflux disease without esophagitis  -     esomeprazole (nexIUM) 40 MG capsule; Take 1 capsule by mouth 2 (Two) Times a Day.  Dispense: 180 capsule; Refill: 3    8. Pain of right thumb  -     Diclofenac Sodium (Voltaren) 1 % gel gel; Apply 2 g topically to the appropriate area as directed 4 (Four) Times a Day As Needed (pain).  Dispense: 350 g; " Refill: 0    9. Right wrist pain  -     Diclofenac Sodium (Voltaren) 1 % gel gel; Apply 2 g topically to the appropriate area as directed 4 (Four) Times a Day As Needed (pain).  Dispense: 350 g; Refill: 0      Assessment & Plan  1. Hypertension.  - Blood pressure readings have been consistently low, with today's reading at 116/72.  - She reported experiencing lightheadedness and off-balance sensations, which may be related to her current antihypertensive regimen.  - The diuretic component of her treatment will be discontinued, leaving her on Diovan alone.  - She is advised to monitor her blood pressure closely and report any readings exceeding 150/90.    2. Hand pain.  - Significant pain and swelling in the thumb and hand, which has worsened over time.  - She has been using Salonpas and icing the area, which provides some relief.  - A prescription for diclofenac gel has been issued to manage the pain.  - She is advised to apply ice for 20 minutes three times a day and to try sleeping with a thumb splint to prevent nocturnal movements that may exacerbate the pain.    3. Chest pain.  - She reported experiencing chest pain, which she attributes to stress from recent family events.  - She has resumed taking Nexium 40 mg twice a day due to issues with her Quinsair prescription.  - A prescription for Nexium 40 mg twice a day has been sent to her pharmacy.  - Heart is regular and lungs are clear upon examination.    4. Hypothyroidism.  - Thyroid function tests will be conducted today to monitor her condition.  - Basic labs will be checked today, including thyroid function and chemistry panel.  - Her cholesterol levels were excellent during the last assessment.  - A comprehensive lab panel will be conducted during her Medicare wellness visit.    5. Prediabetes.  - Her A1c levels will be checked today to monitor her prediabetic status.  - Previous cholesterol levels were excellent.  - If any abnormalities are detected in her  blood count, such as anemia, additional vitamin level tests will be ordered.  - Encouraged to continue dietary changes and weight loss efforts.    6. Weight management.  - She has lost 16 pounds since January and 5 pounds since March, indicating progress in weight management.  - Encouraged to continue her current dietary changes and weight loss efforts.  - Scheduled for Medicare wellness visit at the beginning of November or the very end of October.  - Blood pressure is excellent today, 116/72.    7. Vocal cord implant.  - She reported that the implant in her vocal cord has not immediately fixed her voice issue.  - Voice therapy has been recommended, and another injection may be considered during her follow-up visit on 08/01/2025.  - Previous injections provided temporary relief, lasting only a month or two.  - Continued monitoring and follow-up with the specialist are planned.            Follow Up     Return for Next scheduled follow up.  Patient was given instructions and counseling regarding her condition or for health maintenance advice. Please see specific information pulled into the AVS if appropriate.    Patient or patient representative verbalized consent for the use of Ambient Listening during the visit with  Meredith Lea Kehrer, MD for chart documentation. 5/22/2025  14:26 EDT

## 2025-05-23 LAB
ALBUMIN SERPL-MCNC: 4.2 G/DL (ref 3.5–5.2)
ALBUMIN/GLOB SERPL: 1.9 G/DL
ALP SERPL-CCNC: 102 U/L (ref 39–117)
ALT SERPL-CCNC: 13 U/L (ref 1–33)
AST SERPL-CCNC: 20 U/L (ref 1–32)
BASOPHILS # BLD AUTO: 0.07 10*3/MM3 (ref 0–0.2)
BASOPHILS NFR BLD AUTO: 0.9 % (ref 0–1.5)
BILIRUB SERPL-MCNC: 0.7 MG/DL (ref 0–1.2)
BUN SERPL-MCNC: 17 MG/DL (ref 8–23)
BUN/CREAT SERPL: 18.9 (ref 7–25)
CALCIUM SERPL-MCNC: 9.5 MG/DL (ref 8.6–10.5)
CHLORIDE SERPL-SCNC: 104 MMOL/L (ref 98–107)
CO2 SERPL-SCNC: 26.7 MMOL/L (ref 22–29)
CREAT SERPL-MCNC: 0.9 MG/DL (ref 0.57–1)
EGFRCR SERPLBLD CKD-EPI 2021: 68.1 ML/MIN/1.73
EOSINOPHIL # BLD AUTO: 0.29 10*3/MM3 (ref 0–0.4)
EOSINOPHIL NFR BLD AUTO: 3.6 % (ref 0.3–6.2)
ERYTHROCYTE [DISTWIDTH] IN BLOOD BY AUTOMATED COUNT: 13.2 % (ref 12.3–15.4)
GLOBULIN SER CALC-MCNC: 2.2 GM/DL
GLUCOSE SERPL-MCNC: 91 MG/DL (ref 65–99)
HCT VFR BLD AUTO: 40.7 % (ref 34–46.6)
HGB BLD-MCNC: 13.6 G/DL (ref 12–15.9)
IMM GRANULOCYTES # BLD AUTO: 0.02 10*3/MM3 (ref 0–0.05)
IMM GRANULOCYTES NFR BLD AUTO: 0.3 % (ref 0–0.5)
LYMPHOCYTES # BLD AUTO: 2.92 10*3/MM3 (ref 0.7–3.1)
LYMPHOCYTES NFR BLD AUTO: 36.6 % (ref 19.6–45.3)
MCH RBC QN AUTO: 29.2 PG (ref 26.6–33)
MCHC RBC AUTO-ENTMCNC: 33.4 G/DL (ref 31.5–35.7)
MCV RBC AUTO: 87.3 FL (ref 79–97)
MONOCYTES # BLD AUTO: 1.03 10*3/MM3 (ref 0.1–0.9)
MONOCYTES NFR BLD AUTO: 12.9 % (ref 5–12)
NEUTROPHILS # BLD AUTO: 3.65 10*3/MM3 (ref 1.7–7)
NEUTROPHILS NFR BLD AUTO: 45.7 % (ref 42.7–76)
NRBC BLD AUTO-RTO: 0 /100 WBC (ref 0–0.2)
PLATELET # BLD AUTO: 267 10*3/MM3 (ref 140–450)
POTASSIUM SERPL-SCNC: 4.1 MMOL/L (ref 3.5–5.2)
PROT SERPL-MCNC: 6.4 G/DL (ref 6–8.5)
RBC # BLD AUTO: 4.66 10*6/MM3 (ref 3.77–5.28)
SODIUM SERPL-SCNC: 142 MMOL/L (ref 136–145)
TSH SERPL DL<=0.005 MIU/L-ACNC: 0.76 UIU/ML (ref 0.27–4.2)
WBC # BLD AUTO: 7.98 10*3/MM3 (ref 3.4–10.8)

## 2025-05-31 DIAGNOSIS — E03.9 HYPOTHYROIDISM, UNSPECIFIED TYPE: ICD-10-CM

## 2025-06-02 RX ORDER — LEVOTHYROXINE SODIUM 50 UG/1
50 TABLET ORAL DAILY
Qty: 90 TABLET | Refills: 1 | Status: SHIPPED | OUTPATIENT
Start: 2025-06-02

## 2025-06-02 NOTE — TELEPHONE ENCOUNTER
Rx Refill Note  Requested Prescriptions     Pending Prescriptions Disp Refills    levothyroxine (SYNTHROID, LEVOTHROID) 50 MCG tablet [Pharmacy Med Name: LEVOTHYROXINE 50 MCG TABLET] 90 tablet 1     Sig: TAKE 1 TABLET BY MOUTH DAILY      Last office visit with prescribing clinician: 5/22/2025   Last telemedicine visit with prescribing clinician: Visit date not found   Next office visit with prescribing clinician: 11/13/2025                         Would you like a call back once the refill request has been completed: [] Yes [] No    If the office needs to give you a call back, can they leave a voicemail: [] Yes [] No    Kimberly Henriquez MA  06/02/25, 07:46 EDT

## 2025-06-03 ENCOUNTER — INFUSION (OUTPATIENT)
Dept: ONCOLOGY | Facility: HOSPITAL | Age: 75
End: 2025-06-03
Payer: MEDICARE

## 2025-06-03 ENCOUNTER — CLINICAL SUPPORT (OUTPATIENT)
Dept: FAMILY MEDICINE CLINIC | Facility: CLINIC | Age: 75
End: 2025-06-03
Payer: MEDICARE

## 2025-06-03 VITALS — RESPIRATION RATE: 16 BRPM | TEMPERATURE: 97.3 F

## 2025-06-03 DIAGNOSIS — J45.40 MODERATE PERSISTENT ASTHMA WITHOUT COMPLICATION: Primary | ICD-10-CM

## 2025-06-03 DIAGNOSIS — E51.9 THIAMINE DEFICIENCY: Primary | ICD-10-CM

## 2025-06-03 PROCEDURE — 25010000002 OMALIZUMAB 150 MG/ML SOLUTION PREFILLED SYRINGE: Performed by: ALLERGY & IMMUNOLOGY

## 2025-06-03 PROCEDURE — 96372 THER/PROPH/DIAG INJ SC/IM: CPT

## 2025-06-03 RX ORDER — THIAMINE HYDROCHLORIDE 100 MG/ML
100 INJECTION, SOLUTION INTRAMUSCULAR; INTRAVENOUS ONCE
Status: COMPLETED | OUTPATIENT
Start: 2025-06-03 | End: 2025-06-03

## 2025-06-03 RX ADMIN — OMALIZUMAB 150 MG: 150 INJECTION, SOLUTION SUBCUTANEOUS at 09:26

## 2025-06-03 RX ADMIN — THIAMINE HYDROCHLORIDE 100 MG: 100 INJECTION, SOLUTION INTRAMUSCULAR; INTRAVENOUS at 14:21

## 2025-06-03 NOTE — NURSING NOTE
Patient tolerated xolair injection without difficulty and declined 30 minutes post injection observation. Instructed to call her prescribing MD for any concerns prior to next appt.  Pt verbalized understanding and discharged in stable condition.    
06-Feb-2023 10:34

## 2025-06-10 ENCOUNTER — OFFICE VISIT (OUTPATIENT)
Age: 75
End: 2025-06-10
Payer: MEDICARE

## 2025-06-10 VITALS
HEIGHT: 63 IN | DIASTOLIC BLOOD PRESSURE: 74 MMHG | BODY MASS INDEX: 34.2 KG/M2 | WEIGHT: 193 LBS | HEART RATE: 85 BPM | SYSTOLIC BLOOD PRESSURE: 122 MMHG

## 2025-06-10 DIAGNOSIS — E78.5 HYPERLIPIDEMIA, UNSPECIFIED HYPERLIPIDEMIA TYPE: Primary | ICD-10-CM

## 2025-06-10 PROCEDURE — 1159F MED LIST DOCD IN RCRD: CPT | Performed by: NURSE PRACTITIONER

## 2025-06-10 PROCEDURE — 1160F RVW MEDS BY RX/DR IN RCRD: CPT | Performed by: NURSE PRACTITIONER

## 2025-06-10 PROCEDURE — 99214 OFFICE O/P EST MOD 30 MIN: CPT | Performed by: NURSE PRACTITIONER

## 2025-06-10 PROCEDURE — 93000 ELECTROCARDIOGRAM COMPLETE: CPT | Performed by: NURSE PRACTITIONER

## 2025-06-10 NOTE — PROGRESS NOTES
Subjective:     Encounter Date:06/10/2025      Patient ID: cOtavia Pendleton is a 74 y.o. female.    Chief Complaint: Follow-up anomalous left main coronary artery  History of Present Illness  This is a 74-year-old female who follows with Dr. Thompson and is new to me today.  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  She was last seen by Dr. Thompson in December 2024 and was doing well at that time.  No changes were made.    She is here today for follow-up visit.  From a cardiac standpoint she has been feeling okay.  She has lost about 22 pounds since she was last seen and was having some issues with her blood pressure being on the low side.  Her PCP stopped hydrochlorothiazide and her blood pressure has been good since.  She recently underwent vocal cord implant with her ENT.  She had paralyzed vocal cords from a surgery that she had a while ago and had been receiving injections.  They were hoping that the implant would eliminate the need for the injections.  Unfortunately the implant did not work and now she has no voice.  She is planning to have another injection which they feel might help with the situation.    I have reviewed and updated as appropriate allergies, current medications, past family history, past medical history, past surgical history and problem list.    Review of Systems   Constitutional: Negative for fever, malaise/fatigue, weight gain and weight loss.   HENT:  Negative for congestion, hoarse voice and sore throat.    Eyes:  Negative for blurred vision and double vision.   Cardiovascular:  Negative for chest pain, dyspnea on exertion, leg swelling, orthopnea, palpitations and syncope.   Respiratory:  Positive for shortness of breath. Negative for cough and wheezing.    Gastrointestinal:  Negative for abdominal pain, hematemesis, hematochezia and melena.    Genitourinary:  Negative for dysuria and hematuria.   Neurological:  Negative for dizziness, headaches, light-headedness and numbness.   Psychiatric/Behavioral:  Negative for depression. The patient is not nervous/anxious.          Current Outpatient Medications:     albuterol (PROVENTIL HFA;VENTOLIN HFA) 108 (90 BASE) MCG/ACT inhaler, Inhale 2 puffs Every 4 (Four) Hours As Needed for Wheezing., Disp: , Rfl:     amitriptyline (ELAVIL) 10 MG tablet, Take 1 tablet by mouth Every Night., Disp: , Rfl:     aspirin 81 MG chewable tablet, Chew 1 tablet Daily., Disp: , Rfl:     atorvastatin (LIPITOR) 10 MG tablet, TAKE ONE TABLET BY MOUTH ONCE NIGHTLY, Disp: 90 tablet, Rfl: 1    azelastine (ASTELIN) 0.1 % nasal spray, 1 spray into the nostril(s) as directed by provider 2 (Two) Times a Day. Use in each nostril as directed, Disp: 30 mL, Rfl: 2    azelastine (OPTIVAR) 0.05 % ophthalmic solution, Administer 1 drop to both eyes 2 (Two) Times a Day., Disp: 6 each, Rfl: 3    cyanocobalamin 1000 MCG/ML injection, Inject 1 mL under the skin into the appropriate area as directed Every 30 (Thirty) Days., Disp: 3 mL, Rfl: 3    Diclofenac Sodium (Voltaren) 1 % gel gel, Apply 2 g topically to the appropriate area as directed 4 (Four) Times a Day As Needed (pain)., Disp: 350 g, Rfl: 0    EPINEPHrine (EPIPEN) 0.3 MG/0.3ML solution auto-injector injection, , Disp: , Rfl:     esomeprazole (nexIUM) 40 MG capsule, Take 1 capsule by mouth 2 (Two) Times a Day., Disp: 180 capsule, Rfl: 3    ferrous sulfate 325 (65 FE) MG tablet, Take 1 tablet by mouth Daily With Breakfast., Disp: 90 tablet, Rfl: 3    fexofenadine (ALLEGRA) 180 MG tablet, Take 1 tablet by mouth Daily., Disp: , Rfl:     fluticasone (FLONASE) 50 MCG/ACT nasal spray, Administer 2 sprays into the nostril(s) as directed by provider Daily. Administer 2 sprays in each nostril for each dose., Disp: , Rfl:     Fluticasone-Umeclidin-Vilant (Trelegy Ellipta) 200-62.5-25 MCG/ACT inhaler,  Inhale 1 puff Daily., Disp: , Rfl:     hydrOXYzine (ATARAX) 10 MG tablet, TAKE ONE TABLET BY MOUTH THREE TIMES A DAY AS NEEDED FOR ANXIETY, Disp: 270 tablet, Rfl: 1    Klor-Con M20 20 MEQ CR tablet, TAKE 1 TABLET BY MOUTH TWICE A DAY, Disp: 180 tablet, Rfl: 3    levalbuterol (XOPENEX) 0.63 MG/3ML nebulizer solution, Take 1 ampule by nebulization Every 4 (Four) Hours As Needed for Wheezing., Disp: , Rfl:     levothyroxine (SYNTHROID, LEVOTHROID) 50 MCG tablet, TAKE 1 TABLET BY MOUTH DAILY, Disp: 90 tablet, Rfl: 1    metaxalone (Skelaxin) 800 MG tablet, Take 0.5-1 tablets by mouth 3 (Three) Times a Day As Needed for Muscle Spasms., Disp: 60 tablet, Rfl: 0    omalizumab (XOLAIR) 150 MG injection, Inject 1.2 mL under the skin into the appropriate area as directed Every 30 (Thirty) Days., Disp: , Rfl:     Probiotic Product (FLORAJEN3) capsule, Take 1 tablet by mouth daily., Disp: , Rfl:     thiamine (B-1) 100 MG/ML injection, Inject 2 mL into the appropriate muscle as directed by prescriber Every 30 (Thirty) Days., Disp: 6 mL, Rfl: 3    Trintellix 10 MG tablet tablet, TAKE 1 TABLET BY MOUTH DAILY WITH BREAKFAST, Disp: 90 tablet, Rfl: 1    valsartan (DIOVAN) 160 MG tablet, TAKE 1 TABLET BY MOUTH DAILY, Disp: 90 tablet, Rfl: 1    Past Medical History:   Diagnosis Date    Abnormal EEG     Acute upper respiratory infection     Allergic     Allergic conjunctivitis     Allergic rhinitis     Anemia     Anesthesia complication     HARD TO AWAKEN    Anxiety     Arthritis     Asthma     Atopic dermatitis     BMI 40.0-44.9, adult     Bronchitis     Cancer     Cataract     Cervical neuritis     Cervical stenosis of spine     Cervicalgia     Cholelithiasis     Chronic obstructive asthma with acute exacerbation     Colon polyp 2022    Removed by surgeon    DDD (degenerative disc disease), lumbar     Depression     Disease of thyroid gland     Dizzinesses     Dysphagia     RESOLVED AFTER SURG    Encounter for screening colonoscopy  09/27/2022    Erosive esophagitis     Facet arthritis of cervical region     Generalized osteoarthritis of multiple sites     GERD (gastroesophageal reflux disease)     Hemangioma     History of esophageal stricture     History of transfusion     HL (hearing loss)     Hypercholesteremia     Hyperlipidemia     Hypertension     Hypokalemia     Hypothyroidism     Influenza A with respiratory manifestations     Internal hemorrhoids     Intestinal malabsorption     Lactose intolerance     Low back pain     Mixed hyperlipidemia     Multiple joint complaints     Need for prophylactic vaccination against Streptococcus pneumoniae (pneumococcus) and influenza     Neoplasm of uncertain behavior of skin     JAY on CPAP     NEW SLEEP TESTDONE SEPT, TRIAL OF NOT WEARING CPAP    Osteopenia of lumbar spine     Osteopenia of spine     Osteoporosis     Pain in arm     Panniculitis     PONV (postoperative nausea and vomiting)     Screening for malignant neoplasm of breast     Screening for malignant neoplasm of cervix     Sleep apnea     SOB (shortness of breath)     Stroke     Thiamine deficiency     TIA (transient ischemic attack)     Vitamin B deficiency     Vitamin B12 deficiency     Vitamin D deficiency     Wears glasses        Past Surgical History:   Procedure Laterality Date    ABDOMINAL SURGERY      ABDOMINOPLASTY N/A 11/01/2019    Procedure: ABDOMINOPLASTY;  Surgeon: Waterhouse, Maurine, MD;  Location: [facility] MAIN OR;  Service: Plastics    BARIATRIC SURGERY      CARDIAC CATHETERIZATION  10/2009    Abnormal blood vessels    CARPAL TUNNEL RELEASE Left     CERVICAL DISC SURGERY  04/21/2016    bones spurs removed as well    CERVICAL SPINE SURGERY  04/20/2017    CHOLECYSTECTOMY      CLOSED REDUCTION WRIST FRACTURE  09/02/2021    COLONOSCOPY      COLONOSCOPY N/A 11/22/2022    Procedure: COLONOSCOPY with polypectomy;  Surgeon: Agustín Rolon MD;  [place] [facility] [place];  Service: Gastroenterology;  Laterality: N/A;   hemorrhoids, polyp    DIAGNOSTIC LAPAROSCOPY      ELBOW PROCEDURE Left     release of nerve similar to carpal tunnel    ENDOSCOPY N/A 11/22/2022    Procedure: ESOPHAGOGASTRODUODENOSCOPY with dilation;  Surgeon: Agustín Rolon MD;  Location: [facility] [place] [facility] OR;  Service: Gastroenterology;  Laterality: N/A;  gastric bypass, hiatal hernia, schatzki's ring, dilated to 20mm    ENDOSCOPY N/A 01/27/2025    Procedure: ESOPHAGOGASTRODUODENOSCOPY with balloon dilation;  Surgeon: Agustín Rolon MD;  Location: [facility] [place] [facility] OR;  Service: Gastroenterology;  Laterality: N/A;  dialted to 20 mm, hiatal hernia, gastric bypass, schatzki's ring    EPIDURAL BLOCK      every 3 months.     EYE SURGERY Left 2000    CYST, CATARACT    FRACTURE SURGERY      GASTRIC BYPASS  10/2010        HAND SURGERY Right     HEMORRHOIDECTOMY      HYSTERECTOMY      INGUINAL HERNIA REPAIR Bilateral     JOINT REPLACEMENT      OOPHORECTOMY      PANNICULECTOMY N/A 11/01/2019    Procedure: PANNICULECTOMY;  Surgeon: Waterhouse, Maurine, MD;  Location: [facility] MAIN OR;  Service: Plastics    REPLACEMENT TOTAL KNEE BILATERAL      RIB FRACTURE SURGERY      1ST RIB REMOVED, DUE NUMBNESS IN ARM    ROTATOR CUFF REPAIR Bilateral     SPINAL FUSION      SPINE SURGERY      UPPER GASTROINTESTINAL ENDOSCOPY         Family History   Problem Relation Age of Onset    Arthritis Mother     Diabetes Mother     Heart disease Mother         Congestive heart failure    Vision loss Mother     Miscarriages / Stillbirths Mother     Hypertension Mother     Vasculitis Father         Cerebral    Coronary artery disease Father     Early death Father     Hearing loss Father     Heart disease Father     Cancer Father     Stroke Father     Heart attack Father     Cancer Sister     Diabetes Sister     Hypertension Sister     Diabetes Sister     Diabetes Brother     Diabetes Brother     Cancer Brother         Melanoma    Breast cancer Paternal Aunt     Diabetes Other     Cancer Sister   "       Melanoma    Cancer Brother     Cancer Brother     Cancer Sister         Melanoma    Cancer Brother     Patrick Hyperthermia Neg Hx     Colon cancer Neg Hx     Colon polyps Neg Hx     Crohn's disease Neg Hx     Irritable bowel syndrome Neg Hx     Ulcerative colitis Neg Hx        Social History     Tobacco Use    Smoking status: Never    Smokeless tobacco: Never   Vaping Use    Vaping status: Never Used   Substance Use Topics    Alcohol use: Not Currently     Alcohol/week: 1.0 standard drink of alcohol     Comment: Rarely    Drug use: No         ECG 12 Lead    Date/Time: 6/10/2025 3:45 PM  Performed by: Berta De Leon APRN    Authorized by: Berta De Leon APRN  Comparison: compared with previous ECG from 12/9/2024  Similar to previous ECG  Rhythm: sinus rhythm             Objective:     Visit Vitals  /74 (BP Location: Right arm, Patient Position: Sitting, Cuff Size: Adult)   Pulse 85   Ht 160 cm (63\")   Wt 87.5 kg (193 lb)   BMI 34.19 kg/m²             Physical Exam  Constitutional:       Appearance: Normal appearance. She is normal weight.   HENT:      Head: Normocephalic.   Neck:      Vascular: No carotid bruit.   Cardiovascular:      Rate and Rhythm: Normal rate and regular rhythm.      Chest Wall: PMI is not displaced.      Pulses: Normal pulses.           Radial pulses are 2+ on the right side and 2+ on the left side.        Posterior tibial pulses are 2+ on the right side and 2+ on the left side.      Heart sounds: Normal heart sounds. No murmur heard.     No friction rub. No gallop.   Pulmonary:      Effort: Pulmonary effort is normal.      Breath sounds: Normal breath sounds.   Abdominal:      General: Bowel sounds are normal. There is no distension.      Palpations: Abdomen is soft.   Musculoskeletal:      Right lower leg: No edema.      Left lower leg: No edema.   Skin:     General: Skin is warm and dry.      Capillary Refill: Capillary refill takes less than 2 seconds.   Neurological:      " Mental Status: She is alert and oriented to person, place, and time.   Psychiatric:         Mood and Affect: Mood normal.         Behavior: Behavior normal.         Thought Content: Thought content normal.          Lab Review:   Lipid Panel          10/30/2024    10:29 1/22/2025    09:05   Lipid Panel   Total Cholesterol 171  144    Triglycerides 97  76    HDL Cholesterol 75  66    VLDL Cholesterol 17  15    LDL Cholesterol  79  63          Cardiac Procedures:   Stress MPI 6/12/2024:  Myocardial perfusion imaging indicates a normal myocardial perfusion study with no evidence of ischemia. Impressions are consistent with a low risk study.  Left ventricular ejection fraction is hyperdynamic (Calculated EF > 70%).     Echocardiogram 11/13/2023:  Left ventricular systolic function is normal. Calculated left ventricular EF = 59.6% Normal left ventricular cavity size and wall thickness noted. All left ventricular wall segments contract normally. Left ventricular diastolic function was normal.  Mild mitral annular calcification is present. Trace mitral valve regurgitation is present.  Mild tricuspid valve regurgitation is present. Estimated right ventricular systolic pressure from tricuspid regurgitation is normal (<35 mmHg).        Cardiac CTA 10/20/2009 [facility]:  Anomalous left main coronary artery with origin from separate ostium of the right coronary cusp traversing and a retroaortic path and does not have an interarterial course.  No significant coronary artery disease noted throughout the coronary arteries    Assessment:         Diagnoses and all orders for this visit:    1. Hyperlipidemia, unspecified hyperlipidemia type (Primary)            Plan:       Hypertension: blood pressure has been well controlled. HCTZ recently stopped for hypotension. She is losing weight. Recommended continuing to keep an eye on BP as she loses more weight.  Anomalous left main coronary artery: she has a left main arising from the  right coronary cusp taking a posterior course and not intra-arterial. This is a benign course and does not increase risk of cardiovascular issues.    Thank you for allowing me to participate in this patient's care. Please call with any questions or concerns. Ms Pendleton will follow up with Dr. Thompson in 6 months.          Your medication list            Accurate as of Purnima 10, 2025  1:07 PM. If you have any questions, ask your nurse or doctor.                CONTINUE taking these medications        Instructions Last Dose Given Next Dose Due   albuterol sulfate  (90 Base) MCG/ACT inhaler  Commonly known as: PROVENTIL HFA;VENTOLIN HFA;PROAIR HFA      Inhale 2 puffs Every 4 (Four) Hours As Needed for Wheezing.       amitriptyline 10 MG tablet  Commonly known as: ELAVIL      Take 1 tablet by mouth Every Night.       aspirin 81 MG chewable tablet      Chew 1 tablet Daily.       atorvastatin 10 MG tablet  Commonly known as: LIPITOR      TAKE ONE TABLET BY MOUTH ONCE NIGHTLY       azelastine 0.05 % ophthalmic solution  Commonly known as: OPTIVAR      Administer 1 drop to both eyes 2 (Two) Times a Day.       azelastine 0.1 % nasal spray  Commonly known as: ASTELIN      1 spray into the nostril(s) as directed by provider 2 (Two) Times a Day. Use in each nostril as directed       cyanocobalamin 1000 MCG/ML injection      Inject 1 mL under the skin into the appropriate area as directed Every 30 (Thirty) Days.       Diclofenac Sodium 1 % gel gel  Commonly known as: Voltaren      Apply 2 g topically to the appropriate area as directed 4 (Four) Times a Day As Needed (pain).       EPINEPHrine 0.3 MG/0.3ML solution auto-injector injection  Commonly known as: EPIPEN           esomeprazole 40 MG capsule  Commonly known as: nexIUM      Take 1 capsule by mouth 2 (Two) Times a Day.       ferrous sulfate 325 (65 FE) MG tablet      Take 1 tablet by mouth Daily With Breakfast.       fexofenadine 180 MG tablet  Commonly known as:  ALLEGRA      Take 1 tablet by mouth Daily.       Florajen3 capsule      Take 1 tablet by mouth daily.       fluticasone 50 MCG/ACT nasal spray  Commonly known as: FLONASE      Administer 2 sprays into the nostril(s) as directed by provider Daily. Administer 2 sprays in each nostril for each dose.       hydrOXYzine 10 MG tablet  Commonly known as: ATARAX      TAKE ONE TABLET BY MOUTH THREE TIMES A DAY AS NEEDED FOR ANXIETY       Klor-Con M20 20 MEQ CR tablet  Generic drug: potassium chloride      TAKE 1 TABLET BY MOUTH TWICE A DAY       levalbuterol 0.63 MG/3ML nebulizer solution  Commonly known as: XOPENEX      Take 1 ampule by nebulization Every 4 (Four) Hours As Needed for Wheezing.       levothyroxine 50 MCG tablet  Commonly known as: SYNTHROID, LEVOTHROID      TAKE 1 TABLET BY MOUTH DAILY       metaxalone 800 MG tablet  Commonly known as: Skelaxin      Take 0.5-1 tablets by mouth 3 (Three) Times a Day As Needed for Muscle Spasms.       omalizumab 150 MG injection  Commonly known as: XOLAIR      Inject 1.2 mL under the skin into the appropriate area as directed Every 30 (Thirty) Days.       thiamine 100 MG/ML injection  Commonly known as: B-1      Inject 2 mL into the appropriate muscle as directed by prescriber Every 30 (Thirty) Days.       Trelegy Ellipta 200-62.5-25 MCG/ACT inhaler  Generic drug: Fluticasone-Umeclidin-Vilant      Inhale 1 puff Daily.       Trintellix 10 MG tablet tablet  Generic drug: Vortioxetine HBr      TAKE 1 TABLET BY MOUTH DAILY WITH BREAKFAST       valsartan 160 MG tablet  Commonly known as: DIOVAN      TAKE 1 TABLET BY MOUTH DAILY                  JOAO Brownlee  06/10/25  1:07 PM EDT

## 2025-06-16 DIAGNOSIS — F33.41 RECURRENT MAJOR DEPRESSIVE DISORDER, IN PARTIAL REMISSION: ICD-10-CM

## 2025-06-16 RX ORDER — VORTIOXETINE 10 MG/1
10 TABLET, FILM COATED ORAL
Qty: 90 TABLET | Refills: 1 | Status: SHIPPED | OUTPATIENT
Start: 2025-06-16

## 2025-06-16 NOTE — TELEPHONE ENCOUNTER
Rx Refill Note  Requested Prescriptions     Pending Prescriptions Disp Refills    Trintellix 10 MG tablet tablet [Pharmacy Med Name: TRINTELLIX 10 MG TABLET] 90 tablet 1     Sig: TAKE 1 TABLET BY MOUTH DAILY WITH BREAKFAST      Last office visit with prescribing clinician: 5/22/2025   Last telemedicine visit with prescribing clinician: Visit date not found   Next office visit with prescribing clinician: 11/13/2025                         Would you like a call back once the refill request has been completed: [] Yes [] No    If the office needs to give you a call back, can they leave a voicemail: [] Yes [] No    Kimberly Galindo MA  06/16/25, 08:03 EDT

## 2025-06-26 ENCOUNTER — OFFICE VISIT (OUTPATIENT)
Dept: GASTROENTEROLOGY | Facility: CLINIC | Age: 75
End: 2025-06-26
Payer: MEDICARE

## 2025-06-26 VITALS
HEART RATE: 83 BPM | WEIGHT: 196 LBS | DIASTOLIC BLOOD PRESSURE: 84 MMHG | TEMPERATURE: 97.5 F | OXYGEN SATURATION: 98 % | HEIGHT: 64 IN | SYSTOLIC BLOOD PRESSURE: 138 MMHG | BODY MASS INDEX: 33.46 KG/M2

## 2025-06-26 DIAGNOSIS — K44.9 HIATAL HERNIA: Chronic | ICD-10-CM

## 2025-06-26 DIAGNOSIS — R49.0 HOARSENESS: ICD-10-CM

## 2025-06-26 DIAGNOSIS — Z98.84 S/P GASTRIC BYPASS: Chronic | ICD-10-CM

## 2025-06-26 DIAGNOSIS — K59.00 CONSTIPATION, UNSPECIFIED CONSTIPATION TYPE: ICD-10-CM

## 2025-06-26 DIAGNOSIS — R13.10 DYSPHAGIA, UNSPECIFIED TYPE: ICD-10-CM

## 2025-06-26 DIAGNOSIS — Z86.0101 HX OF ADENOMATOUS COLONIC POLYPS: Chronic | ICD-10-CM

## 2025-06-26 DIAGNOSIS — K22.2 SCHATZKI'S RING: ICD-10-CM

## 2025-06-26 DIAGNOSIS — K21.9 GASTROESOPHAGEAL REFLUX DISEASE WITHOUT ESOPHAGITIS: Primary | Chronic | ICD-10-CM

## 2025-06-26 PROCEDURE — 1160F RVW MEDS BY RX/DR IN RCRD: CPT | Performed by: NURSE PRACTITIONER

## 2025-06-26 PROCEDURE — 1159F MED LIST DOCD IN RCRD: CPT | Performed by: NURSE PRACTITIONER

## 2025-06-26 PROCEDURE — 99214 OFFICE O/P EST MOD 30 MIN: CPT | Performed by: NURSE PRACTITIONER

## 2025-06-26 NOTE — PROGRESS NOTES
Chief Complaint   Patient presents with    Follow-up     12/2024 - GERD, dysphagia, constipation           History of Present Illness  History of Present Illness  The patient presents for follow-up. She has history of GERD, gastric bypass surgery in 2008, 2 cm hiatal hernia,  Schatzki's ring s/p dilation, adenomatous colon polyps, anemia, constipation, neck surgery, and hemorrhoids s/p surgery many years ago.      Her only complaint today is persistent hoarseness and cough x 25 years, worsened since her last visit.  She was evaluated by Dr. Hunter (voice therapist) and then Dr. oN (laryngologist), workup confirmed vocal cord paralysis and dysphonia.  She underwent thyroplasty in April 2025 which unfortunately seem to worsen her symptoms.      At last visit she complained of dysphagia, esophagram was unremarkable, EGD showed Schatzki ring which was dilated.  Swallowing improved for a while.  She now reports recurrence of trouble swallowing with liquids going down the wrong way since thyroplasty in April 2025.  GERD is managed with esomeprazole 40 mg twice daily.  Voquezna worked well but insurance would not cover it.      Constipation is managed with MiraLAX every other day and high-fiber diet.     Result Review :      Progress Notes by Kate Borrego, JOAO (12/27/2024 09:00)     FL ESOPHAGRAM DOUBLE CONTRAST (01/20/2025 10:21) Evidence of previous gastric bypass procedure, no acute findings     Upper GI Endoscopy (01/27/2025 07:28) 2 cm hiatal hernia, mild Schatzki ring s/p dilation, gastric bypass without complication  UPPER GI ENDOSCOPY (11/22/2022 08:24) GERD, gastric bypass anatomy, 2 cm hiatal hernia, Schatzki's ring s/p dilation  COLONOSCOPY (11/22/2022 08:23) internal hemorrhoids, adenomatous colon polyps.  5-year recall due 2027  Tissue Pathology Exam (11/22/2022 08:50)     Medication list reviewed        Review of Systems   HENT:  Positive for trouble swallowing and voice change.          Vital Signs:  "  /84   Pulse 83   Temp 97.5 °F (36.4 °C)   Ht 161.3 cm (63.5\")   Wt 88.9 kg (196 lb)   SpO2 98%   BMI 34.18 kg/m²     Body mass index is 34.18 kg/m².     Physical Exam  Constitutional:       Appearance: Normal appearance.   HENT:      Head: Normocephalic and atraumatic.      Nose: Nose normal.      Mouth/Throat:      Comments: Hoarse  Eyes:      Extraocular Movements: Extraocular movements intact.      Conjunctiva/sclera: Conjunctivae normal.      Pupils: Pupils are equal, round, and reactive to light.   Cardiovascular:      Rate and Rhythm: Normal rate.   Pulmonary:      Effort: Pulmonary effort is normal.   Musculoskeletal:      Cervical back: Normal range of motion.   Neurological:      General: No focal deficit present.      Mental Status: She is alert and oriented to person, place, and time.   Psychiatric:         Mood and Affect: Mood normal.         Behavior: Behavior normal.         Thought Content: Thought content normal.         Judgment: Judgment normal.             Assessment and Plan    Diagnoses and all orders for this visit:    1. Gastroesophageal reflux disease without esophagitis (Primary)    2. Schatzki's ring    3. Hiatal hernia    4. S/P gastric bypass    5. Hx of adenomatous colonic polyps    6. Constipation, unspecified constipation type    7. Dysphagia, unspecified type    8. Hoarseness           Discussion:  Patient's primary concern at this time is chronic hoarseness and cough.  She is undergoing workup and treatment with laryngology and voice therapy at [facility].  Recent EGD showed Schatzki ring which was dilated, current dysphagia is likely related to her vocal cord issue and not esophageal etiology.  GERD is managed with esomeprazole 40 mg twice daily, will continue.  Unfortunately insurance would not cover Voquezna.  Constipation managed with MiraLAX.    Return in about 1 year (around 6/26/2026).       Patient Instructions   Continue Esomeprazole 40 mg twice daily     Follow " antireflux precautions for management of GERD.  Recommend avoiding eating within 3 to 4 hours of bedtime.  Avoid foods that can trigger symptoms which may include citrus fruits, spicy foods, tomatoes and red sauces, chocolate, coffee/tea, caffeinated or carbonated beverages, alcohol.     EGD with dilation can be repeated as needed due to your history of Schatzki ring    Your next colonoscopy will be due in 2027    Continue MiraLAX every other day, and high-fiber diet         Patient or patient representative verbalized consent for the use of Ambient Listening during the visit with  JOAO Perez for chart documentation. 6/26/2025  09:34 EDT            JOAO Johnson  [facility] [address]  Office: [phone]

## 2025-06-26 NOTE — PATIENT INSTRUCTIONS
Continue Esomeprazole 40 mg twice daily     Follow antireflux precautions for management of GERD.  Recommend avoiding eating within 3 to 4 hours of bedtime.  Avoid foods that can trigger symptoms which may include citrus fruits, spicy foods, tomatoes and red sauces, chocolate, coffee/tea, caffeinated or carbonated beverages, alcohol.     EGD with dilation can be repeated as needed due to your history of Schatzki ring    Your next colonoscopy will be due in 2027    Continue MiraLAX every other day, and high-fiber diet

## 2025-07-01 ENCOUNTER — INFUSION (OUTPATIENT)
Dept: ONCOLOGY | Facility: HOSPITAL | Age: 75
End: 2025-07-01
Payer: MEDICARE

## 2025-07-01 DIAGNOSIS — J45.40 MODERATE PERSISTENT ASTHMA WITHOUT COMPLICATION: Primary | ICD-10-CM

## 2025-07-01 PROCEDURE — 96372 THER/PROPH/DIAG INJ SC/IM: CPT

## 2025-07-01 PROCEDURE — 25010000002 OMALIZUMAB 150 MG/ML SOLUTION PREFILLED SYRINGE: Performed by: ALLERGY & IMMUNOLOGY

## 2025-07-01 RX ADMIN — OMALIZUMAB 150 MG: 150 INJECTION, SOLUTION SUBCUTANEOUS at 09:42

## 2025-07-01 NOTE — NURSING NOTE
Patient tolerated xolair injections without difficulty, and declined to be monitored for 30 minutes post injections. Instructed to call her prescribing MD for any concerns prior to next appt. Discharged in a stable condition.

## 2025-07-24 ENCOUNTER — OFFICE VISIT (OUTPATIENT)
Dept: SLEEP MEDICINE | Facility: HOSPITAL | Age: 75
End: 2025-07-24
Payer: MEDICARE

## 2025-07-24 VITALS
BODY MASS INDEX: 32.78 KG/M2 | HEIGHT: 64 IN | OXYGEN SATURATION: 92 % | SYSTOLIC BLOOD PRESSURE: 128 MMHG | DIASTOLIC BLOOD PRESSURE: 79 MMHG | WEIGHT: 192 LBS | HEART RATE: 78 BPM

## 2025-07-24 DIAGNOSIS — J38.00 VOCAL CORD PARALYSIS: ICD-10-CM

## 2025-07-24 DIAGNOSIS — E66.811 CLASS 1 OBESITY: ICD-10-CM

## 2025-07-24 DIAGNOSIS — G47.33 OSA ON CPAP: Primary | ICD-10-CM

## 2025-07-24 PROBLEM — E66.812 CLASS 2 OBESITY: Status: RESOLVED | Noted: 2021-10-28 | Resolved: 2025-07-24

## 2025-07-24 PROCEDURE — G0463 HOSPITAL OUTPT CLINIC VISIT: HCPCS

## 2025-07-24 PROCEDURE — 1160F RVW MEDS BY RX/DR IN RCRD: CPT | Performed by: INTERNAL MEDICINE

## 2025-07-24 PROCEDURE — 1159F MED LIST DOCD IN RCRD: CPT | Performed by: INTERNAL MEDICINE

## 2025-07-24 PROCEDURE — 99213 OFFICE O/P EST LOW 20 MIN: CPT | Performed by: INTERNAL MEDICINE

## 2025-07-24 NOTE — PROGRESS NOTES
"  [facility]  Sleep medicine  [address]  [address]  [place] [address]  Phone   Fax       SLEEP CLINIC FOLLOW UP PROGRESS NOTE.    Octavia Pendleton  [id number]   1950  74 y.o.  female      PCP: Kehrer, Meredith Lea, MD      Date of visit: 7/24/2025    Chief Complaint   Patient presents with    Sleep Apnea    Obesity       HPI:  This is a 74 y.o. years old patient is here for the management of obstructive sleep apnea.  Sleep apnea is mild in severity with a LACY 6/hr. Patient is using positive airway pressure therapy with auto CPAP and the symptoms of sleep apnea have improved significantly on the therapy. Normally patient goes to bed at 11 PM and wakes up at 730 AM .  The patient wakes up 3 time(s) during the night and has no problem going back to sleep.  Feels refreshed after waking up.     She also has vocal cord paralysis and that she is seeing Dr No    Medications and allergies are reviewed by me and documented in the encounter.     SOCIAL (habits pertaining to sleep medicine)  History tobacco use:No   History of alcohol use: 0 per week  Caffeine use: 0     REVIEW OF SYSTEMS:   Pertaining positive symptoms are:  [place] Sleepiness Scale :Total score: 7   Nasal congestion  Heartburn      PHYSICAL EXAMINATION:  CONSTITUTIONAL:  Vitals:    07/24/25 0925   BP: 128/79   Pulse: 78   SpO2: 92%   Weight: 87.1 kg (192 lb)   Height: 161.3 cm (63.5\")    Body mass index is 33.48 kg/m².   NOSE: nasal passages are clear, No deformities noted   RESP SYSTEM: Not in any respiratory distress, no chest deformities noted,   CARDIOVASULAR: No edema noted  NEURO: Oriented x 3, gait normal,  Mood and affect appeared appropriate      Data reviewed:  The Smart card downloaded on 7/24/2025 has been reviewed independently by me for compliance and discussed the data with the patient.   Compliance 95%  More than 4 hours use 91%  Average use 8 hours and 7 minutes per " night  Residual AHI 0.5/h  Mask type: Fullface mask  Device: Micaela  DME: [employer]    Patient is going to bring the device for the download      ASSESSMENT AND PLAN:  Obstructive sleep apnea ( G 47.33).  The symptoms of sleep apnea have improved with the device and the treatment.  Patient's compliance with the device is excellent for treatment of sleep apnea.  I have independently reviewed the smart card down load and discussed with the patient the download data and encouarged the patient to continue to use the device.The residual AHI is acceptable. The device is benefiting the patient and the device is medically necessary.  Without proper control of sleep apnea and good compliance there is a increased risk for hypertension, diabetes mellitus and nonrestorative sleep with hypersomnia which can increase risk for motor vehicle accidents.  Untreated sleep apnea is also a risk factor for development of atrial fibrillation, pulmonary hypertension, insulin resistance and stroke. The patient is also instructed to get the supplies from the DME company and and change them on a regular basis.  A prescription for supplies has been sent to the DME company.  I have also discussed the good sleep hygiene habits and adequate amount of sleep needed for good health.  Obesity  1 with BMI is Body mass index is 33.48 kg/m².. I have discuss the relationship between the weight and sleep apnea. The benefit of weight loss in reducing severity of sleep apnea was discussed. Discussed diet and exercise with the patient to achieve ideal BMI.   Vocal cord paralysis.  Followed by Dr. No  Return in about 1 year (around 7/24/2026) for with smart card down load. . Patient's questions were answered.    7/24/2025  Erica Santa MD  Sleep Medicine.  Medical Director,   [facility], [facility] sleep centers.

## 2025-07-29 ENCOUNTER — INFUSION (OUTPATIENT)
Dept: ONCOLOGY | Facility: HOSPITAL | Age: 75
End: 2025-07-29
Payer: MEDICARE

## 2025-07-29 DIAGNOSIS — J45.40 MODERATE PERSISTENT ASTHMA WITHOUT COMPLICATION: Primary | ICD-10-CM

## 2025-07-29 PROCEDURE — 25010000002 OMALIZUMAB 150 MG/ML SOLUTION PREFILLED SYRINGE: Performed by: ALLERGY & IMMUNOLOGY

## 2025-07-29 PROCEDURE — 96372 THER/PROPH/DIAG INJ SC/IM: CPT

## 2025-07-29 RX ADMIN — OMALIZUMAB 150 MG: 150 INJECTION, SOLUTION SUBCUTANEOUS at 09:32

## 2025-07-29 NOTE — NURSING NOTE
Arrived for XOLAIR 150mg which was administered in right arm without incident. Discharged stable with f/u appt. verified.

## 2025-08-26 ENCOUNTER — INFUSION (OUTPATIENT)
Dept: ONCOLOGY | Facility: HOSPITAL | Age: 75
End: 2025-08-26
Payer: MEDICARE

## 2025-08-26 DIAGNOSIS — J45.40 MODERATE PERSISTENT ASTHMA WITHOUT COMPLICATION: Primary | ICD-10-CM

## 2025-08-26 PROCEDURE — 25010000002 OMALIZUMAB 150 MG/ML SOLUTION PREFILLED SYRINGE: Performed by: ALLERGY & IMMUNOLOGY

## 2025-08-26 PROCEDURE — 96372 THER/PROPH/DIAG INJ SC/IM: CPT

## 2025-08-26 RX ADMIN — OMALIZUMAB 150 MG: 150 INJECTION, SOLUTION SUBCUTANEOUS at 13:34

## 2025-08-29 ENCOUNTER — CLINICAL SUPPORT (OUTPATIENT)
Dept: FAMILY MEDICINE CLINIC | Facility: CLINIC | Age: 75
End: 2025-08-29
Payer: MEDICARE

## 2025-08-29 DIAGNOSIS — E51.9 THIAMINE DEFICIENCY: Primary | ICD-10-CM

## 2025-08-29 RX ORDER — THIAMINE HYDROCHLORIDE 100 MG/ML
100 INJECTION, SOLUTION INTRAMUSCULAR; INTRAVENOUS DAILY
Status: SHIPPED | OUTPATIENT
Start: 2025-08-29

## (undated) DEVICE — BNDG ELAS ELITE V/CLOSE 6IN 5YD LF STRL

## (undated) DEVICE — GLV SURG BIOGEL LTX PF 6 1/2

## (undated) DEVICE — GOWN ,SIRUS,NONREINFORCED 3XL: Brand: MEDLINE

## (undated) DEVICE — ADAPT CLN SCPE ENDO PORPOISE BX/50 DISP

## (undated) DEVICE — KT ORCA ORCAPOD DISP STRL

## (undated) DEVICE — THERMOGARD PLUS ABC DUAL DISPERSIVE ELECTRODE: Brand: THERMOGARD

## (undated) DEVICE — DRSNG SURESITE WNDW 4X4.5

## (undated) DEVICE — VIAL FORMLN CAP 10PCT 20ML

## (undated) DEVICE — GOWN ISOL W/THUMB UNIV BLU BX/15

## (undated) DEVICE — SOL LR 1000ML

## (undated) DEVICE — SUT PROLN 0 CT1 30IN 8424H

## (undated) DEVICE — LASSO POLYPECTOMY SNARE: Brand: LASSO

## (undated) DEVICE — ESOPHAGEAL BALLOON DILATATION CATHETER: Brand: CRE FIXED WIRE

## (undated) DEVICE — 3M™ STERI-STRIP™ COMPOUND BENZOIN TINCTURE 40 BAGS/CARTON 4 CARTONS/CASE C1544: Brand: 3M™ STERI-STRIP™

## (undated) DEVICE — SYR CONTRL LUERLOK 10CC

## (undated) DEVICE — DRSNG SURESITE123 4X10IN

## (undated) DEVICE — SKIN PREP TRAY W/CHG: Brand: MEDLINE INDUSTRIES, INC.

## (undated) DEVICE — MARKR SKIN W/RULR AND LBL

## (undated) DEVICE — CONTAINER,SPECIMEN,OR STERILE,4OZ: Brand: MEDLINE

## (undated) DEVICE — DEV INFL ALLIANCE2 SYS

## (undated) DEVICE — INTENDED FOR TISSUE SEPARATION, AND OTHER PROCEDURES THAT REQUIRE A SHARP SURGICAL BLADE TO PUNCTURE OR CUT.: Brand: BARD-PARKER ® CARBON RIB-BACK BLADES

## (undated) DEVICE — DRN WND JP RND W TROC SIL 15F 3/16IN

## (undated) DEVICE — Device

## (undated) DEVICE — SMOKE EVACUATION TUBING WITH 8 IN INTEGRAL WAND AND SPONGE GUARD: Brand: BUFFALO FILTER

## (undated) DEVICE — SPNG GZ WOVN 4X4IN 12PLY 10/BX STRL

## (undated) DEVICE — NDL SPINE 22G 31/2IN BLK

## (undated) DEVICE — MEDI-VAC YANK SUCT HNDL W/TPRD BULBOUS TIP: Brand: CARDINAL HEALTH

## (undated) DEVICE — STRIP CLS WND SUTURESTRIP/PLS 0.5X5IN TP1105

## (undated) DEVICE — BNDR ABD 4PANEL 12IN 46 TO 62IN

## (undated) DEVICE — 3M™ STERI-DRAPE™ INSTRUMENT POUCH 1018: Brand: STERI-DRAPE™

## (undated) DEVICE — TOTAL TRAY, 16FR 10ML SIL FOLEY, URN: Brand: MEDLINE

## (undated) DEVICE — SYR LUERLOK 50ML

## (undated) DEVICE — BITEBLOCK OMNI BLOC

## (undated) DEVICE — SINGLE-USE BIOPSY FORCEPS: Brand: RADIAL JAW 4

## (undated) DEVICE — JACKSON-PRATT 100CC BULB RESERVOIR: Brand: CARDINAL HEALTH

## (undated) DEVICE — MEDICINE CUP, GRADUATED, STER: Brand: MEDLINE

## (undated) DEVICE — PAD GRND REM POLYHESIVE A/ DISP

## (undated) DEVICE — ANTIBACTERIAL UNDYED BRAIDED (POLYGLACTIN 910), SYNTHETIC ABSORBABLE SUTURE: Brand: COATED VICRYL

## (undated) DEVICE — PROXIMATE RH ROTATING HEAD SKIN STAPLERS (35 WIDE) CONTAINS 35 STAINLESS STEEL STAPLES: Brand: PROXIMATE

## (undated) DEVICE — ABC HANDPIECE SINGLE FUNCTION HANDPIECE: Brand: ABC

## (undated) DEVICE — SUT VIC 5/0 P3 18IN J493G

## (undated) DEVICE — FLEX ADVANTAGE 1500CC: Brand: FLEX ADVANTAGE

## (undated) DEVICE — SUT VIC 3/0 PS2 27IN J427H

## (undated) DEVICE — BLCK/BITE BLOX W/DENTL/RIM W/STRAP 54F

## (undated) DEVICE — SUT VIC 3/0 X1 27IN J458H

## (undated) DEVICE — SUT MNCRYL 5/0 P3 18 IN Y493G

## (undated) DEVICE — TUBING, SUCTION, 1/4" X 20', STRAIGHT: Brand: MEDLINE INDUSTRIES, INC.

## (undated) DEVICE — MSK ENDO PORT O2 POM ELITE CURAPLEX A/